# Patient Record
Sex: FEMALE | Race: WHITE | Employment: STUDENT | URBAN - METROPOLITAN AREA
[De-identification: names, ages, dates, MRNs, and addresses within clinical notes are randomized per-mention and may not be internally consistent; named-entity substitution may affect disease eponyms.]

---

## 2017-01-20 ENCOUNTER — HOSPITAL ENCOUNTER (EMERGENCY)
Facility: HOSPITAL | Age: 22
Discharge: HOME | End: 2017-01-20
Attending: EMERGENCY MEDICINE
Payer: COMMERCIAL

## 2017-01-20 VITALS
RESPIRATION RATE: 16 BRPM | HEART RATE: 108 BPM | TEMPERATURE: 98 F | OXYGEN SATURATION: 98 % | SYSTOLIC BLOOD PRESSURE: 120 MMHG | WEIGHT: 198.63 LBS | DIASTOLIC BLOOD PRESSURE: 76 MMHG

## 2017-01-20 DIAGNOSIS — R06.2 WHEEZING: Primary | ICD-10-CM

## 2017-01-20 PROCEDURE — 99284 EMERGENCY DEPT VISIT MOD MDM: CPT

## 2017-01-20 PROCEDURE — 94640 AIRWAY INHALATION TREATMENT: CPT

## 2017-01-20 RX ORDER — ALBUTEROL SULFATE 2.5 MG/3ML
SOLUTION RESPIRATORY (INHALATION) EVERY 6 HOURS PRN
COMMUNITY

## 2017-01-20 RX ORDER — EPINEPHRINE 0.3 MG/.3ML
0.3 INJECTION SUBCUTANEOUS
Qty: 1 EACH | Refills: 0 | Status: SHIPPED | OUTPATIENT
Start: 2017-01-20 | End: 2017-02-19

## 2017-01-20 RX ORDER — ALBUTEROL SULFATE 90 UG/1
2 AEROSOL, METERED RESPIRATORY (INHALATION) EVERY 4 HOURS PRN
Qty: 1 INHALER | Refills: 0 | Status: SHIPPED | OUTPATIENT
Start: 2017-01-20 | End: 2017-02-19

## 2017-01-20 RX ORDER — DIPHENHYDRAMINE HCL 25 MG
25 CAPSULE ORAL ONCE
Status: COMPLETED | OUTPATIENT
Start: 2017-01-20 | End: 2017-01-20

## 2017-01-20 RX ORDER — IPRATROPIUM BROMIDE AND ALBUTEROL SULFATE 2.5; .5 MG/3ML; MG/3ML
3 SOLUTION RESPIRATORY (INHALATION)
Status: COMPLETED | OUTPATIENT
Start: 2017-01-20 | End: 2017-01-20

## 2017-01-20 RX ORDER — PREDNISONE 20 MG/1
40 TABLET ORAL DAILY
Qty: 10 TABLET | Refills: 0 | Status: SHIPPED | OUTPATIENT
Start: 2017-01-20 | End: 2017-01-25

## 2017-01-20 RX ORDER — PREDNISONE 20 MG/1
60 TABLET ORAL ONCE
Status: COMPLETED | OUTPATIENT
Start: 2017-01-20 | End: 2017-01-20

## 2017-01-20 NOTE — RESPIRATORY THERAPY NOTE
BS CLEAR. SLIGHTLY DIMINISHED BUT PT WILL NOT PARTICIPATE IN TAKING DEEP BREATHS.  DID X3 DUONEB TREATMENTS WITH NO CHANGE IN RESPIRATORY STATUS (NO DISTRESS) OR BS.

## 2017-01-20 NOTE — ED INITIAL ASSESSMENT (HPI)
Hives noted to skin that have resolved prior to arrival / pt reports chest tightness and difficulty in breathing

## 2017-01-20 NOTE — ED PROVIDER NOTES
Patient Seen in: [facility] Emergency Department    History   Patient presents with:  Dyspnea JOEY SOB (respiratory)    Stated Complaint: difficulty in breathing    HPI    The patient is a 30-year-old female with a history of allergies to [facility] Pulse 114  Temp(Src) 98.4 °F (36.9 °C) (Temporal)  Resp 18  Wt 90.1 kg  SpO2 99%        Physical Exam   Constitutional: She is oriented to person, place, and time. She appears well-developed. No obvious hives noted   HENT:   Head: Normocephalic.    Eyes: needed.   Qty: 1 each Refills: 0

## 2017-07-09 ENCOUNTER — HOSPITAL ENCOUNTER (EMERGENCY)
Facility: HOSPITAL | Age: 22
Discharge: HOME | End: 2017-07-09
Attending: EMERGENCY MEDICINE
Payer: COMMERCIAL

## 2017-07-09 ENCOUNTER — APPOINTMENT (OUTPATIENT)
Dept: CT IMAGING | Facility: HOSPITAL | Age: 22
End: 2017-07-09
Attending: EMERGENCY MEDICINE
Payer: COMMERCIAL

## 2017-07-09 ENCOUNTER — APPOINTMENT (OUTPATIENT)
Dept: MRI IMAGING | Facility: HOSPITAL | Age: 22
End: 2017-07-09
Attending: EMERGENCY MEDICINE
Payer: COMMERCIAL

## 2017-07-09 VITALS
TEMPERATURE: 99 F | BODY MASS INDEX: 36.86 KG/M2 | HEIGHT: 63 IN | WEIGHT: 208 LBS | RESPIRATION RATE: 16 BRPM | SYSTOLIC BLOOD PRESSURE: 100 MMHG | OXYGEN SATURATION: 98 % | DIASTOLIC BLOOD PRESSURE: 57 MMHG | HEART RATE: 73 BPM

## 2017-07-09 DIAGNOSIS — H53.9 VISUAL DISTURBANCE: Primary | ICD-10-CM

## 2017-07-09 LAB
ALBUMIN SERPL-MCNC: 3.5 G/DL (ref 3.5–4.8)
ALP LIVER SERPL-CCNC: 70 U/L (ref 52–144)
ALT SERPL-CCNC: 37 U/L (ref 14–54)
AST SERPL-CCNC: 30 U/L (ref 15–41)
BASOPHILS # BLD AUTO: 0.08 X10(3) UL (ref 0–0.1)
BASOPHILS NFR BLD AUTO: 0.6 %
BILIRUB SERPL-MCNC: 0.5 MG/DL (ref 0.1–2)
BILIRUB UR QL STRIP.AUTO: NEGATIVE
BUN BLD-MCNC: 10 MG/DL (ref 8–20)
CALCIUM BLD-MCNC: 9.1 MG/DL (ref 8.3–10.3)
CHLORIDE: 108 MMOL/L (ref 101–111)
CLARITY UR REFRACT.AUTO: CLEAR
CO2: 24 MMOL/L (ref 22–32)
CREAT BLD-MCNC: 0.91 MG/DL (ref 0.55–1.02)
EOSINOPHIL # BLD AUTO: 0.03 X10(3) UL (ref 0–0.3)
EOSINOPHIL NFR BLD AUTO: 0.2 %
ERYTHROCYTE [DISTWIDTH] IN BLOOD BY AUTOMATED COUNT: 13.7 % (ref 11.5–16)
GLUCOSE BLD-MCNC: 102 MG/DL (ref 70–99)
GLUCOSE UR STRIP.AUTO-MCNC: NEGATIVE MG/DL
HCT VFR BLD AUTO: 40.5 % (ref 34–50)
HGB BLD-MCNC: 13.4 G/DL (ref 12–16)
IMMATURE GRANULOCYTE COUNT: 0.04 X10(3) UL (ref 0–1)
IMMATURE GRANULOCYTE RATIO %: 0.3 %
KETONES UR STRIP.AUTO-MCNC: NEGATIVE MG/DL
LYMPHOCYTES # BLD AUTO: 3.47 X10(3) UL (ref 0.9–4)
LYMPHOCYTES NFR BLD AUTO: 26.2 %
M PROTEIN MFR SERPL ELPH: 7.3 G/DL (ref 6.1–8.3)
MCH RBC QN AUTO: 26.6 PG (ref 27–33.2)
MCHC RBC AUTO-ENTMCNC: 33.1 G/DL (ref 31–37)
MCV RBC AUTO: 80.4 FL (ref 81–100)
MONOCYTES # BLD AUTO: 0.55 X10(3) UL (ref 0.1–0.6)
MONOCYTES NFR BLD AUTO: 4.2 %
NEUTROPHIL ABS PRELIM: 9.06 X10 (3) UL (ref 1.3–6.7)
NEUTROPHILS # BLD AUTO: 9.06 X10(3) UL (ref 1.3–6.7)
NEUTROPHILS NFR BLD AUTO: 68.5 %
NITRITE UR QL STRIP.AUTO: NEGATIVE
PH UR STRIP.AUTO: 7 [PH] (ref 4.5–8)
PLATELET # BLD AUTO: 333 10(3)UL (ref 150–450)
POCT LOT NUMBER: NORMAL
POCT URINE PREGNANCY: NEGATIVE
POTASSIUM SERPL-SCNC: 4.5 MMOL/L (ref 3.6–5.1)
PROT UR STRIP.AUTO-MCNC: NEGATIVE MG/DL
RBC # BLD AUTO: 5.04 X10(6)UL (ref 3.8–5.1)
RBC UR QL AUTO: NEGATIVE
RED CELL DISTRIBUTION WIDTH-SD: 39.6 FL (ref 35.1–46.3)
SODIUM SERPL-SCNC: 140 MMOL/L (ref 136–144)
SP GR UR STRIP.AUTO: <1.005 (ref 1–1.03)
UROBILINOGEN UR STRIP.AUTO-MCNC: <2 MG/DL
WBC # BLD AUTO: 13.2 X10(3) UL (ref 4–13)

## 2017-07-09 PROCEDURE — 70553 MRI BRAIN STEM W/O & W/DYE: CPT | Performed by: EMERGENCY MEDICINE

## 2017-07-09 PROCEDURE — A9575 INJ GADOTERATE MEGLUMI 0.1ML: HCPCS | Performed by: EMERGENCY MEDICINE

## 2017-07-09 PROCEDURE — 85025 COMPLETE CBC W/AUTO DIFF WBC: CPT | Performed by: EMERGENCY MEDICINE

## 2017-07-09 PROCEDURE — 99284 EMERGENCY DEPT VISIT MOD MDM: CPT

## 2017-07-09 PROCEDURE — 87086 URINE CULTURE/COLONY COUNT: CPT | Performed by: EMERGENCY MEDICINE

## 2017-07-09 PROCEDURE — 81001 URINALYSIS AUTO W/SCOPE: CPT | Performed by: EMERGENCY MEDICINE

## 2017-07-09 PROCEDURE — 36415 COLL VENOUS BLD VENIPUNCTURE: CPT

## 2017-07-09 PROCEDURE — 99285 EMERGENCY DEPT VISIT HI MDM: CPT

## 2017-07-09 PROCEDURE — 81025 URINE PREGNANCY TEST: CPT

## 2017-07-09 PROCEDURE — 87147 CULTURE TYPE IMMUNOLOGIC: CPT | Performed by: EMERGENCY MEDICINE

## 2017-07-09 PROCEDURE — 80053 COMPREHEN METABOLIC PANEL: CPT | Performed by: EMERGENCY MEDICINE

## 2017-07-09 PROCEDURE — 70450 CT HEAD/BRAIN W/O DYE: CPT | Performed by: EMERGENCY MEDICINE

## 2017-07-10 NOTE — ED NOTES
Awakens easily; interm ha when exposed to light outside her room. Denies nausea. Abarca without difficulty.

## 2017-07-10 NOTE — ED PROVIDER NOTES
Patient Seen in: [facility] Emergency Department    History   Patient presents with:   Allergic Rxn Allergies (immune)    Stated Complaint: post procedure complications pt gave plasma now has multiple symptoms    HPI    19-year-old female presents em 21 [07/09/17 1943]  Temp: 99.4 °F (37.4 °C) [07/09/17 1943]  Temp src: Temporal [07/09/17 1943]  SpO2: 99 % [07/09/17 1943]  O2 Device: None (Room air) [07/09/17 2107]    Current:/57   Pulse 73   Temp 99.4 °F (37.4 °C) (Temporal)   Resp 16   Ht 160 c W/ DIFFERENTIAL - Abnormal; Notable for the following:     WBC 13.2 (*)     MCV 80.4 (*)     MCH 26.6 (*)     Neutrophil Absolute Prelim 9.06 (*)     Neutrophil Absolute 9.06 (*)     All other components within normal limits   CBC WITH DIFFERENTIAL WITH PL oval foci of decreased CT attenuation within the anterior right and left nura. These are also seen on the sagittal and coronal sequences. The differential diagnosis would include artifact, areas of remote ischemia with acute ischemia not entirely excluded. acute infarction. There is no evidence of hemorrhage or mass lesion. There is normal enhancement of the brain, vessels and meninges. The expected intracranial flow voids are noted involving the carotid and left vertebral artery.  The right vertebral artery

## 2017-07-10 NOTE — ED NOTES
Pt reports she has donated plasma 3 times in past two weeks - at same location. [facility] @ [address], [place]. Reports she never had this reaction before.

## 2017-07-10 NOTE — ED INITIAL ASSESSMENT (HPI)
Pt to er after giving plasma at 1600 today. States she still feels nausea,blurred vision/interm,weakness and yakelin. Speaking in clear full sentences.

## 2017-07-10 NOTE — ED NOTES
Pt updated on poc - remains resting comfortably and reports she has almost \"full use of her right arm\".

## 2017-07-10 NOTE — ED NOTES
Pt reports she will take out all 18 piercings- cup received and given to adult friend at . Pt reports the plasma center had multi iv issues at both L and R ac iv access.  States plasma ctr had blood taken out with their machine without difficulty, but was

## 2017-07-11 RX ORDER — AMOXICILLIN 875 MG/1
875 TABLET, COATED ORAL 2 TIMES DAILY
Qty: 20 TABLET | Refills: 0 | Status: SHIPPED | OUTPATIENT
Start: 2017-07-11 | End: 2017-07-21

## 2017-11-16 ENCOUNTER — CHARTING TRANS (OUTPATIENT)
Dept: INTERNAL MEDICINE | Age: 22
End: 2017-11-16

## 2017-11-16 ENCOUNTER — LAB SERVICES (OUTPATIENT)
Dept: OTHER | Age: 22
End: 2017-11-16

## 2017-11-16 LAB
ALBUMIN SERPL BCG-MCNC: 4.5 G/DL (ref 3.6–5.1)
ALP SERPL-CCNC: 84 U/L (ref 45–105)
ALT SERPL W/O P-5'-P-CCNC: 23 U/L (ref 7–34)
AST SERPL-CCNC: 19 U/L (ref 9–37)
BILIRUB SERPL-MCNC: 0.2 MG/DL (ref 0–1)
BUN SERPL-MCNC: 8 MG/DL (ref 7–20)
CALCIUM SERPL-MCNC: 10.2 MG/DL (ref 8.6–10.6)
CHLORIDE SERPL-SCNC: 107 MMOL/L (ref 96–107)
CREATININE, SERUM: 0.9 MG/DL (ref 0.5–1.4)
DIFFERENTIAL TYPE: ABNORMAL
GFR SERPL CREATININE-BSD FRML MDRD: >60 ML/MIN/{1.73M2}
GFR SERPL CREATININE-BSD FRML MDRD: >60 ML/MIN/{1.73M2}
GLUCOSE SERPL-MCNC: 108 MG/DL (ref 70–200)
HCO3 SERPL-SCNC: 24 MMOL/L (ref 22–32)
HEMATOCRIT: 40.2 % (ref 34–45)
HEMOGLOBIN: 13.3 G/DL (ref 11.2–15.7)
LYMPH PERCENT: 39.4 % (ref 20.5–51.1)
LYMPHOCYTE ABSOLUTE #: 3.4 10*3/UL (ref 1.2–3.4)
MEAN CORPUSCULAR HGB CONCENTRATION: 33.1 % (ref 32–36)
MEAN CORPUSCULAR HGB: 26 PG (ref 27–34)
MEAN CORPUSCULAR VOLUME: 78.7 FL (ref 79–95)
MEAN PLATELET VOLUME: 9.1 FL (ref 8.6–12.4)
MIXED %: 7.8 % (ref 4.3–12.9)
MIXED ABSOLUTE #: 0.7 10*3/UL (ref 0.2–0.9)
NEUTROPHIL ABSOLUTE #: 4.6 10*3/UL (ref 1.4–6.5)
NEUTROPHIL PERCENT: 52.8 % (ref 34–73.5)
PLATELET COUNT: 399 10*3/UL (ref 150–400)
POTASSIUM SERPL-SCNC: 4.2 MMOL/L (ref 3.5–5.3)
PROT SERPL-MCNC: 7.3 G/DL (ref 6.2–8.1)
RED BLOOD CELL COUNT: 5.11 10*6/UL (ref 3.7–5.2)
RED CELL DISTRIBUTION WIDTH: 15.7 % (ref 11.3–14.8)
SEDIMENTATION RATE, RBC: 22 MM/H (ref 0–20)
SODIUM SERPL-SCNC: 140 MMOL/L (ref 136–146)
TSH SERPL DL<=0.05 MIU/L-ACNC: 0.73 M[IU]/L (ref 0.3–4.82)
WHITE BLOOD CELL COUNT: 8.7 10*3/UL (ref 4–10)

## 2018-11-27 VITALS
DIASTOLIC BLOOD PRESSURE: 68 MMHG | WEIGHT: 214 LBS | TEMPERATURE: 97.2 F | HEART RATE: 88 BPM | SYSTOLIC BLOOD PRESSURE: 118 MMHG | BODY MASS INDEX: 39.38 KG/M2 | HEIGHT: 62 IN

## 2019-08-16 ENCOUNTER — COMMUNICATION - HEALTHEAST (OUTPATIENT)
Dept: BEHAVIORAL HEALTH | Facility: CLINIC | Age: 24
End: 2019-08-16

## 2019-08-22 ENCOUNTER — OFFICE VISIT - HEALTHEAST (OUTPATIENT)
Dept: INTERNAL MEDICINE | Facility: CLINIC | Age: 24
End: 2019-08-22

## 2019-08-22 DIAGNOSIS — J45.41 MODERATE PERSISTENT ASTHMA WITH EXACERBATION: ICD-10-CM

## 2019-08-22 DIAGNOSIS — J38.3 VOCAL CORD DYSFUNCTION: ICD-10-CM

## 2019-08-22 DIAGNOSIS — F33.41 RECURRENT MAJOR DEPRESSIVE DISORDER, IN PARTIAL REMISSION (H): ICD-10-CM

## 2019-08-22 DIAGNOSIS — Z00.00 HEALTH MAINTENANCE EXAMINATION: ICD-10-CM

## 2019-08-22 DIAGNOSIS — J30.9 ALLERGIC RHINITIS, UNSPECIFIED SEASONALITY, UNSPECIFIED TRIGGER: ICD-10-CM

## 2019-08-22 ASSESSMENT — MIFFLIN-ST. JEOR: SCORE: 1719.77

## 2019-09-09 ENCOUNTER — HOSPITAL ENCOUNTER (EMERGENCY)
Facility: CLINIC | Age: 24
Discharge: HOME | End: 2019-09-09
Attending: FAMILY MEDICINE | Admitting: FAMILY MEDICINE
Payer: COMMERCIAL

## 2019-09-09 VITALS
DIASTOLIC BLOOD PRESSURE: 69 MMHG | SYSTOLIC BLOOD PRESSURE: 125 MMHG | HEART RATE: 97 BPM | TEMPERATURE: 98.4 F | OXYGEN SATURATION: 97 %

## 2019-09-09 DIAGNOSIS — F41.0 PANIC ATTACK: ICD-10-CM

## 2019-09-09 PROCEDURE — 25000128 H RX IP 250 OP 636: Performed by: STUDENT IN AN ORGANIZED HEALTH CARE EDUCATION/TRAINING PROGRAM

## 2019-09-09 PROCEDURE — 99284 EMERGENCY DEPT VISIT MOD MDM: CPT | Mod: Z6 | Performed by: FAMILY MEDICINE

## 2019-09-09 PROCEDURE — 96374 THER/PROPH/DIAG INJ IV PUSH: CPT | Performed by: FAMILY MEDICINE

## 2019-09-09 PROCEDURE — 99285 EMERGENCY DEPT VISIT HI MDM: CPT | Performed by: FAMILY MEDICINE

## 2019-09-09 RX ORDER — TRAZODONE HYDROCHLORIDE 50 MG/1
50 TABLET, FILM COATED ORAL
COMMUNITY
End: 2022-11-29

## 2019-09-09 RX ORDER — PRAZOSIN HYDROCHLORIDE 1 MG/1
3 CAPSULE ORAL AT BEDTIME
COMMUNITY
End: 2022-11-29

## 2019-09-09 RX ORDER — SERTRALINE HYDROCHLORIDE 100 MG/1
150 TABLET, FILM COATED ORAL DAILY
COMMUNITY
End: 2021-10-29

## 2019-09-09 RX ORDER — BUDESONIDE AND FORMOTEROL FUMARATE DIHYDRATE 80; 4.5 UG/1; UG/1
2 AEROSOL RESPIRATORY (INHALATION) 2 TIMES DAILY
COMMUNITY
End: 2021-09-14

## 2019-09-09 RX ORDER — LANOLIN ALCOHOL/MO/W.PET/CERES
3 CREAM (GRAM) TOPICAL
COMMUNITY
End: 2021-09-14

## 2019-09-09 RX ORDER — PRAZOSIN HYDROCHLORIDE 2 MG/1
2 CAPSULE ORAL AT BEDTIME
COMMUNITY
End: 2021-09-14

## 2019-09-09 RX ORDER — OMEGA-3 FATTY ACIDS/FISH OIL 300-1000MG
400 CAPSULE ORAL EVERY 4 HOURS PRN
COMMUNITY

## 2019-09-09 RX ORDER — ALBUTEROL SULFATE 90 UG/1
2 AEROSOL, METERED RESPIRATORY (INHALATION) EVERY 6 HOURS PRN
COMMUNITY
End: 2021-09-14

## 2019-09-09 RX ORDER — HYDROXYZINE HYDROCHLORIDE 50 MG/1
50 TABLET, FILM COATED ORAL EVERY 4 HOURS PRN
COMMUNITY
End: 2023-01-30

## 2019-09-09 RX ORDER — LORAZEPAM 2 MG/ML
0.5 INJECTION INTRAMUSCULAR ONCE
Status: DISCONTINUED | OUTPATIENT
Start: 2019-09-09 | End: 2019-09-09

## 2019-09-09 RX ORDER — FLUTICASONE PROPIONATE 50 MCG
1 SPRAY, SUSPENSION (ML) NASAL DAILY
COMMUNITY
End: 2022-12-30

## 2019-09-09 RX ORDER — LORAZEPAM 0.5 MG/1
0.5 TABLET ORAL ONCE
Status: DISCONTINUED | OUTPATIENT
Start: 2019-09-09 | End: 2019-09-09

## 2019-09-09 RX ORDER — AMOXICILLIN 250 MG
1 CAPSULE ORAL DAILY
COMMUNITY
End: 2021-09-14

## 2019-09-09 RX ORDER — CETIRIZINE HYDROCHLORIDE 10 MG/1
10 TABLET ORAL DAILY PRN
COMMUNITY

## 2019-09-09 RX ORDER — LORAZEPAM 2 MG/ML
0.5 INJECTION INTRAMUSCULAR ONCE
Status: COMPLETED | OUTPATIENT
Start: 2019-09-09 | End: 2019-09-09

## 2019-09-09 RX ORDER — ALBUTEROL SULFATE 0.83 MG/ML
2.5 SOLUTION RESPIRATORY (INHALATION) EVERY 6 HOURS PRN
COMMUNITY
End: 2021-09-14

## 2019-09-09 RX ORDER — POLYETHYLENE GLYCOL 3350 17 G/17G
1 POWDER, FOR SOLUTION ORAL DAILY
COMMUNITY
End: 2021-09-14

## 2019-09-09 RX ADMIN — LORAZEPAM 0.5 MG: 2 INJECTION INTRAMUSCULAR; INTRAVENOUS at 18:03

## 2019-09-09 ASSESSMENT — ENCOUNTER SYMPTOMS
ABDOMINAL PAIN: 0
HEADACHES: 0
LIGHT-HEADEDNESS: 0
FREQUENCY: 0
COUGH: 0
WHEEZING: 0
SHORTNESS OF BREATH: 0
NECK PAIN: 0
CHILLS: 0
AGITATION: 0
BACK PAIN: 0
COLOR CHANGE: 0
EYE PAIN: 0
FEVER: 0
ABDOMINAL DISTENTION: 0
DYSURIA: 0
NERVOUS/ANXIOUS: 1
FATIGUE: 0
CHEST TIGHTNESS: 0
CONFUSION: 0
PHOTOPHOBIA: 0
PALPITATIONS: 0

## 2019-09-09 NOTE — ED AVS SNAPSHOT
[facility], [place], Emergency Department  [address] [place] [id number]  Phone:  [phone]                                    Fabian Springer   MRN: [id number]    Department:  [facility], Emergency Department   Date of Visit:  9/9/2019           After Visit Summary Signature Page    I have received my discharge instructions, and my questions have been answered. I have discussed any challenges I see with this plan with the nurse or doctor.    ..........................................................................................................................................  Patient/Patient Representative Signature      ..........................................................................................................................................  Patient Representative Print Name and Relationship to Patient    ..................................................               ................................................  Date                                   Time    ..........................................................................................................................................  Reviewed by Signature/Title    ...................................................              ..............................................  Date                                               Time          22EPIC Rev 08/18

## 2019-09-09 NOTE — ED NOTES
Bed: HWD  Expected date: 9/9/19  Expected time:   Means of arrival: Ambulance  Comments:  STGARY 20  24 y F  MELY  Yellow

## 2019-09-10 NOTE — DISCHARGE INSTRUCTIONS
Please continue with your therapy sessions at [facility]. If you have any thoughts of self harm, please come to the ED immediately. If your shortness of breath worsens or you develop worsening chest pain, please come to the ED.
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
## 2019-09-13 ENCOUNTER — RECORDS - HEALTHEAST (OUTPATIENT)
Dept: GENERAL RADIOLOGY | Facility: CLINIC | Age: 24
End: 2019-09-13

## 2019-09-13 ENCOUNTER — OFFICE VISIT - HEALTHEAST (OUTPATIENT)
Dept: INTERNAL MEDICINE | Facility: CLINIC | Age: 24
End: 2019-09-13

## 2019-09-13 DIAGNOSIS — M25.532 PAIN IN LEFT WRIST: ICD-10-CM

## 2019-09-13 DIAGNOSIS — I80.9 THROMBOPHLEBITIS: ICD-10-CM

## 2019-09-13 DIAGNOSIS — M25.532 LEFT WRIST PAIN: ICD-10-CM

## 2019-09-13 ASSESSMENT — MIFFLIN-ST. JEOR: SCORE: 1701.63

## 2019-11-21 ENCOUNTER — RECORDS - HEALTHEAST (OUTPATIENT)
Dept: ADMINISTRATIVE | Facility: OTHER | Age: 24
End: 2019-11-21

## 2019-11-25 ENCOUNTER — OFFICE VISIT - HEALTHEAST (OUTPATIENT)
Dept: INTERNAL MEDICINE | Facility: CLINIC | Age: 24
End: 2019-11-25

## 2019-11-25 DIAGNOSIS — F33.2 MAJOR DEPRESSIVE DISORDER, RECURRENT, SEVERE WITHOUT PSYCHOTIC BEHAVIOR (H): ICD-10-CM

## 2019-11-25 LAB — HCG UR QL: NEGATIVE

## 2019-11-25 ASSESSMENT — MIFFLIN-ST. JEOR: SCORE: 1683.48

## 2019-11-26 ENCOUNTER — COMMUNICATION - HEALTHEAST (OUTPATIENT)
Dept: INTERNAL MEDICINE | Facility: CLINIC | Age: 24
End: 2019-11-26

## 2019-12-10 ENCOUNTER — RECORDS - HEALTHEAST (OUTPATIENT)
Dept: ADMINISTRATIVE | Facility: OTHER | Age: 24
End: 2019-12-10

## 2020-04-04 ENCOUNTER — RECORDS - HEALTHEAST (OUTPATIENT)
Dept: ADMINISTRATIVE | Facility: OTHER | Age: 25
End: 2020-04-04

## 2020-05-14 ENCOUNTER — OFFICE VISIT - HEALTHEAST (OUTPATIENT)
Dept: FAMILY MEDICINE | Facility: CLINIC | Age: 25
End: 2020-05-14

## 2020-05-14 ENCOUNTER — COMMUNICATION - HEALTHEAST (OUTPATIENT)
Dept: SCHEDULING | Facility: CLINIC | Age: 25
End: 2020-05-14

## 2020-05-14 ENCOUNTER — VIRTUAL VISIT (OUTPATIENT)
Dept: FAMILY MEDICINE | Facility: OTHER | Age: 25
End: 2020-05-14

## 2020-05-14 ENCOUNTER — AMBULATORY - HEALTHEAST (OUTPATIENT)
Dept: FAMILY MEDICINE | Facility: CLINIC | Age: 25
End: 2020-05-14

## 2020-05-14 DIAGNOSIS — Z20.822 SUSPECTED 2019 NOVEL CORONAVIRUS INFECTION: ICD-10-CM

## 2020-05-14 NOTE — PROGRESS NOTES
"Date: 2020 12:38:09  Clinician: Dwain Eldridge  Clinician NPI: [id number]  Patient: Fabian Springer  Patient : 1995  [place] [address] [place] [address]  Patient Phone: [phone]  Visit Protocol: URI  Patient Summary:  Fabian is a 24 year old ( : 1995 ) female who initiated a Visit for COVID-19 (Coronavirus) evaluation and screening. When asked the question \"Please sign me up to receive news, health information and promotions from [employer].\", Fabian responded \"No\".    Fabian states her symptoms started 1-2 days ago.   Her symptoms consist of a cough, nausea, chills, ear pain, a headache, malaise, a sore throat, and enlarged lymph nodes. She is experiencing mild difficulty breathing with activities but can speak normally in full sentences.   Symptom details     Cough: Fabian coughs every 5-10 minutes and her cough is more bothersome at night. Phlegm does not come into her throat when she coughs. She does not believe her cough is caused by post-nasal drip.     Sore throat: Fabian reports having moderate throat pain (4-6 on a 10 point pain scale), has exudate on her tonsils, and can swallow liquids. The lymph nodes in her neck are enlarged. A rash has not appeared on the skin since the sore throat started.     Headache: She states the headache is moderate (4-6 on a 10 point pain scale).      Fabian denies having nasal congestion, vomiting, teeth pain, ageusia, diarrhea, facial pain or pressure, myalgias, rhinitis, anosmia, wheezing, and fever. She also denies taking antibiotic medication for the symptoms and having recent facial or sinus surgery in the past 60 days.   Precipitating events  Within the past week, Fabian has not been exposed to someone with strep throat. She has not recently been exposed to someone with influenza. Fabian has been in close contact with the following high risk individuals: people with asthma, heart disease or diabetes, immunocompromised people, and " adults 65 or older.   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   She either works or volunteers as a healthcare worker or a , or works or volunteers in a healthcare facility. 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 She does not live with a healthcare worker.   Fabian has not had a close contact with a laboratory-confirmed COVID-19 patient within 14 days of symptom onset.   Pertinent medical history  Fabian does not get yeast infections when she takes antibiotics.   Fabian needs a return to work/school note.   Weight: 225 lbs   Fabian does not smoke or use smokeless tobacco.   She denies pregnancy and denies breastfeeding. She does not menstruate.   Weight: 225 lbs    MEDICATIONS: Symbicort inhalation, prazosin oral, trazodone oral, sertraline oral, ALLERGIES: NKDA  Clinician Response:  Dear Fabian,   Dear Fabian  Your symptoms show that you may have coronavirus (COVID-19). This illness can cause fever, cough and trouble breathing. Many people get a mild case and get better on their own. Some people can get very sick.  What should I do?  We would like to test you for this virus. This will be a curbside test done outside the clinic.  Please call [phone] to schedule your visit. Explain that you were referred by [facility] to have a COVID-19 test. Be ready to share your [facility] visit ID number.  Starting now:  Stay at least 6 feet away from others. (If someone will drive you to your test, stay in the backseat, as far away from the  as you can.)   Don't go to work, school or anywhere else. When it's time for your test, go straight to the testing site. Don't make any stops on the way there or back.   Wash your hands and face often. Use soap and  "water.   Cover your mouth and nose with a mask, tissue or washcloth.   Don't touch anyone. No hugging, kissing or handshakes.  While at home   Stay home and away from others (self-isolate) until:  You've had no fever---and no medicine that reduces fever---for 3 full days (72 hours). And...  Your other symptoms have gotten better. For example, your cough or breathing has improved. And...  At least 10 days have passed since your symptoms started.  During this time:  Stay in your own room (and use your own bathroom), if you can.  Don't go to work, school or anywhere else.  Stay away from others in your home. No hugging, kissing or shaking hands.  Don't let anyone visit.  Cover your mouth and nose with a mask, tissue or washcloth to avoid spreading germs.  Clean \"high touch\" surfaces often (doorknobs, counters, handles, etc.). Use a household cleaning spray or wipes.  Wash your hands and face often. Use soap and water.  How can I take care of myself?  1. Get lots of rest. Drink extra fluids (unless your doctor has told you not to).  2. Take Tylenol (acetaminophen) for fever or pain. If you have liver or kidney problems, ask your family doctor if it's okay to take Tylenol.  Adults can take either:   650 mg (two 325 mg pills) every 4 to 6 hours, or...  1,000 mg (two 500 mg pills) every 8 hours as needed.   Note: Don't take more than 3,000 mg in one day.   Acetaminophen is found in many medicines (both prescribed and over-the-counter medicines). Read all labels to be sure you don't take too much.   For children, check the Tylenol bottle for the right dose. The dose is based on the child's age or weight.  3. If you have other health problems (like cancer, heart failure, an organ transplant or severe kidney disease): Call your specialty clinic if you don't feel better in the next 2 days.  4. Know when to call 911: If your breathing is so bad that it keeps you from doing normal activities, call 911 or go to the emergency " room. Tell them that you've been staying home and may have COVID-19.  5. Sign up for [employer]. We know it's scary to hear that you might have COVID-19. We want to track your symptoms to make sure you're okay over the next 2 weeks. Please look for an email from [employer]---this is a free, online program that we'll use to keep in touch. To sign up, follow the link in the email. Learn more at [email or web address].  6. The following will serve as your written order for this Covid Test ordered by me for the indication of suspected Covid [Z20.828]: The test will be ordered in TMS, our electronic health record after you are scheduled and will show as ordered and authorized by Jose Almonte MD   Order: Covid-19 (Coronavirus) PCR for SYMPTOMATIC testing from [facility]  Where can I get more information?  To learn more about COVID-19 and how to care for yourself at home, please visit the CDC website at https://www.cdc.gov/coronavirus/2019-ncov/about/steps-when-sick.html.  For more about your care at [facility], please visit [email or web address]  If you'd like to be part of a COVID-19 clinical trial (research study) at the [facility], go to [email or web address] for details.    Diagnosis: Acute upper respiratory infection, unspecified  Diagnosis ICD: J06.9

## 2020-05-16 ENCOUNTER — COMMUNICATION - HEALTHEAST (OUTPATIENT)
Dept: FAMILY MEDICINE | Facility: CLINIC | Age: 25
End: 2020-05-16

## 2020-05-29 ENCOUNTER — VIRTUAL VISIT (OUTPATIENT)
Dept: FAMILY MEDICINE | Facility: OTHER | Age: 25
End: 2020-05-29

## 2020-05-29 ENCOUNTER — OFFICE VISIT - HEALTHEAST (OUTPATIENT)
Dept: FAMILY MEDICINE | Facility: CLINIC | Age: 25
End: 2020-05-29

## 2020-05-29 DIAGNOSIS — L03.213 PRESEPTAL CELLULITIS OF LEFT EYE: ICD-10-CM

## 2020-05-29 DIAGNOSIS — H00.015 HORDEOLUM EXTERNUM OF LEFT LOWER EYELID: ICD-10-CM

## 2020-05-29 NOTE — PROGRESS NOTES
"Date: 2020 09:04:55  Clinician: Yazmin Jacobo  Clinician NPI: [id number]  Patient: Fabian Springer  Patient : 1995  Patient [place] [address] [place] [address]  Patient Phone: [phone]  Visit Protocol: Eye conditions  Patient Summary:  Fabian is a 24 year old (: 1995 ) female who initiated a Visit for stye.  When asked the question \"Please sign me up to receive news, health information and promotions from [employer].\", Fabian responded \"No\".    Images of her eye condition were uploaded.   Her symptoms started 1-2 days ago and affect the left eye. The symptoms consist of itchy eye(s), eye pain, and drainage coming from the eye(s).   Symptom details     Drainage: The color of the drainage coming out of her eye(s) is clear. The drainage is watery but does not cause her eyelids to be stuck shut in the morning.    Itchiness: Fabian has seasonal allergies or hay fever.    Eye pain: She is experiencing mild eye pain (1-3 on a 10 point pain scale). She has not taken over-the-counter medication for the pain.     Denied symptoms include eyelid swelling, light sensitivity, bumps on the eyelid, and eye redness. Fabian does not have subconjunctival hemorrhage and has not experienced a decrease in vision. She does not feel feverish.   Precipitating events   She has not had a recent cold or ear infection, eye surgery, foreign body in the eye(s), and eye injury. She does not wear contact lenses.   Pertinent medical history  Fabian has not ever been diagnosed with glaucoma.   Fabian is not taking medication to treat her current symptoms.   Fabian does not require proof of evaluation of her eye condition before returning to school, work, or .   Fabian does not smoke or use smokeless tobacco.   She denies pregnancy and denies breastfeeding. She does not menstruate.   Additional information as reported by the patient (free text): The area under my eye is very swollen all the way across, and " very painful. Does not seem like a stye (I've had them before). This is worse and the swelling has gotten worse since last night.     MEDICATIONS: Symbicort inhalation, prazosin oral, trazodone oral, sertraline oral, ALLERGIES: NKDA  Clinician Response:  Dear Fabian,  I am sorry you are not feeling well. Your health is our priority. To determine the most appropriate care for you, I would like you to be seen in person to further discuss your health history and symptoms.  You will not be charged for this Visit. Thank you for trusting us with your care.   Diagnosis: Refer for additional evaluation  Diagnosis ICD: R69  Additional Clinician Notes: I am concerned with the degree of swelling - an in-person evaluation is needed to make sure the correct diagnosis and treatment plan is done.

## 2020-07-16 ENCOUNTER — RECORDS - HEALTHEAST (OUTPATIENT)
Dept: ADMINISTRATIVE | Facility: OTHER | Age: 25
End: 2020-07-16

## 2020-09-29 ENCOUNTER — COMMUNICATION - HEALTHEAST (OUTPATIENT)
Dept: INTERNAL MEDICINE | Facility: CLINIC | Age: 25
End: 2020-09-29

## 2020-09-29 ENCOUNTER — COMMUNICATION - HEALTHEAST (OUTPATIENT)
Dept: SCHEDULING | Facility: CLINIC | Age: 25
End: 2020-09-29

## 2020-11-24 ENCOUNTER — OFFICE VISIT - HEALTHEAST (OUTPATIENT)
Dept: FAMILY MEDICINE | Facility: CLINIC | Age: 25
End: 2020-11-24

## 2020-11-24 DIAGNOSIS — Z12.4 PAP SMEAR FOR CERVICAL CANCER SCREENING: ICD-10-CM

## 2020-11-24 DIAGNOSIS — F41.1 GENERALIZED ANXIETY DISORDER: ICD-10-CM

## 2020-11-24 DIAGNOSIS — Z00.00 ROUTINE GENERAL MEDICAL EXAMINATION AT A HEALTH CARE FACILITY: ICD-10-CM

## 2020-11-24 DIAGNOSIS — J45.41 MODERATE PERSISTENT ASTHMA WITH EXACERBATION: ICD-10-CM

## 2020-11-24 DIAGNOSIS — F33.2 MAJOR DEPRESSIVE DISORDER, RECURRENT, SEVERE WITHOUT PSYCHOTIC BEHAVIOR (H): ICD-10-CM

## 2020-11-24 DIAGNOSIS — Z30.432 ENCOUNTER FOR IUD REMOVAL: ICD-10-CM

## 2020-11-24 LAB
ANION GAP SERPL CALCULATED.3IONS-SCNC: 10 MMOL/L (ref 5–18)
BUN SERPL-MCNC: 8 MG/DL (ref 8–22)
CALCIUM SERPL-MCNC: 9.8 MG/DL (ref 8.5–10.5)
CHLORIDE BLD-SCNC: 107 MMOL/L (ref 98–107)
CHOLEST SERPL-MCNC: 173 MG/DL
CO2 SERPL-SCNC: 22 MMOL/L (ref 22–31)
CREAT SERPL-MCNC: 0.81 MG/DL (ref 0.6–1.1)
FASTING STATUS PATIENT QL REPORTED: YES
GFR SERPL CREATININE-BSD FRML MDRD: >60 ML/MIN/1.73M2
GLUCOSE BLD-MCNC: 90 MG/DL (ref 70–125)
HBA1C MFR BLD: 5.4 %
HDLC SERPL-MCNC: 51 MG/DL
HIV 1+2 AB+HIV1 P24 AG SERPL QL IA: NEGATIVE
LDLC SERPL CALC-MCNC: 106 MG/DL
POTASSIUM BLD-SCNC: 4.5 MMOL/L (ref 3.5–5)
SODIUM SERPL-SCNC: 139 MMOL/L (ref 136–145)
TRIGL SERPL-MCNC: 82 MG/DL

## 2020-11-24 ASSESSMENT — ANXIETY QUESTIONNAIRES
3. WORRYING TOO MUCH ABOUT DIFFERENT THINGS: MORE THAN HALF THE DAYS
GAD7 TOTAL SCORE: 14
5. BEING SO RESTLESS THAT IT IS HARD TO SIT STILL: MORE THAN HALF THE DAYS
2. NOT BEING ABLE TO STOP OR CONTROL WORRYING: MORE THAN HALF THE DAYS
7. FEELING AFRAID AS IF SOMETHING AWFUL MIGHT HAPPEN: MORE THAN HALF THE DAYS
6. BECOMING EASILY ANNOYED OR IRRITABLE: MORE THAN HALF THE DAYS
4. TROUBLE RELAXING: MORE THAN HALF THE DAYS
1. FEELING NERVOUS, ANXIOUS, OR ON EDGE: MORE THAN HALF THE DAYS
IF YOU CHECKED OFF ANY PROBLEMS ON THIS QUESTIONNAIRE, HOW DIFFICULT HAVE THESE PROBLEMS MADE IT FOR YOU TO DO YOUR WORK, TAKE CARE OF THINGS AT HOME, OR GET ALONG WITH OTHER PEOPLE: VERY DIFFICULT

## 2020-11-24 ASSESSMENT — MIFFLIN-ST. JEOR: SCORE: 1778.79

## 2020-11-24 ASSESSMENT — PATIENT HEALTH QUESTIONNAIRE - PHQ9: SUM OF ALL RESPONSES TO PHQ QUESTIONS 1-9: 10

## 2020-11-24 NOTE — ASSESSMENT & PLAN NOTE
Patient following with ENT. Symptoms not under adequate control. Patient reports she is using a steroid inhaler twice daily but she isn't sure which one and it isn't on her chart. Follow up with her ENT provider recommended.

## 2020-11-25 LAB — HCV AB SERPL QL IA: NEGATIVE

## 2020-11-27 LAB
BKR LAB AP ABNORMAL BLEEDING: NO
BKR LAB AP BIRTH CONTROL/HORMONES: NORMAL
BKR LAB AP CERVICAL APPEARANCE: NORMAL
BKR LAB AP GYN ADEQUACY: NORMAL
BKR LAB AP GYN INTERPRETATION: NORMAL
BKR LAB AP GYN OTHER FINDINGS: NORMAL
BKR LAB AP HPV REFLEX: NORMAL
BKR LAB AP LMP: NORMAL
BKR LAB AP PATIENT STATUS: NORMAL
BKR LAB AP PREVIOUS ABNORMAL: NORMAL
BKR LAB AP PREVIOUS NORMAL: NORMAL
C TRACH DNA SPEC QL PROBE+SIG AMP: NEGATIVE
HIGH RISK?: NO
N GONORRHOEA DNA SPEC QL NAA+PROBE: NEGATIVE
PATH REPORT.COMMENTS IMP SPEC: NORMAL
RESULT FLAG (HE HISTORICAL CONVERSION): NORMAL

## 2020-12-02 ENCOUNTER — COMMUNICATION - HEALTHEAST (OUTPATIENT)
Dept: FAMILY MEDICINE | Facility: CLINIC | Age: 25
End: 2020-12-02

## 2020-12-02 DIAGNOSIS — Z71.3 RESTRICTED DIET: ICD-10-CM

## 2020-12-04 ENCOUNTER — COMMUNICATION - HEALTHEAST (OUTPATIENT)
Dept: FAMILY MEDICINE | Facility: CLINIC | Age: 25
End: 2020-12-04

## 2020-12-04 ENCOUNTER — AMBULATORY - HEALTHEAST (OUTPATIENT)
Dept: FAMILY MEDICINE | Facility: CLINIC | Age: 25
End: 2020-12-04

## 2020-12-04 DIAGNOSIS — B37.31 YEAST VAGINITIS: ICD-10-CM

## 2020-12-04 DIAGNOSIS — N76.0 BACTERIAL VAGINITIS: ICD-10-CM

## 2020-12-04 DIAGNOSIS — B96.89 BACTERIAL VAGINITIS: ICD-10-CM

## 2021-01-28 ENCOUNTER — COMMUNICATION - HEALTHEAST (OUTPATIENT)
Dept: FAMILY MEDICINE | Facility: CLINIC | Age: 26
End: 2021-01-28

## 2021-02-08 ENCOUNTER — COMMUNICATION - HEALTHEAST (OUTPATIENT)
Dept: FAMILY MEDICINE | Facility: CLINIC | Age: 26
End: 2021-02-08

## 2021-02-09 ENCOUNTER — OFFICE VISIT - HEALTHEAST (OUTPATIENT)
Dept: FAMILY MEDICINE | Facility: CLINIC | Age: 26
End: 2021-02-09

## 2021-02-09 DIAGNOSIS — L21.0 DANDRUFF: ICD-10-CM

## 2021-02-09 DIAGNOSIS — J38.3 VOCAL CORD DYSFUNCTION: ICD-10-CM

## 2021-02-09 DIAGNOSIS — J45.41 MODERATE PERSISTENT ASTHMA WITH EXACERBATION: ICD-10-CM

## 2021-02-09 ASSESSMENT — ANXIETY QUESTIONNAIRES
4. TROUBLE RELAXING: SEVERAL DAYS
5. BEING SO RESTLESS THAT IT IS HARD TO SIT STILL: SEVERAL DAYS
6. BECOMING EASILY ANNOYED OR IRRITABLE: SEVERAL DAYS
2. NOT BEING ABLE TO STOP OR CONTROL WORRYING: SEVERAL DAYS
1. FEELING NERVOUS, ANXIOUS, OR ON EDGE: MORE THAN HALF THE DAYS
IF YOU CHECKED OFF ANY PROBLEMS ON THIS QUESTIONNAIRE, HOW DIFFICULT HAVE THESE PROBLEMS MADE IT FOR YOU TO DO YOUR WORK, TAKE CARE OF THINGS AT HOME, OR GET ALONG WITH OTHER PEOPLE: SOMEWHAT DIFFICULT
7. FEELING AFRAID AS IF SOMETHING AWFUL MIGHT HAPPEN: SEVERAL DAYS
3. WORRYING TOO MUCH ABOUT DIFFERENT THINGS: SEVERAL DAYS
GAD7 TOTAL SCORE: 8

## 2021-02-09 ASSESSMENT — MIFFLIN-ST. JEOR: SCORE: 1853.63

## 2021-02-09 ASSESSMENT — PATIENT HEALTH QUESTIONNAIRE - PHQ9: SUM OF ALL RESPONSES TO PHQ QUESTIONS 1-9: 8

## 2021-02-09 NOTE — ASSESSMENT & PLAN NOTE
No significant inflammation present suggestive of psoriasis or other inflammatory condition. Ketoconazole shampoo twice weekly for 2 weeks, then weekly for long term use.

## 2021-02-15 ENCOUNTER — OFFICE VISIT - HEALTHEAST (OUTPATIENT)
Dept: OTOLARYNGOLOGY | Facility: CLINIC | Age: 26
End: 2021-02-15

## 2021-02-15 DIAGNOSIS — J38.3 VOCAL CORD DYSFUNCTION: ICD-10-CM

## 2021-02-15 DIAGNOSIS — Z87.09 HISTORY OF ASTHMA: ICD-10-CM

## 2021-03-15 ENCOUNTER — OFFICE VISIT - HEALTHEAST (OUTPATIENT)
Dept: ALLERGY | Facility: CLINIC | Age: 26
End: 2021-03-15

## 2021-03-15 DIAGNOSIS — J30.89 ALLERGIC RHINITIS DUE TO DUST MITE: ICD-10-CM

## 2021-03-15 DIAGNOSIS — J45.30 MILD PERSISTENT ASTHMA WITHOUT COMPLICATION: ICD-10-CM

## 2021-03-15 DIAGNOSIS — J30.1 SEASONAL ALLERGIC RHINITIS DUE TO POLLEN: ICD-10-CM

## 2021-03-15 ASSESSMENT — MIFFLIN-ST. JEOR: SCORE: 1842.24

## 2021-03-16 ENCOUNTER — COMMUNICATION - HEALTHEAST (OUTPATIENT)
Dept: ALLERGY | Facility: CLINIC | Age: 26
End: 2021-03-16

## 2021-03-17 ENCOUNTER — HOSPITAL ENCOUNTER (OUTPATIENT)
Dept: RESPIRATORY THERAPY | Facility: CLINIC | Age: 26
Discharge: HOME | End: 2021-03-17
Attending: ALLERGY & IMMUNOLOGY

## 2021-03-17 DIAGNOSIS — J45.30 MILD PERSISTENT ASTHMA WITHOUT COMPLICATION: ICD-10-CM

## 2021-03-17 LAB — HGB BLD-MCNC: 13.6 G/DL (ref 12–16)

## 2021-03-18 ENCOUNTER — COMMUNICATION - HEALTHEAST (OUTPATIENT)
Dept: ALLERGY | Facility: CLINIC | Age: 26
End: 2021-03-18

## 2021-05-27 ASSESSMENT — PATIENT HEALTH QUESTIONNAIRE - PHQ9
SUM OF ALL RESPONSES TO PHQ QUESTIONS 1-9: 8
SUM OF ALL RESPONSES TO PHQ QUESTIONS 1-9: 10

## 2021-05-28 ASSESSMENT — ASTHMA QUESTIONNAIRES
ACT_TOTALSCORE: 21
ACT_TOTALSCORE: 17
ACT_TOTALSCORE: 18
ACT_TOTALSCORE: 17

## 2021-05-28 ASSESSMENT — ANXIETY QUESTIONNAIRES
GAD7 TOTAL SCORE: 14
GAD7 TOTAL SCORE: 8

## 2021-05-31 NOTE — PROGRESS NOTES
Office Visit - hospital follow up and establish care.    Fabian Springer   24 y.o. female    Date of Visit: 8/22/2019         Assessment and Plan   1. Moderate persistent asthma with exacerbation  She has had to use her albuterol inhaler several times . Will add symbicort .  - albuterol (PROVENTIL) 2.5 mg /3 mL (0.083 %) nebulizer solution; Take 3 mL (2.5 mg total) by nebulization every 4 (four) hours as needed for wheezing.  Dispense: 25 vial; Refill: 2  - budesonide-formoterol (SYMBICORT) 80-4.5 mcg/actuation inhaler; Inhale 2 puffs 2 (two) times a day.  Dispense: 1 Inhaler; Refill: 12    2. Vocal cord dysfunction  Used to see a pulmonologist.  .     3. Allergic rhinitis, unspecified seasonality, unspecified trigger  Will add flonase   - fluticasone propionate (FLONASE) 50 mcg/actuation nasal spray; 2 sprays into each nostril daily.  Dispense: 10 g; Refill: 11    4. Health maintenance examination  She reports having a pap smear 2018 , Will try and get records   Immunization reviewed . Is due for Hep A booster.       5. Recurrent major depressive disorder, in partial remission (H)  Estranged from parents, h/o alcoholism and depression with suicidal ideation . Born in [place] but was in [place] , moved to [place] to be closer to grandfather and nieces and nephews . Then was admitted to the hospital for suicidal ideation . She says she is back to normal . She feels safe in the residential home she is . She has a  . May stay with relatives when she is out . She has psychiatry appointment set up           Return in about 3 months (around 11/22/2019).     History of Present Illness   This 24 y.o. old   Chief Complaint   Patient presents with     Hospital Visit Follow Up     Feeling good, not fasting, hospital follow up    history of depression , alcoholism and suicidal ideation . Recently   Post Discharge Medication Reconciliation Status: discharge medications reconciled, continue medications without  "change      Review of Systems: A comprehensive review of systems was negative except as noted.     Medications, Allergies and Problem List   Reviewed, reconciled and updated  Patient Active Problem List   Diagnosis     Mood disorder (H)     Major depressive disorder, recurrent, severe without psychotic behavior (H)     Posttraumatic stress disorder     Alcohol abuse     Moderate persistent asthma with exacerbation     Vocal cord dysfunction        Physical Exam   General Appearance:       /60 (Patient Site: Right Arm, Patient Position: Sitting, Cuff Size: Adult Large)   Pulse 85   Ht 5' 2\" (1.575 m)   Wt (!) 223 lb (101.2 kg)   LMP  (LMP Unknown)   SpO2 98%   BMI 40.79 kg/m      General appearance - alert, well appearing, and in no distress  Mental status - alert, oriented to person, place, and time  Neck - supple, no significant adenopathy  Lymphatics - no palpable lymphadenopathy, no hepatosplenomegaly  Chest - clear to auscultation, no wheezes, rales or rhonchi, symmetric air entry  Heart - normal rate, regular rhythm, normal S1, S2, no murmurs, rubs, clicks or gallops  Abdomen - soft, nontender, nondistended, no masses or organomegaly                                                                                       Additional Information   Current Outpatient Medications   Medication Sig Dispense Refill     albuterol (PROAIR HFA;PROVENTIL HFA;VENTOLIN HFA) 90 mcg/actuation inhaler Inhale 2 puffs every 6 (six) hours as needed for wheezing (prn shoertness of breath.). 1 Inhaler 0     cetirizine (ZYRTEC) 10 MG tablet Take 1 tablet (10 mg total) by mouth daily as needed for allergies.  0     hydrOXYzine pamoate (VISTARIL) 50 MG capsule Take 1 capsule (50 mg total) by mouth every 4 (four) hours as needed for anxiety. 30 capsule 0     ibuprofen (ADVIL,MOTRIN) 200 MG tablet Take 600 mg by mouth every 8 (eight) hours as needed for pain or headaches.       magnesium hydroxide (MILK OF MAG) 400 mg/5 mL Susp " suspension Take 30 mL by mouth daily as needed.  0     melatonin 3 mg Tab tablet Take 1 tablet (3 mg total) by mouth at bedtime as needed (Sleep).  0     polyethylene glycol (MIRALAX) 17 gram packet Take 1 packet (17 g total) by mouth daily as needed.  0     prazosin (MINIPRESS) 2 MG capsule Take 1 capsule (2 mg total) by mouth at bedtime. 30 capsule 0     senna-docusate (PERICOLACE) 8.6-50 mg tablet Take 1 tablet by mouth 2 (two) times a day. 60 tablet 0     sertraline (ZOLOFT) 50 MG tablet Take 1 tablet (50 mg total) by mouth every morning. 30 tablet 0     traZODone (DESYREL) 50 MG tablet Take 1-2 tablets ( mg total) by mouth at bedtime as needed, may repeat once for sleep. 30 tablet 0     albuterol (PROVENTIL) 2.5 mg /3 mL (0.083 %) nebulizer solution Take 3 mL (2.5 mg total) by nebulization every 4 (four) hours as needed for wheezing. 25 vial 2     budesonide-formoterol (SYMBICORT) 80-4.5 mcg/actuation inhaler Inhale 2 puffs 2 (two) times a day. 1 Inhaler 12     fluticasone propionate (FLONASE) 50 mcg/actuation nasal spray 2 sprays into each nostril daily. 10 g 11     No current facility-administered medications for this visit.      No Known Allergies  Social History     Social History Narrative    Currently in residential house , might go to grandparents house      born in [facility] , living in [place] , moved to [place] the day she went ot [facility] . Has a .       reports that she quit smoking about 2 weeks ago. She has a 0.12 pack-year smoking history. She quit smokeless tobacco use about 2 weeks ago. She reports that she drinks about 64.2 oz of alcohol per week. She reports that she does not use drugs.   Past Medical History:   Diagnosis Date     Anxiety      Asthma      Depression      PTSD (post-traumatic stress disorder)            Time: total time spent with the patient was 25 minutes of which >50% was spent in counseling and coordination of care     Maddi Lance MD

## 2021-06-01 NOTE — PROGRESS NOTES
Office Visit - Follow Up   Fabian Springer   24 y.o. female    Date of Visit: 9/13/2019         Assessment and Plan   1. Left wrist pain  I do feel the swelling and acute tenderness that she has is due to thrombophlebitis of the superficial veins of the dorsum of her hand where the IV was put in and not due to a bony lesion or a tendinitis..  She is also tender though below and the over the the bone so x-ray was done.  I looked at it myself there was no visible fractures in the radius ulna or the wrist bones.  I did put on a wrist brace for her told her to ice it and take naproxen if she has persistent pain we will refer her to orthopedics.  She may have wrist tendinitis that preceded the thrombophlebitis.- XR Hand Left 3 or More VWS; Future  - XR Forearm Left; Future  - naproxen (NAPROSYN) 500 MG tablet; Take 1 tablet (500 mg total) by mouth 2 (two) times a day with meals.  Dispense: 60 tablet; Refill: 2    2. Thrombophlebitis  Conservative care, ice.            Return for Next scheduled follow up.     History of Present Illness   This 24 y.o. old   Chief Complaint   Patient presents with     Hand Pain     left hand pain just above wrist for about 1 month, pain moving toward the wrist at times     Concerns     Fell down stairs and stress fractur in the wrist years ago, about 2017   She also had an IV for a recent episode of asthma when she went to the ER in the left hand and it is swollen around that area    Review of Systems: A comprehensive review of systems was negative except as noted.     Medications, Allergies and Problem List   Reviewed, reconciled and updated  Patient Active Problem List   Diagnosis     Mood disorder (H)     Major depressive disorder, recurrent, severe without psychotic behavior (H)     Posttraumatic stress disorder     Alcohol abuse     Moderate persistent asthma with exacerbation     Vocal cord dysfunction        Physical Exam   General Appearance:       /64 (Patient Site: Left  "Arm)   Pulse 91   Ht 5' 2\" (1.575 m)   Wt 219 lb (99.3 kg)   SpO2 98%   BMI 40.06 kg/m      General appearance - alert, well appearing, and in no distress  Mental status - alert, oriented to person, place, and time  Chest - clear to auscultation, no wheezes, rales or rhonchi, symmetric air entry  Heart - normal rate, regular rhythm, normal S1, S2, no murmurs, rubs, clicks or gallops  Left arm visualized skin there is some swelling over the left dorsum of the hand and minimal swelling over the left wrist there is no overlying erythema or warmth she is acutely tender over the vein over the dorsum of the hand which is also prominent and slightly thickened tracking all the way up to the bottom third of her forearm there is no ascending thrombophlebitis into the upper arm the actual forearm is very supple.  There is some restriction in wrist movement up-and-down with due to pain and fist formation.  But she has good capillary return and normal monofilament testing of the hand.  And is able to actually have full range of motions there is tenderness over the radius and ulna the sides and underneath and on top over the vein.                                                                                     Additional Information   Current Outpatient Medications   Medication Sig Dispense Refill     albuterol (PROAIR HFA;PROVENTIL HFA;VENTOLIN HFA) 90 mcg/actuation inhaler Inhale 2 puffs every 6 (six) hours as needed for wheezing (prn shoertness of breath.). 1 Inhaler 0     albuterol (PROVENTIL) 2.5 mg /3 mL (0.083 %) nebulizer solution Take 3 mL (2.5 mg total) by nebulization every 4 (four) hours as needed for wheezing. 25 vial 2     budesonide-formoterol (SYMBICORT) 80-4.5 mcg/actuation inhaler Inhale 2 puffs 2 (two) times a day. 1 Inhaler 12     cetirizine (ZYRTEC) 10 MG tablet Take 1 tablet (10 mg total) by mouth daily as needed for allergies.  0     fluticasone propionate (FLONASE) 50 mcg/actuation nasal spray 2 " sprays into each nostril daily. 10 g 11     hydrOXYzine pamoate (VISTARIL) 50 MG capsule Take 1 capsule (50 mg total) by mouth every 4 (four) hours as needed for anxiety. 30 capsule 0     ibuprofen (ADVIL,MOTRIN) 200 MG tablet Take 600 mg by mouth every 8 (eight) hours as needed for pain or headaches.       melatonin 3 mg Tab tablet Take 1 tablet (3 mg total) by mouth at bedtime as needed (Sleep).  0     prazosin (MINIPRESS) 1 MG capsule Take 1 mg by mouth.       senna-docusate (PERICOLACE) 8.6-50 mg tablet Take 1 tablet by mouth 2 (two) times a day. 60 tablet 0     sertraline (ZOLOFT) 100 MG tablet Take 100 mg by mouth.       traZODone (DESYREL) 50 MG tablet Take 1-2 tablets ( mg total) by mouth at bedtime as needed, may repeat once for sleep. 30 tablet 0     hydrOXYzine HCl (ATARAX) 50 MG tablet Take 50 mg by mouth.       magnesium hydroxide (MILK OF MAG) 400 mg/5 mL Susp suspension Take 30 mL by mouth daily as needed.  0     naproxen (NAPROSYN) 500 MG tablet Take 1 tablet (500 mg total) by mouth 2 (two) times a day with meals. 60 tablet 2     polyethylene glycol (MIRALAX) 17 gram packet Take 1 packet (17 g total) by mouth daily as needed.  0     prazosin (MINIPRESS) 2 MG capsule Take 1 capsule (2 mg total) by mouth at bedtime. 30 capsule 0     sertraline (ZOLOFT) 50 MG tablet Take 1 tablet (50 mg total) by mouth every morning. 30 tablet 0     No current facility-administered medications for this visit.      No Known Allergies  Social History     Social History Narrative    Currently in residential house , might go to grandparents house      born in [facility] , living in [place] , moved to [place] the day she went ot [facility] . Has a .       reports that she quit smoking about 5 weeks ago. She has a 0.12 pack-year smoking history. She quit smokeless tobacco use about 5 weeks ago. She reports that she drinks about 64.2 oz of alcohol per week. She reports that she does not use drugs.   Past Medical  History:   Diagnosis Date     Anxiety      Asthma      Depression      PTSD (post-traumatic stress disorder)            Time: total time spent with the patient was 25 minutes of which >50% was spent in counseling and coordination of care     Maddi Lance MD

## 2021-06-03 VITALS
HEIGHT: 62 IN | WEIGHT: 215 LBS | SYSTOLIC BLOOD PRESSURE: 100 MMHG | BODY MASS INDEX: 39.56 KG/M2 | HEART RATE: 74 BPM | DIASTOLIC BLOOD PRESSURE: 62 MMHG | OXYGEN SATURATION: 97 %

## 2021-06-03 VITALS
HEIGHT: 62 IN | BODY MASS INDEX: 40.3 KG/M2 | OXYGEN SATURATION: 98 % | WEIGHT: 219 LBS | SYSTOLIC BLOOD PRESSURE: 100 MMHG | DIASTOLIC BLOOD PRESSURE: 64 MMHG | HEART RATE: 91 BPM

## 2021-06-03 VITALS — HEIGHT: 62 IN | WEIGHT: 223 LBS | BODY MASS INDEX: 41.04 KG/M2

## 2021-06-03 NOTE — TELEPHONE ENCOUNTER
Dr. Lance,  Please review lab results from yesterday.  Labs are finalized in the patient's chart.  Please advise.  Thank you.  Safia DAVIS, CMA/CMT....................10:33 AM

## 2021-06-03 NOTE — TELEPHONE ENCOUNTER
Test Results  Who is calling?:  patient  Who ordered the test:  11/25/2019  Type of test: Lab- Preg test  Date of test:  11/25/2019  Where was the test performed:  [facility]  What are your questions/concerns?:  Results of HCG  Okay to leave a detailed message?:  Yes      Please call patient with HCG findings.

## 2021-06-03 NOTE — PROGRESS NOTES
"  Office Visit - Follow Up   Fabian Springer   24 y.o. female    Date of Visit: 11/25/2019         Assessment and Plan   1. Major depressive disorder, recurrent, severe without psychotic behavior (H)  She is a bit more anxious.   Did break up recently from a boyfriend Propanolol was recently added to her regimen.  She is compliant with other tests she feels her veins in her hands are a bit prominent I told her there was nothing wrong on exam and that is not an abnormal thing to see occasional veins popping up in the hand.  She does have her arms that are show folliculitis which she has been picking at them we will do the pregnancy test today and then see if she could benefit from some doxycycline.  She has an IUD in place but feels that she still needs to make sure she is not pregnant.  She did have a Pap smear 2 years ago.  We will have her come back in 3 months for full physical Pap and GC testing.  - Pregnancy, Urine            Return in about 6 months (around 5/25/2020) for Annual physical.     History of Present Illness   This 24 y.o. old   Chief Complaint   Patient presents with     Follow-up     3 month follow up / would like pregnancy test        Review of Systems: A comprehensive review of systems was negative except as noted.     Medications, Allergies and Problem List   Reviewed, reconciled and updated  Patient Active Problem List   Diagnosis     Mood disorder (H)     Major depressive disorder, recurrent, severe without psychotic behavior (H)     Posttraumatic stress disorder     Alcohol abuse     Moderate persistent asthma with exacerbation     Vocal cord dysfunction        Physical Exam   General Appearance:       /62 (Patient Site: Right Arm, Patient Position: Sitting, Cuff Size: Adult Large)   Pulse 74   Ht 5' 2\" (1.575 m)   Wt 215 lb (97.5 kg)   SpO2 97%   BMI 39.32 kg/m       General appearance - alert, well appearing, and in no distress  Mental status - alert, oriented to person, " place, and time  Skin - normal coloration and turgor, no rashes, no suspicious skin lesions noted  Pustular lesions on both arms.  Veins are normal no palpable thrombophlebitis   hand joints look normal there is no synovitis.                                                                                   Additional Information   Current Outpatient Medications   Medication Sig Dispense Refill     albuterol (PROAIR HFA;PROVENTIL HFA;VENTOLIN HFA) 90 mcg/actuation inhaler Inhale 2 puffs every 6 (six) hours as needed for wheezing (prn shoertness of breath.). 1 Inhaler 0     albuterol (PROVENTIL) 2.5 mg /3 mL (0.083 %) nebulizer solution Take 3 mL (2.5 mg total) by nebulization every 4 (four) hours as needed for wheezing. 25 vial 2     budesonide-formoterol (SYMBICORT) 80-4.5 mcg/actuation inhaler Inhale 2 puffs 2 (two) times a day. 1 Inhaler 12     cetirizine (ZYRTEC) 10 MG tablet Take 1 tablet (10 mg total) by mouth daily as needed for allergies.  0     fluticasone propionate (FLONASE) 50 mcg/actuation nasal spray 2 sprays into each nostril daily. 10 g 11     hydrOXYzine pamoate (VISTARIL) 50 MG capsule Take 1 capsule (50 mg total) by mouth every 4 (four) hours as needed for anxiety. 30 capsule 0     ibuprofen (ADVIL,MOTRIN) 200 MG tablet Take 600 mg by mouth every 8 (eight) hours as needed for pain or headaches.       melatonin 3 mg Tab tablet Take 1 tablet (3 mg total) by mouth at bedtime as needed (Sleep).  0     naproxen (NAPROSYN) 500 MG tablet Take 1 tablet (500 mg total) by mouth 2 (two) times a day with meals. 60 tablet 2     prazosin (MINIPRESS) 1 MG capsule Take 1 mg by mouth.       propantheline (PROBANTHINE) 15 MG tablet Take 15 mg by mouth 2 (two) times a day.       senna-docusate (PERICOLACE) 8.6-50 mg tablet Take 1 tablet by mouth 2 (two) times a day. 60 tablet 0     sertraline (ZOLOFT) 100 MG tablet Take 100 mg by mouth.       traZODone (DESYREL) 50 MG tablet Take 1-2 tablets ( mg total) by  mouth at bedtime as needed, may repeat once for sleep. 30 tablet 0     propranolol (INDERAL) 20 MG tablet Take 1 tablet (20 mg total) by mouth 2 (two) times a day. 90 tablet 3     No current facility-administered medications for this visit.      No Known Allergies  Social History     Social History Narrative    Currently in residential house , might go to grandparents house      born in [facility] , living in [place] , moved to [place] the day she went ot [facility] . Has a .       reports that she quit smoking about 3 months ago. She has a 0.13 pack-year smoking history. She quit smokeless tobacco use about 3 months ago. She reports current alcohol use of about 107.0 standard drinks of alcohol per week. She reports that she does not use drugs.   Past Medical History:   Diagnosis Date     Anxiety      Asthma      Depression      PTSD (post-traumatic stress disorder)            Time: total time spent with the patient was 15 minutes of which >50% was spent in counseling and coordination of care     Maddi Lance MD

## 2021-06-03 NOTE — TELEPHONE ENCOUNTER
Left detailed message for the patient relaying message below from Dr. Lance.  Asked that she call with any further questions.  Safia DAVIS CMA/ANAHY....................2:50 PM

## 2021-06-04 VITALS
OXYGEN SATURATION: 98 % | RESPIRATION RATE: 14 BRPM | HEART RATE: 71 BPM | TEMPERATURE: 98.3 F | SYSTOLIC BLOOD PRESSURE: 108 MMHG | DIASTOLIC BLOOD PRESSURE: 75 MMHG | WEIGHT: 227 LBS | BODY MASS INDEX: 41.52 KG/M2

## 2021-06-05 VITALS
HEIGHT: 65 IN | RESPIRATION RATE: 16 BRPM | SYSTOLIC BLOOD PRESSURE: 110 MMHG | BODY MASS INDEX: 37.75 KG/M2 | TEMPERATURE: 97.8 F | WEIGHT: 226.56 LBS | DIASTOLIC BLOOD PRESSURE: 70 MMHG | HEART RATE: 72 BPM

## 2021-06-05 VITALS
BODY MASS INDEX: 41.48 KG/M2 | HEIGHT: 64 IN | OXYGEN SATURATION: 98 % | WEIGHT: 243 LBS | HEART RATE: 96 BPM | RESPIRATION RATE: 16 BRPM

## 2021-06-05 VITALS
TEMPERATURE: 97.8 F | WEIGHT: 243.06 LBS | RESPIRATION RATE: 20 BRPM | HEART RATE: 84 BPM | BODY MASS INDEX: 40.5 KG/M2 | DIASTOLIC BLOOD PRESSURE: 60 MMHG | HEIGHT: 65 IN | SYSTOLIC BLOOD PRESSURE: 110 MMHG

## 2021-06-08 NOTE — TELEPHONE ENCOUNTER
Patient calls today with suspected COVID-19 symptoms. Symptoms first started last night but have been getting worse throughout the day today. Reports cough. Fever 100.4F. Mild shortness of breath and chest discomfort. Patient also mentions she has a hx of asthma.     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     I recommended patient be evaluated by a provider d/t her symptoms of shortness of breath and chest discomfort. Referred to [email or web address] to start a visit. Patient will be connected with virtual provider who can assess her symptoms and may order a COVID-19 test. Advised patient to be seen in the ED if her symptoms worsen including increased chest pain, difficulty breathing, or fevers over 103F.     Your symptoms show that you may have coronavirus (COVID-19). This illness can cause fever, cough and trouble breathing. Many people get a mild case and get better on their own. Some people can get very sick.     Not all patients are tested for COVID-19. If you need to be tested, your care team will let you know.     How can I protect others?    Without a test, we can't know for sure that you have COVID-19. For safety, it's very important to follow these rules.    Stay home and away from others (self-isolate) until:    At least 10 days have passed since your symptoms started. And     You've had no fever--and no medicine that reduces fever--for 3 full days (72 hours). And      Your other symptoms have resolved (gotten better).     During this time:    Stay in your own room (and use your own bathroom), if you can.    Stay away from others in your home. No hugging, kissing or shaking hands.    No visitors.    Don't go to work, school or anywhere else.     Clean  high touch  surfaces often (doorknobs, counters, handles, etc.). Use a household cleaning spray or wipes.    Cover your mouth and nose with a mask, tissue  or wash cloth to avoid spreading germs.    Wash your hands and face often. Use soap and water.    For more tips, go to https://www.cdc.gov/coronavirus/2019-ncov/downloads/10Things.pdf.    How can I take care of myself?    1. Get lots of rest. Drink extra fluids (unless a doctor has told you not to).     2. Take Tylenol (acetaminophen) for fever or pain. If you have liver or kidney problems, ask your family doctor if it's okay to take Tylenol.     Adults can take either:     650 mg (two 325 mg pills) every 4 to 6 hours, or     1,000 mg (two 500 mg pills) every 8 hours as needed.     Note: Don't take more than 3,000 mg in one day.   Acetaminophen is found in many medicines (both prescribed and over-the-counter medicines). Read all labels to be sure you don't take too much.   For children, check the Tylenol bottle for the right dose. The dose is based on the child's age or weight.    3. If you have other health problems (like cancer, heart failure, an organ transplant or severe kidney disease): Call your specialty clinic if you don't feel better in the next 2 days.    4. Know when to call 911: If your breathing is so bad that it keeps you from doing normal activities, call 911 or go to the emergency room. Tell them that you've been staying home and may have COVID-19.      What are the symptoms of COVID-19?     The most common symptoms are cough, fever and trouble breathing.     Less common symptoms include body aches, chills, diarrhea (loose, watery poops), fatigue (feeling very tired), headache, runny nose, sore throat and loss of smell.     COVID-19 can cause severe coughing (bronchitis) and lung infection (pneumonia).    How does it spread?     The virus may spread when a person coughs or sneezes into the air. The virus can travel about 6 feet this way, and it can live on surfaces.      Common  (household disinfectants) will kill the virus.    Who is at risk?  Anyone can catch COVID-19 if they're around  "someone who has the virus.    How can others protect themselves?     Stay away from people who have COVID-19 (or symptoms of COVID-19).    Wash hands often with soap and water. Or, use hand  with at least 60% alcohol.    Avoid touching the eyes, nose or mouth.     Wear a face mask when you go out in public, when sick or when caring for a sick person.      For more about COVID-19 and caring for yourself at home, please visit the CDC website at https://www.cdc.gov/coronavirus/2019-ncov/about/steps-when-sick.html.     To learn about care at [facility], go to [email or web address].    Below are the COVID-19 hotlines at the [place] Department [facility] ([facility]). Interpreters are available.     For health questions: Call 936-579-6003 or 1-328.874.5571 (7 a.m. to 7 p.m.)    For questions about schools and childcare: Call 984-994-7646 or 1-121.877.1494 (7 a.m. to 7 p.m.)    Apple Fall RN        Reason for Disposition    MILD difficulty breathing (e.g., minimal/no SOB at rest, SOB with walking, pulse <100)    Chest pain    Answer Assessment - Initial Assessment Questions  1. COVID-19 DIAGNOSIS: \"Who made your Coronavirus (COVID-19) diagnosis?\" \"Was it confirmed by a positive lab test?\" If not diagnosed by a HCP, ask \"Are there lots of cases (community spread) where you live?\" (See public health department website, if unsure)    * MAJOR community spread: high number of cases; numbers of cases are increasing; many people hospitalized.    * MINOR community spread: low number of cases; not increasing; few or no people hospitalized      MODERATE  2. ONSET: \"When did the COVID-19 symptoms start?\"       Last evening  3. WORST SYMPTOM: \"What is your worst symptom?\" (e.g., cough, fever, shortness of breath, muscle aches)      Cough and mild shortness of breath.  4. COUGH: \"Do you have a cough?\" If so, ask: \"How bad is the cough?\"        moderate  5. FEVER: \"Do you have a fever?\" If so, " "ask: \"What is your temperature, how was it measured, and when did it start?\"      Yes. Fevers 100.4F overnight.  6. RESPIRATORY STATUS: \"Describe your breathing?\" (e.g., shortness of breath, wheezing, unable to speak)       Reports mild shortness of breath. No audible wheezing. Speaking comfortably over the phone.  7. BETTER-SAME-WORSE: \"Are you getting better, staying the same or getting worse compared to yesterday?\"  If getting worse, ask, \"In what way?\"      Worse compared to yesterday.  8. HIGH RISK DISEASE: \"Do you have any chronic medical problems?\" (e.g., asthma, heart or lung disease, weak immune system, etc.)      The patient has asthma.  10. OTHER SYMPTOMS: \"Do you have any other symptoms?\"  (e.g., runny nose, headache, sore throat, loss of smell)        Sore throat, cough, fevers, shortness of breath and chest discomfort.    Protocols used: CORONAVIRUS (COVID-19) DIAGNOSED OR [id number] 4.22.20      "

## 2021-06-11 NOTE — TELEPHONE ENCOUNTER
Who is requesting the letter?  Patient   Why is the letter needed? Caller is needing a return back to work letter due to her hospital stay, and is needing this done as soon as possible since she wants to return back to work tomorrow.    How would you like this letter returned? My chart   Okay to leave a detailed message? Yes

## 2021-06-11 NOTE — TELEPHONE ENCOUNTER
Patient last seen 11/25/19, was in ER yesterday. She wants a return to work note for tomorrow. Your schedule is full. Should she see any provider for ER follow up or will you write her a return to work letter?    FINAL IMPRESSION:  1. Syncope, unspecified syncope type    2. Concussion with loss of consciousness, initial encounter    3. Injury of head, initial encounter    4. Hypokalemia

## 2021-06-13 NOTE — PROGRESS NOTES
FEMALE PREVENTATIVE EXAM    Assessment and Plan:     Problem List Items Addressed This Visit     Major depressive disorder, recurrent, severe without psychotic behavior (H)     Continue follow up with psychiatry.         Moderate persistent asthma with exacerbation     Patient following with ENT. Symptoms not under adequate control. Patient reports she is using a steroid inhaler twice daily but she isn't sure which one and it isn't on her chart. Follow up with her ENT provider recommended.         Relevant Orders    Pneumococcal polysaccharide vaccine 23-valent 1 yo or older, subq/IM (Completed)    Generalized anxiety disorder     Continue follow up with psychiatry.           Other Visit Diagnoses     Routine general medical examination at a health care facility    -  Primary    Relevant Orders    Lipid Mifflin FASTING (Completed)    Basic Metabolic Panel (Completed)    Glycosylated Hemoglobin A1c (Completed)    HIV Antigen/Antibody Screening Cascade (Completed)    Hepatitis C Antibody (Anti-HCV) (Completed)    Chlamydia trachomatis & Neisseria gonorrhoeae, Amplified Detection (Completed)    Pap smear for cervical cancer screening        Relevant Orders    Gynecologic Cytology (PAP Smear) (Completed)    Encounter for IUD removal        Relevant Orders    Removal of Intrauterine Device        Patient Instructions   1. We will notify you of lab results.  2. Please continue follow up with ENT and psychiatry.         Subjective:   HPI: Fabian Springer is an 25 y.o. female here for a preventative health visit.     The patient would like her IUD removed. She has had this IUD for one year. She thinks it is a Mirena. She had another IUD for 5 years prior to this one. She has not had a menses in 6 years. She reports menses where irregular but not heavy prior to placement. She no longer needs it for birthcontrol so would like it removed. She is not having any problems with it.     She has chronic issues with asthma. She  "follows with an ENT provider for this. She reports that she takes a steroid inhaler twice daily but isn't sure of the name. She takes albuterol as needed as well.     The patient is following with psychiatry and psychology for her mental health issues.    Do you feel you are safe where you are living?: Yes (11/24/2020  9:14 AM)  Do you feel you are safe in your relationship(s)?: Yes (11/24/2020  9:14 AM)     Patient Active Problem List   Diagnosis     Major depressive disorder, recurrent, severe without psychotic behavior (H)     Posttraumatic stress disorder     Moderate persistent asthma with exacerbation     Vocal cord dysfunction     Generalized anxiety disorder      Past Medical History:   Diagnosis Date     Anxiety      Asthma      Depression      PTSD (post-traumatic stress disorder)       Past Surgical History:   Procedure Laterality Date     EYE SURGERY  08/1998    tear duct      Family History   Problem Relation Age of Onset     Lung cancer Maternal Grandmother      Lung cancer Maternal Grandfather      Alzheimer's disease Paternal Grandfather      No Medical Problems Mother         Patient has no contact with her     No Medical Problems Father      Other Brother         Lymes Disease      Social History     Tobacco Use     Smoking status: Never Smoker     Smokeless tobacco: Never Used   Substance Use Topics     Alcohol use: Yes     Alcohol/week: 2.0 standard drinks     Types: 2 Standard drinks or equivalent per week      Review of Systems    Objective:   Vital Signs:   /70 (Patient Site: Left Arm, Patient Position: Sitting, Cuff Size: Adult Large)   Pulse 72   Temp 97.8  F (36.6  C) (Oral)   Resp 16   Ht 5' 4.7\" (1.643 m)   Wt (!) 226 lb 9 oz (102.8 kg)   LMP  (LMP Unknown)   Breastfeeding No   BMI 38.05 kg/m       PHYSICAL EXAM  Physical Exam  Constitutional:       General: She is not in acute distress.     Appearance: She is well-developed.   HENT:      Right Ear: Tympanic membrane and ear " canal normal.      Left Ear: Tympanic membrane and ear canal normal.   Eyes:      Conjunctiva/sclera: Conjunctivae normal.      Pupils: Pupils are equal, round, and reactive to light.   Neck:      Musculoskeletal: Normal range of motion and neck supple.      Thyroid: No thyromegaly.   Cardiovascular:      Rate and Rhythm: Normal rate and regular rhythm.      Heart sounds: No murmur.   Pulmonary:      Effort: Pulmonary effort is normal. No respiratory distress.      Breath sounds: Normal breath sounds. No wheezing or rhonchi.   Chest:      Breasts:         Right: No inverted nipple, mass or skin change.         Left: No inverted nipple, mass or skin change.   Abdominal:      General: Bowel sounds are normal. There is no distension.      Palpations: Abdomen is soft. There is no mass.      Tenderness: There is no abdominal tenderness.      Hernia: There is no hernia in the ventral area.   Genitourinary:     Vagina: Normal.      Cervix: No discharge or friability.      Adnexa:         Right: No mass.          Left: No mass.        Comments: IUD strings visible, normal in length and position.  Lymphadenopathy:      Cervical: No cervical adenopathy.   Skin:     General: Skin is warm.      Findings: No rash.   Neurological:      Mental Status: She is alert and oriented to person, place, and time.      Cranial Nerves: No cranial nerve deficit.      Gait: Gait normal.      Deep Tendon Reflexes:      Reflex Scores:       Patellar reflexes are 2+ on the right side and 2+ on the left side.  Psychiatric:         Behavior: Behavior normal.         Thought Content: Thought content normal.         Judgment: Judgment normal.     Removal of Intrauterine Device    Date/Time: 11/24/2020 10:00 AM  Performed by: Cris Vaughn MD  Authorized by: Cris Vaughn MD   Consent: Verbal consent obtained. Written consent not obtained.  Risks and benefits: risks, benefits and alternatives were discussed  Consent given by:  patient  Comments: Strings visible, normal in length and location.  Strings grasped with ring forceps and IUD removed without difficulty.

## 2021-06-15 NOTE — PROGRESS NOTES
HISTORY OF PRESENT ILLNESS  Asked to see by Dr. Vaughn for evaluation of vocal cord dysfunction. Patient reports that her primary ENT recently retired. She was diagnosed with vocal cord dysfunction and asthma. She reports that her ENT didn't feel that she needed treatment for asthma. She had been on inhalers in the past but didn't find that the medications helped. She also reports frequent asthma attacks, she feels that her throat is closing. If she talks for long periods of time her voice with drop an octave. With heat and humidity she has a hard time breathing.     REVIEW OF SYSTEMS  Review of Systems: a 10-system review was performed. Pertinent positives are noted in the HPI and on a separate scanned document in the chart.    PMH, PSH, FH and SH has documented in the EHR.      EXAM    CONSTITUTIONAL  General Appearance:  Normal, well developed, well nourished, no obvious distress  Ability to Communicate:  communicates appropriately.    HEAD AND FACE  Appearance and Symmetry:  Normal, no scalp or facial scarring or suspicious lesions.  Paranasal sinuses tenderness:  Normal, Paranasal sinuses non tender    EYE  Normal external eye, conjunctiva, lids, cornea.     EARS  Clinical speech reception threshold:  Normal, able to hear normal speech.  Auricle:  Normal, Auricles without scars, lesions, masses.  External auditory canal:  Normal, External auditory canal normal.  Tympanic membrane:  Normal, Tympanic membranes normal without swelling or erythema.  Tympanic membrane mobility:  Normal, Normal tympanic membrane mobility.    NOSE (speculum or scope)  Architecture:  Normal, Grossly normal external nasal architecture with no masses or lesions.  Mucosa:  Normal mucosa, No polyps or masses.  Septum:  Normal, Septum non-obstructing.  Turbinates:  Normal, No turbinate abnormalities    ORAL CAVITY AND OROPHARYNX  Lips:  Normal.  Dental and gingiva:  Normal, No obvious dental or gingival disease.  Mucosa:  Normal, Moist  mucous membranes.  Tongue:  Normal, Tongue mobile with no mucosal abnormalities  Hard and soft palate:  Normal, Hard and soft palate without cleft or mucosal lesions.  Oral pharynx:  Normal, Posterior pharynx without lesions or remarkable asymmetry.  Saliva:  Normal, Clear saliva.  Masses:  Normal, No palpable masses or pathologically enlarged lymph nodes.    LARYNGOSCOPY  Risks and benefit of Flexible laryngeal endoscopy were discussed with the patient and they wished to proceed.  Patient tolerated the procedure well.  See exam note for findings.    LARYNX AND HYPOPHARYNX (mirror or scope)  Voice Quality:  Normal voice quality.  Supra glottis:  Normal, No edema or erythema.  Epiglottis:  Normal, No epiglottic mass or mucosal lesions.  Pyriform sinuses:  Normal, Pyriform sinuses clear.  Vocal cords appearance:  Normal, Vocal cord motion symmetric.  Vocal cord motion:  Normal, Vocal cord motion symmetric  Endolarynx:  Normal, No Endolaryngeal mass or mucosal lesions.    NECK  Masses/lymph nodes:  Normal, No worrisome neck masses or lymph nodes.  Salivary glands:  Normal, Parotid and submandibular glands.  Trachea and larynx position:  Normal, Trachea and larynx midline.  Thyroid:  Normal, No thyroid abnormality.  Tenderness:  Normal, No cervical tenderness.  Suppleness:  Normal, Neck supple    CARDIOVASCULAR  Regular rate and rhythm.  No cyanosis, clubbing or edema.    PULSES  Carotid pulses normal    NEUROLOGICAL  Speech pattern:  Normal, Proasaic    RESPIRATORY  Symmetry and Respiratory effort:  Normal, Symmetric chest movement and expansion with no increased intercostal retractions or use of accessory muscles.     IMPRESSION  1. Asthma based on patient's history. However the patient isn't being treated and describes periodic periods of shortness of breath and difficulty breathing.   2. Patient also describes changes in the pitch of her voice with prolonged use.   3. Today's ENT exam is unremarkable.      RECOMMENDATION  1. Her diagnosis of asthma is uncertain. I advised that she see an Allergist to determine if in fact that she has asthma.  2. Speech therapy evaluation. I discussed the the typical management of voice issues is speech and language pathology eval. She expressed understanding.     Malik Camargo MD

## 2021-06-15 NOTE — TELEPHONE ENCOUNTER
LM that speech therapy number is [phone] and they have Dr Camargo' order. They reached her twice in the past but she was going to call back to schedule and never did. Told her I'll email the number via [employer] too.

## 2021-06-15 NOTE — PATIENT INSTRUCTIONS - HE
1. Ketoconazole shampoo twice weekly for 2 weeks. Leave in place for 5 minutes then rinse. Continue weekly use long term.

## 2021-06-15 NOTE — PROGRESS NOTES
"      Subjective   Fabian Springer is 25 y.o. and presents today for the following health issues   HPI   Chief complaint: Asthma    History of present illness: This is a pleasant 25-year-old woman I was asked to see for evaluation by Dr. Camargo in regards to asthma.  The patient states that she had asthma when she was about 17 or 18 and initially presented as exercise-induced bronchospasm.  She now states her symptoms are more persistent and occur with anything she does.  She states she cannot do many activities without feeling that she will have a full-blown asthma attack.  She states that she does not wake up at night short breath currently but has happened in the past especially when she is having some anxiety in her sleep.  She has albuterol inhaler but it does not relieve symptoms.  Her albuterol nebulizer does improve symptoms, however.  She has a known history of environmental allergies but has never been tested.  She states symptoms consist of itchy eyes, watery eyes, sneezing and drainage.  She has used over-the-counter Claritin which does help but it does not improve the breathing.  She has been referred speech therapy by ENT but has not been yet called to schedule this appointment.  She states symptoms are often difficulty to breathing but she does note some difficulty breathing out as well.    Past medical history: PTSD, tear duct surgery, anxiety disorder    Social history: She lives in an apartment, works as a CNA, no pets non-smoker    Family history: Father with asthma and allergies    Review of Systems performed as above and the remainder is negative.          Objective    Pulse 96   Resp 16   Ht 5' 4\" (1.626 m)   Wt (!) 243 lb (110.2 kg)   SpO2 98%   BMI 41.71 kg/m    Body mass index is 41.71 kg/m .  Physical Exam  Gen: Pleasant female not in acute distress  HEENT: Eyes no erythema of the bulbar or palpebral conjunctiva, no edema. Ears: TMs well visualized, no effusio note of arms or lesions " ns. Nose: No congestion, mucosa normal. Mouth: Throat clear, no lip or tongue edema.   Cardiac: Regular rate and rhythm, no murmurs, rubs or gallops  Respiratory: Clear to auscultation bilaterally, no adventitious breath sounds  Lymph: No visible supraclavicular or cervical lymphadenopathy  Skin: No rashes or lesions  Psych: Alert and oriented times 3        Last Percutaneous Allergy Test Results  Trees  Tez, White  1:20 H  (W/F in mm): 0/0 (03/15/21 1629)  Birch Mix 1:20 H (W/F in mm): 0/0 (03/15/21 1629)  [place], Common 1:20 H (W/F in mm): 0/0 (03/15/21 1629)  Elm, American 1:20 H (W/F in mm): 0/0 (03/15/21 1629)  [place], Shagbark 1:20 H (W/F in mm): 0/0 (03/15/21 1629)  Maple, Hard/Sugar 1:20 H (W/F in mm): 0/0 (03/15/21 1629)  [place] Mix 1:20 H (W/F in mm): 0/0 (03/15/21 1629)  [place], Red 1:20 H (W/F in mm): 0/0 (03/15/21 1629)  Clyde, American 1:20 H (W/F in mm): 0/0 (03/15/21 1629)  [place] Tree 1:20 H (W/F in mm): 0/0 (03/15/21 1629)  Dust Mites  D. Pteronyssinus Mite 30,000 AU/ML H (W/F in mm): 30/45 (03/15/21 1629)  D. Farinae Mite 30,000 AU/ML H (W/F in mm: 27/45 (03/15/21 1629)  Grasses  Grass Mix #4 10,000 BAU/ML H: 0/0 (03/15/21 1629)  Miguel Grass 1:20 H (W/F in mm): 0/0 (03/15/21 1629)  Cockroach  Cockroach Mix 1:10 H (W/F in mm): 0/0 (03/15/21 1629)  Molds/Fungi  Alternaria Tenuis 1:10 H (W/F in mm): 0/0 (03/15/21 1629)  Aspergillus Fumigatus 1:10 H (W/F in mm): 0/0 (03/15/21 1629)  Homodendrum Cladosporioides 1:10 H (W/F in mm): 0/0 (03/15/21 1629)  Penicillin Notatum 1:10 H (W/F in mm): 0/0 (03/15/21 1629)  Epicoccum 1:10 H (W/F in mm): 0/0 (03/15/21 1629)  Weeds  Ragweed, Short 1:20 H (W/F in mm): 15/35 (03/15/21 1629)  Dock, Sorrel 1:20 H (W/F in mm): 0/0 (03/15/21 1629)  Lamb's Quarter 1:20 H (W/F in mm): 0/0 (03/15/21 1629)  Pigweed, Rough Red Root 1:20 H  (W/F in mm): 0/0 (03/15/21 1629)  Plantain, English 1:20 H  (W/F in mm): 0/0 (03/15/21 1629)  Sagebrush, Mugwort 1:20 H  (W/F in  mm): 0/0 (03/15/21 1629)  Animal  Cat 10,000 BAU/ML H (W/F in mm): 0/0 (03/15/21 1629)  AP Dog Hair/Dander 1:100: 0/0 (03/15/21 1629)  Controls  Device Type: QUINTIP (03/15/21 1629)  Neg. control: 50% Glycerine/Saline H (W/F in mm): 0/0 (03/15/21 1629)  Pos. control: Histamine 6mg/ML (W/F in mms): 5/30 (03/15/21 1629)    Impression report and plan:    1.  Mild to moderate persistent asthma  2.  Allergic rhinitis dust mite  3.  Allergic rhinitis to ragweed  4.  Vocal cord function    I would recommend that patient start Dulera 200/5 2 puffs twice daily.  Follow in a month.  I would like to order complete pulmonary function test.  Reviewed environmental control regarding dust mite.  She did not receive the call back for speech therapy.  I will figure out where this is this I think she does have a significant component of vocal cord dysfunction.

## 2021-06-15 NOTE — PATIENT INSTRUCTIONS - HE
Dust mite control    Wash bedding weekly, covers, keep humidity <50%    Complete PFT    Dulera 200/5 2 puffs twice daily     Speech therapy     Follow in 1 month

## 2021-06-15 NOTE — PROGRESS NOTES
Assessment/Plan:      Problem List Items Addressed This Visit     Moderate persistent asthma with exacerbation    Relevant Orders    Ambulatory referral to ENT    Vocal cord dysfunction    Relevant Orders    Ambulatory referral to ENT    Dandruff - Primary     No significant inflammation present suggestive of psoriasis or other inflammatory condition. Ketoconazole shampoo twice weekly for 2 weeks, then weekly for long term use.          Relevant Medications    ketoconazole (NIZORAL) 2 % shampoo          Patient Instructions   1. Ketoconazole shampoo twice weekly for 2 weeks. Leave in place for 5 minutes then rinse. Continue weekly use long term.        No follow-ups on file.    Subjective:   Fabian Springer is a 25 y.o. female who presents today with concerns about flaking or her scalp. This has been bothersome for a long time but is bothering her more since she is now wearing her hair very short. Over the counter products have not been helpful. No other skin conditions that she knows of and no prior diagnosis. There is a family history of psoriasis and eczema.    The patient is also requesting referral to a new ENT. She has followed with ENT about her vocal cord dysfunction and asthma. Her current provider has retired and she would like a new referral.    Patient Active Problem List   Diagnosis     Major depressive disorder, recurrent, severe without psychotic behavior (H)     Posttraumatic stress disorder     Moderate persistent asthma with exacerbation     Vocal cord dysfunction     Generalized anxiety disorder     Morbid obesity (H)     Dandruff      Past Medical History:   Diagnosis Date     Anxiety      Asthma      Depression      PTSD (post-traumatic stress disorder)      Past Surgical History:   Procedure Laterality Date     EYE SURGERY  08/1998    tear duct     Review of System: Relevant items noted in HPI. ROS otherwise negative.     Objective:     Vitals:    02/09/21 1346   BP: 110/60   Pulse: 84  "  Resp: 20   Temp: 97.8  F (36.6  C)   TempSrc: Oral   Weight: (!) 243 lb 1 oz (110.3 kg)   Height: 5' 4.7\" (1.643 m)       Physical Exam  Constitutional:       General: She is not in acute distress.     Appearance: She is not ill-appearing.   Skin:     Comments: Mild flaking of skin on scalp. No other rash, inflammation, or breaks in the skin.       "

## 2021-06-16 PROBLEM — F33.2 MAJOR DEPRESSIVE DISORDER, RECURRENT, SEVERE WITHOUT PSYCHOTIC BEHAVIOR (H): Status: ACTIVE | Noted: 2019-08-06

## 2021-06-16 PROBLEM — L21.0 DANDRUFF: Status: ACTIVE | Noted: 2021-02-09

## 2021-06-16 PROBLEM — J38.3 VOCAL CORD DYSFUNCTION: Status: ACTIVE | Noted: 2019-08-22

## 2021-06-16 PROBLEM — J45.41 MODERATE PERSISTENT ASTHMA WITH EXACERBATION: Status: ACTIVE | Noted: 2019-08-22

## 2021-06-16 PROBLEM — F43.10 POSTTRAUMATIC STRESS DISORDER: Status: ACTIVE | Noted: 2019-08-06

## 2021-06-16 PROBLEM — E66.01 MORBID OBESITY (H): Status: ACTIVE | Noted: 2021-02-09

## 2021-06-16 PROBLEM — F41.1 GENERALIZED ANXIETY DISORDER: Status: ACTIVE | Noted: 2020-11-24

## 2021-06-16 NOTE — PROGRESS NOTES
RESPIRATORY CARE NOTE     Patient Name: Fabian Springer  Today's Date: 3/17/2021     Complete PFT done. Pt performed tests with good effort. Test results meet ATS criteria. Albuterol neb given. Results scanned into epic. Pt left in no distress.       Courtney Guerra, LRT    RESPIRATORY CARE NOTE     Patient Name: Fabian Springer  Today's Date: 3/17/2021     Problem List  Patient Active Problem List   Diagnosis     Major depressive disorder, recurrent, severe without psychotic behavior (H)     Posttraumatic stress disorder     Moderate persistent asthma with exacerbation     Vocal cord dysfunction     Generalized anxiety disorder     Morbid obesity (H)     Dandruff                           Courtney Guerra LRT

## 2021-06-18 NOTE — PATIENT INSTRUCTIONS - HE
Patient Instructions by Roxie Singh PA-C at 5/29/2020 12:00 PM     Author: Roxie Singh PA-C Service: -- Author Type: Physician Assistant    Filed: 5/29/2020 12:29 PM Encounter Date: 5/29/2020 Status: Addendum    : Roxie Singh PA-C (Physician Assistant)    Related Notes: Original Note by Roxie Singh PA-C (Physician Assistant) filed at 5/29/2020 12:25 PM       Patient Education     Sty (or Stye)  A sty is an infection of the oil gland of the eyelid. It may develop into a small pocket of pus (an abscess). This can cause pain, redness, and swelling. In early stages, a sty is treated with antibiotic cream, eye drops, or a small towel soaked in warm water (a warm compress). More severe cases may need to be opened and drained by a healthcare provider.  Home care    Eye drops or ointment are usually prescribed to treat the infection. Use these as directed.     Artificial tears may also be used to lubricate the eye and make it more comfortable. You can buy these over the counter without a prescription. Talk with your healthcare provider before using any over-the-counter treatment for a sty.    Apply a warm, damp towel to the affected eye for at least 5 minutes, 3 to 4 times a day for a week. Warm compresses open the pores and speed the healing. But if the compresses are too hot, they may burn your eyelid.    Sometimes the sty will drain with this treatment alone. If this happens, keep using the antibiotic until all the redness and swelling are gone.    Wash your hands before and after touching the infected eyelid to avoid spreading the infection.    Dont squeeze or try to break open the sty.  Follow-up care  Follow up with your healthcare provider, or as advised.   When to seek medical advice  Call your healthcare provider right away if any of these occur:    Increase in swelling or redness around the eyelid after 48 to 72 hours    Increase in eye pain or the eyelid blisters    Increase  in warmth--the eyelid feels hot    Drainage of blood or thick pus from the sty    Blister on the eyelid    Inability to open the eyelid due to swelling    Fever of 100.4 F (38 C) or above, or as directed by your provider    Vision changes    Headache or stiff neck    The sty comes back  Date Last Reviewed: 8/1/2017 2000-2017 [employer]. [address] [place] [address]. All rights reserved. This information is not intended as a substitute for professional medical care. Always follow your healthcare professional's instructions.         The infection of the eye is likely due to a skin condition called periorbital cellulitis. Will treat with a course of antibiotics. Take the full course of antibitoics, even if symptoms completely improve.    Symptom management:  - Maintain good hand hygiene ( the discharge from the eye is very contagious)  - May use warm compresses to help clean the eyes; use cold compresses to help with eye itchiness  - Avoid rubbing the eyes    Reasons to return immediately for re-evaluation:  - Fever of 100.4 or higher develops, or current fever worsens  - Spreading redness, swelling, and tenderness  - Vision changes  - Worsening eye pain  - Nausea or vomiting

## 2021-06-19 NOTE — LETTER
Letter by Ivania Wheeler MD at      Author: Ivania Wheeler MD Service: -- Author Type: --    Filed:  Encounter Date: 8/16/2019 Status: (Other)         August 16, 2019     Patient: Fabian Springer   YOB: 1995   Date of Visit: 8/16/2019       To Whom It May Concern:    It is my medical opinion that Fabian Springer cannot return to live in [place] because it is not conducive to her well-being.  She moved to [place].She has to go to treatment. She can not work.   Please excuse her from her obligations toward her apartment  in your apartment complex.  Fabian will this note to the appropriate authorities.    If you have any questions or concerns, please don't hesitate to call.    Sincerely,        Electronically signed by Ivania Wheeler MD     Fabian will give this note to the appropriate authorities

## 2021-06-19 NOTE — LETTER
Letter by Ivania Wheeler MD at      Author: Ivania Wheeler MD Service: -- Author Type: --    Filed:  Encounter Date: 8/16/2019 Status: (Other)         August 16, 2019     Patient: Fabian Springer   YOB: 1995   Date of Visit: 8/16/2019       To Whom It May Concern:    It is my medical opinion that Fabian Springer cannot work and she has to go to treatment for the next 3 months.  She will not be able to pay her car payment during that time.  Please excuse her from her obligation toward car alone for the next 3 months..    If you have any questions or concerns, please don't hesitate to call.    Sincerely,        Electronically signed by Ivania Wheeler MD   Fabian will give this note to the appropriate authorities.

## 2021-06-20 NOTE — LETTER
Letter by Maddi Lance MD at      Author: Maddi Lance MD Service: -- Author Type: --    Filed:  Encounter Date: 9/29/2020 Status: (Other)         September 30, 2020     Patient: Fabian Springer   YOB: 1995   Date of Visit: 9/29/2020       To Whom It May Concern:    It is my medical opinion that Fabian Springer may return to full duty immediately with no restrictions.    If you have any questions or concerns, please don't hesitate to call.    Sincerely,        Electronically signed by Maddi Lance MD

## 2021-06-20 NOTE — LETTER
Letter by Maddi Lance MD at      Author: Maddi Lance MD Service: -- Author Type: --    Filed:  Encounter Date: 9/29/2020 Status: (Other)         September 29, 2020     Patient: Fabian Springer   YOB: 1995   Date of Visit: 9/29/2020       To Whom It May Concern:    It is my medical opinion that Fabian Springer may return to full duty immediately with no restrictions.    If you have any questions or concerns, please don't hesitate to call.    Sincerely,        Electronically signed by Maddi Lance MD

## 2021-06-20 NOTE — LETTER
Letter by Holly Mckeon RN at      Author: Holly Mckeon RN Service: -- Author Type: --    Filed:  Encounter Date: 5/16/2020 Status: (Other)       5/16/2020        Fabian Springer  [address]  [facility] [address]    This letter provides a written record that you were tested for COVID-19 on 5/14/20.     Your result was negative.    This means that we didnt find the virus that causes COVID-19 in your sample. A test may show negative when you do actually have the virus. This can happen when the virus is in the early stages of infection, before you feel illness symptoms.    Even if you dont have symptoms, they may still appear. For safety, its very important to follow these rules.    Keep yourself away from others (self-isolation):      Stay home. Dont go to work, school or anywhere else.     Stay in your own room (and use your own bathroom), if you can.    Stay away from others in your home. No hugging, kissing or shaking hands. No visitors.    Clean high touch surfaces often (doorknobs, counters, handles, etc.). Use a household cleaning spray or wipes.    Cover your mouth and nose with a mask, tissue or washcloth to avoid spreading germs.    Wash your hands and face often with soap and water.    Stay in self-isolation until you meet ALL of the guidelines below:    1. You have had no fever for at least 72 hours (that is 3 full days of no fever without the use of medicine that reduces fevers), AND  2. other symptoms (such as cough, shortness of breath) have gotten better, AND  3. at least 10 days have passed since your symptoms first appeared.    Going back to work  Check with your employer for any guidelines to follow for going back to work.    Employers: This document serves as formal notice that your employee tested negative for COVID-19, as of the testing date shown above.    For questions regarding this letter or your Negative COVID-19 result, call [phone] between 8A to 6:30P (M-F) and  10A to 6:30P (weekends).

## 2021-06-21 NOTE — LETTER
Letter by Cris Vaughn MD at      Author: Cris Vaughn MD Service: -- Author Type: --    Filed:  Encounter Date: 11/24/2020 Status: (Other)                    My Depression Action Plan  Name: Fabian Springer   Date of Birth 1995  Date: 11/24/2020    My Doctor: Provider, No Primary Care   My Clinic: [address] [address]  [phone]          GREEN    ZONE   Good Control    What it looks like:     Things are going generally well. You have normal ups and downs. You may even feel depressed from time to time, but bad moods usually last less than a day.   What you need to do:  1. Continue to care for yourself (see self care plan)  2. Check your depression survival kit and update it as needed  3. Follow your physicians recommendations including any medication.  4. Do not stop taking medication unless you consult with your physician first.           YELLOW         ZONE Getting Worse    What it looks like:     Depression is starting to interfere with your life.     It may be hard to get out of bed; you may be starting to isolate yourself from others.    Symptoms of depression are starting to last most all day and this has happened for several days.     You may have suicidal thoughts but they are not constant.   What you need to do:     1. Call your care team. Your response to treatment will improve if you keep your care team informed of your progress. Yellow periods are signs an adjustment may need to be made.     2. Continue your self-care.  Just get dressed and ready for the day.  Don't give yourself time to talk yourself out of it.    3. Talk to someone in your support network.    4. Open up your depression Depression Self-Care Plan / Wellness kit.           RED    ZONE Medical Alert - Get Help    What it looks like:     Depression is seriously interfering with your life.     You may experience these or other symptoms: You cant get out of  bed most days, cant work or engage in other necessary activities, you have trouble taking care of basic hygiene, or basic responsibilities, thoughts of suicide or death that will not go away, self-injurious behavior.     What you need to do:  1. Call your care team and request a same-day appointment. If they are not available (weekends or after hours) call your local crisis line, emergency room or 911.            Self-Care Plan / Wellness Kit    Self-Care for Depression  Heres the deal. Your body and mind are really not as separate as most people think.  What you do and think affects how you feel and how you feel influences what you do and think. This means if you do things that people who feel good do, it will help you feel better.  Sometimes this is all it takes.  There is also a place for medication and therapy depending on how severe your depression is, so be sure to consult with your medical provider and/ or Behavioral Health Consultant if your symptoms are worsening or not improving.     In order to better manage my stress, I will:    Exercise  Get some form of exercise, every day. This will help reduce pain and release endorphins, the feel good chemicals in your brain. This is almost as good as taking antidepressants!  This is not the same as joining a gym and then never going! (they count on that by the way?) It can be as simple as just going for a walk or doing some gardening, anything that will get you moving.      Hygiene   Maintain good hygiene (get out of bed in the morning, make your bed, brush your teeth, take a shower, and get dressed like you were going to work, even if you are unemployed).  If your clothes don't fit try to get ones that do.    Diet  Strive to eat foods that are good for me, drink plenty of water, and avoid excessive sugar, caffeine, alcohol, and other mood-altering substances.  Some foods that are helpful in depression are: complex carbohydrates, B vitamins, flaxseed, fish or fish  oil, fresh fruits and vegetables.    Psychotherapy  Agree to participate in Individual Therapy (if recommended).    Medication  If prescribed medications, I agree to take them.  Missing doses can result in serious side effects.  I understand that drinking alcohol, or other illicit drug use, may cause potential side effects.  I will not stop my medication abruptly without first discussing it with my provider.    Staying Connected With Others  Stay in touch with my friends, family members, and my primary care provider/team.    Use your imagination  Be creative.  We all have a creative side; it doesnt matter if its oil painting, sand castles, or mud pies! This will also kick up the endorphins.    Witness Beauty  (AKA stop and smell the roses) Take a look outside, even in mid-winter. Notice colors, textures. Watch the squirrels and birds.     Service to others  Be of service to others.  There is always someone else in need.  By helping others we can get out of ourselves and remember the really important things.  This also provides opportunities for practicing all the other parts of the program.    Humor  Laugh and be silly!  Adjust your TV habits for less news and crime-drama and more comedy.    Control your stress  Try breathing deep, massage therapy, biofeedback, and meditation. Find time to relax each day.     Crisis Text Line  http://www.crisistextline.org    The Crisis Text Line serves anyone, in any type of crisis, providing access to free, 24/7 support and information via the medium people already use and trust:    Here's how it works:  1.  Text 793-224 from anywhere in the [place], anytime, about any type of crisis.  2.  A live, trained Crisis Counselor receives the text and responds quickly.  3.  The volunteer Crisis Counselor will help you move from a 'hot moment to a cool moment'.  My support system    Clinic Contact:  Phone number:    Contact 1:  Phone number:    Contact 2:  Phone number:    Hindu/spiritual  advisor:  Phone number:    Therapist:  Phone number:    [facility]:    Phone number:    Other community support:  Phone number:

## 2021-06-21 NOTE — LETTER
Letter by Kathleen Cortez MD at      Author: Kathleen Cortez MD Service: -- Author Type: --    Filed:  Encounter Date: 3/15/2021 Status: (Other)         March 15, 2021     Malik SNIDRE MD  [address] [address]    Patient: Fabian Springer   MR Number: [id number]   YOB: 1995   Date of Visit: 3/15/2021     Dear Dr. Raman MD:    Thank you for referring Fabian Springer to me for evaluation. I think she does have asthma.  She has significant dust mite allergy as well.  I have included my note for your review.      If you have questions, please do not hesitate to call me.    Sincerely,        Kathleen Cortez MD        CC  No Recipients    Progress Notes:      Subjective   Fabian Springer is 25 y.o. and presents today for the following health issues   HPI   Chief complaint: Asthma    History of present illness: This is a pleasant 25-year-old woman I was asked to see for evaluation by Dr. Camargo in regards to asthma.  The patient states that she had asthma when she was about 17 or 18 and initially presented as exercise-induced bronchospasm.  She now states her symptoms are more persistent and occur with anything she does.  She states she cannot do many activities without feeling that she will have a full-blown asthma attack.  She states that she does not wake up at night short breath currently but has happened in the past especially when she is having some anxiety in her sleep.  She has albuterol inhaler but it does not relieve symptoms.  Her albuterol nebulizer does improve symptoms, however.  She has a known history of environmental allergies but has never been tested.  She states symptoms consist of itchy eyes, watery eyes, sneezing and drainage.  She has used over-the-counter Claritin which does help but it does not improve the breathing.  She has been referred speech therapy by ENT but has not been yet called to schedule this appointment.  She states  "symptoms are often difficulty to breathing but she does note some difficulty breathing out as well.    Past medical history: PTSD, tear duct surgery, anxiety disorder    Social history: She lives in an apartment, works as a CNA, no pets non-smoker    Family history: Father with asthma and allergies    Review of Systems performed as above and the remainder is negative.          Objective    Pulse 96   Resp 16   Ht 5' 4\" (1.626 m)   Wt (!) 243 lb (110.2 kg)   SpO2 98%   BMI 41.71 kg/m    Body mass index is 41.71 kg/m .  Physical Exam  Gen: Pleasant female not in acute distress  HEENT: Eyes no erythema of the bulbar or palpebral conjunctiva, no edema. Ears: TMs well visualized, no effusio note of arms or lesions ns. Nose: No congestion, mucosa normal. Mouth: Throat clear, no lip or tongue edema.   Cardiac: Regular rate and rhythm, no murmurs, rubs or gallops  Respiratory: Clear to auscultation bilaterally, no adventitious breath sounds  Lymph: No visible supraclavicular or cervical lymphadenopathy  Skin: No rashes or lesions  Psych: Alert and oriented times 3        Last Percutaneous Allergy Test Results  Trees  Tez, White  1:20 H  (W/F in mm): 0/0 (03/15/21 1629)  Birch Mix 1:20 H (W/F in mm): 0/0 (03/15/21 1629)  [place], Common 1:20 H (W/F in mm): 0/0 (03/15/21 1629)  Elm, American 1:20 H (W/F in mm): 0/0 (03/15/21 1629)  [place], Shagbark 1:20 H (W/F in mm): 0/0 (03/15/21 1629)  Maple, Hard/Sugar 1:20 H (W/F in mm): 0/0 (03/15/21 1629)  [place] Mix 1:20 H (W/F in mm): 0/0 (03/15/21 1629)  [place], Red 1:20 H (W/F in mm): 0/0 (03/15/21 1629)  [place], American 1:20 H (W/F in mm): 0/0 (03/15/21 1629)  [place] Tree 1:20 H (W/F in mm): 0/0 (03/15/21 1629)  Dust Mites  D. Pteronyssinus Mite 30,000 AU/ML H (W/F in mm): 30/45 (03/15/21 1629)  D. Farinae Mite 30,000 AU/ML H (W/F in mm: 27/45 (03/15/21 1629)  Grasses  Grass Mix #4 10,000 BAU/ML H: 0/0 (03/15/21 1629)  Miguel Grass 1:20 H (W/F in mm): 0/0 (03/15/21 " 1629)  Cockroach  Cockroach Mix 1:10 H (W/F in mm): 0/0 (03/15/21 1629)  Molds/Fungi  Alternaria Tenuis 1:10 H (W/F in mm): 0/0 (03/15/21 1629)  Aspergillus Fumigatus 1:10 H (W/F in mm): 0/0 (03/15/21 1629)  Homodendrum Cladosporioides 1:10 H (W/F in mm): 0/0 (03/15/21 1629)  Penicillin Notatum 1:10 H (W/F in mm): 0/0 (03/15/21 1629)  Epicoccum 1:10 H (W/F in mm): 0/0 (03/15/21 1629)  Weeds  Ragweed, Short 1:20 H (W/F in mm): 15/35 (03/15/21 1629)  Dock, Sorrel 1:20 H (W/F in mm): 0/0 (03/15/21 1629)  Lamb's Quarter 1:20 H (W/F in mm): 0/0 (03/15/21 1629)  Pigweed, Rough Red Root 1:20 H  (W/F in mm): 0/0 (03/15/21 1629)  Plantain, English 1:20 H  (W/F in mm): 0/0 (03/15/21 1629)  Sagebrush, Mugwort 1:20 H  (W/F in mm): 0/0 (03/15/21 1629)  Animal  Cat 10,000 BAU/ML H (W/F in mm): 0/0 (03/15/21 1629)  AP Dog Hair/Dander 1:100: 0/0 (03/15/21 1629)  Controls  Device Type: QUINTIP (03/15/21 1629)  Neg. control: 50% Glycerine/Saline H (W/F in mm): 0/0 (03/15/21 1629)  Pos. control: Histamine 6mg/ML (W/F in mms): 5/30 (03/15/21 1629)    Impression report and plan:    1.  Mild to moderate persistent asthma  2.  Allergic rhinitis dust mite  3.  Allergic rhinitis to ragweed  4.  Vocal cord function    I would recommend that patient start Dulera 200/5 2 puffs twice daily.  Follow in a month.  I would like to order complete pulmonary function test.  Reviewed environmental control regarding dust mite.  She did not receive the call back for speech therapy.  I will figure out where this is this I think she does have a significant component of vocal cord dysfunction.

## 2021-06-29 NOTE — PROGRESS NOTES
Progress Notes by Roxie Singh PA-C at 5/29/2020 12:00 PM     Author: Roxie Singh PA-C Service: -- Author Type: Physician Assistant    Filed: 5/29/2020 12:39 PM Encounter Date: 5/29/2020 Status: Signed    : Roxie Singh PA-C (Physician Assistant)         Assessment & Plan:       1. Preseptal cellulitis of left eye  sulfamethoxazole-trimethoprim (BACTRIM DS) 800-160 mg per tablet    amoxicillin (AMOXIL) 875 MG tablet   2. Hordeolum externum of left lower eyelid        Medical Decision Making  Patient presents with acute onset left lower eyelid swelling.  Had initial concerns for a stye, however the swelling is involved the entire left lower eyelid, is tender to touch, and was more erythematous this morning.  He symptoms are concerning for possible preseptal cellulitis of the left eye.  Treat patient with oral antibiotics and have close follow-up if symptoms do not improve or worsen with ophthalmology.  Recommend patient continue warm compresses for appearance of stye at the medial aspect of the left lower eyelid.    Patient Instructions   Patient Education     Sty (or Stye)  A sty is an infection of the oil gland of the eyelid. It may develop into a small pocket of pus (an abscess). This can cause pain, redness, and swelling. In early stages, a sty is treated with antibiotic cream, eye drops, or a small towel soaked in warm water (a warm compress). More severe cases may need to be opened and drained by a healthcare provider.  Home care    Eye drops or ointment are usually prescribed to treat the infection. Use these as directed.     Artificial tears may also be used to lubricate the eye and make it more comfortable. You can buy these over the counter without a prescription. Talk with your healthcare provider before using any over-the-counter treatment for a sty.    Apply a warm, damp towel to the affected eye for at least 5 minutes, 3 to 4 times a day for a week. Warm compresses open the  pores and speed the healing. But if the compresses are too hot, they may burn your eyelid.    Sometimes the sty will drain with this treatment alone. If this happens, keep using the antibiotic until all the redness and swelling are gone.    Wash your hands before and after touching the infected eyelid to avoid spreading the infection.    Dont squeeze or try to break open the sty.  Follow-up care  Follow up with your healthcare provider, or as advised.   When to seek medical advice  Call your healthcare provider right away if any of these occur:    Increase in swelling or redness around the eyelid after 48 to 72 hours    Increase in eye pain or the eyelid blisters    Increase in warmth--the eyelid feels hot    Drainage of blood or thick pus from the sty    Blister on the eyelid    Inability to open the eyelid due to swelling    Fever of 100.4 F (38 C) or above, or as directed by your provider    Vision changes    Headache or stiff neck    The sty comes back  Date Last Reviewed: 8/1/2017 2000-2017 [employer]. [address] [place] [address]. All rights reserved. This information is not intended as a substitute for professional medical care. Always follow your healthcare professional's instructions.         The infection of the eye is likely due to a skin condition called periorbital cellulitis. Will treat with a course of antibiotics. Take the full course of antibitoics, even if symptoms completely improve.    Symptom management:  - Maintain good hand hygiene ( the discharge from the eye is very contagious)  - May use warm compresses to help clean the eyes; use cold compresses to help with eye itchiness  - Avoid rubbing the eyes    Reasons to return immediately for re-evaluation:  - Fever of 100.4 or higher develops, or current fever worsens  - Spreading redness, swelling, and tenderness  - Vision changes  - Worsening eye pain  - Nausea or vomiting          Subjective:       Fabian HOUGH  Gian is a 24 y.o. female here for evaluation of left eye swelling and irritation.  Onset of symptoms was yesterday.  Patient notes redness, swelling, tenderness underneath the left eyelid.  She states that the swelling was worse last night, and has improved some throughout the day.  Associated symptoms include clear tearing of the left eye.  Patient otherwise denies fevers, vision changes, eye trauma, foreign body sensation, and purulent drainage.  She has no history of contact lens use.  Patient states she has frequent history of styes in the past occurring about twice every month, however she states that the swelling and pain is worse than her usual stye.    The following portions of the patient's history were reviewed and updated as appropriate: allergies, current medications and problem list.    Review of Systems  Pertinent items are noted in HPI.     Allergies  No Known Allergies    Family History   Problem Relation Age of Onset   ? Lung cancer Maternal Grandmother    ? Lung cancer Maternal Grandfather    ? Alzheimer's disease Paternal Grandfather        Social History     Socioeconomic History   ? Marital status: Single     Spouse name: None   ? Number of children: None   ? Years of education: None   ? Highest education level: None   Occupational History   ? None   Social Needs   ? Financial resource strain: None   ? Food insecurity     Worry: None     Inability: None   ? Transportation needs     Medical: None     Non-medical: None   Tobacco Use   ? Smoking status: Former Smoker     Packs/day: 0.25     Years: 0.50     Pack years: 0.12     Last attempt to quit: 2019     Years since quittin.8   ? Smokeless tobacco: Former User     Quit date: 2019   ? Tobacco comment: not a smoker   Substance and Sexual Activity   ? Alcohol use: Yes     Alcohol/week: 107.0 standard drinks     Types: 7 Glasses of wine, 100 Shots of liquor per week     Comment: drinks mostly liqour.   ? Drug use: Never     Comment:  denies   ? Sexual activity: Not Currently     Birth control/protection: I.U.D.     Comment: Both patners   Lifestyle   ? Physical activity     Days per week: None     Minutes per session: None   ? Stress: None   Relationships   ? Social connections     Talks on phone: None     Gets together: None     Attends Shinto service: None     Active member of club or organization: None     Attends meetings of clubs or organizations: None     Relationship status: None   ? Intimate partner violence     Fear of current or ex partner: None     Emotionally abused: None     Physically abused: None     Forced sexual activity: None   Other Topics Concern   ? None   Social History Narrative    Currently in residential house , might go to grandparents house      born in [facility] , living in [place] , moved to [place] the day she went ot [facility] . Has a .          Objective:       /75 (Patient Site: Left Arm, Patient Position: Sitting, Cuff Size: Adult Large)   Pulse 71   Temp 98.3  F (36.8  C) (Oral)   Resp 14   Wt (!) 227 lb (103 kg)   SpO2 98%   BMI 41.52 kg/m    General appearance: alert, appears stated age, cooperative, no distress and non-toxic  Head: Normocephalic, without obvious abnormality, atraumatic, sinuses nontender to percussion  Eyes: Left: Conjunctivae/sclera clear, no purulent drainage seen, a small 3 mm region of erythematous swelling on the medial aspect of the lower eyelid with spreading of the swelling involving the entire lower eyelid.  Right: Normal. PERRL. EOM intact.  Nose: no discharge  Skin: Tenderness to palpation involving the left lower eyelid with mild to moderate swelling, no significant erythema, no increased warmth to touch

## 2021-07-14 PROBLEM — J45.901 ACUTE ASTHMA EXACERBATION: Status: RESOLVED | Noted: 2020-07-08 | Resolved: 2020-11-24

## 2021-07-14 PROBLEM — J45.901 ASTHMA WITH EXACERBATION: Status: RESOLVED | Noted: 2020-07-09 | Resolved: 2020-11-24

## 2021-08-03 PROBLEM — F10.20 ALCOHOL DEPENDENCE (H): Status: RESOLVED | Noted: 2020-11-24 | Resolved: 2020-11-24

## 2021-08-03 PROBLEM — F39 MOOD DISORDER (H): Status: RESOLVED | Noted: 2019-08-05 | Resolved: 2020-11-24

## 2021-09-14 ENCOUNTER — OFFICE VISIT (OUTPATIENT)
Dept: FAMILY MEDICINE | Facility: CLINIC | Age: 26
End: 2021-09-14
Payer: COMMERCIAL

## 2021-09-14 VITALS
RESPIRATION RATE: 16 BRPM | DIASTOLIC BLOOD PRESSURE: 60 MMHG | BODY MASS INDEX: 40.02 KG/M2 | SYSTOLIC BLOOD PRESSURE: 106 MMHG | TEMPERATURE: 98.4 F | HEIGHT: 64 IN | WEIGHT: 234.44 LBS | HEART RATE: 76 BPM

## 2021-09-14 DIAGNOSIS — M76.891 RIGHT HIP TENDONITIS: ICD-10-CM

## 2021-09-14 DIAGNOSIS — L73.9 FOLLICULITIS: ICD-10-CM

## 2021-09-14 DIAGNOSIS — N92.1 PROLONGED MENSTRUATION: ICD-10-CM

## 2021-09-14 DIAGNOSIS — J45.41 MODERATE PERSISTENT ASTHMA WITH EXACERBATION: Primary | ICD-10-CM

## 2021-09-14 PROBLEM — J30.9 ALLERGIC RHINITIS: Status: ACTIVE | Noted: 2018-08-07

## 2021-09-14 PROBLEM — J38.3 VOCAL CORD DYSFUNCTION: Status: ACTIVE | Noted: 2018-08-07

## 2021-09-14 LAB
ERYTHROCYTE [DISTWIDTH] IN BLOOD BY AUTOMATED COUNT: 13.1 % (ref 10–15)
HCT VFR BLD AUTO: 38.8 % (ref 35–47)
HGB BLD-MCNC: 13.2 G/DL (ref 11.7–15.7)
MCH RBC QN AUTO: 27.5 PG (ref 26.5–33)
MCHC RBC AUTO-ENTMCNC: 34 G/DL (ref 31.5–36.5)
MCV RBC AUTO: 81 FL (ref 78–100)
PLATELET # BLD AUTO: 431 10E3/UL (ref 150–450)
RBC # BLD AUTO: 4.8 10E6/UL (ref 3.8–5.2)
WBC # BLD AUTO: 8.4 10E3/UL (ref 4–11)

## 2021-09-14 PROCEDURE — 85027 COMPLETE CBC AUTOMATED: CPT | Performed by: FAMILY MEDICINE

## 2021-09-14 PROCEDURE — 36415 COLL VENOUS BLD VENIPUNCTURE: CPT | Performed by: FAMILY MEDICINE

## 2021-09-14 PROCEDURE — 99214 OFFICE O/P EST MOD 30 MIN: CPT | Performed by: FAMILY MEDICINE

## 2021-09-14 RX ORDER — MONTELUKAST SODIUM 10 MG/1
10 TABLET ORAL AT BEDTIME
Qty: 90 TABLET | Refills: 3 | Status: SHIPPED | OUTPATIENT
Start: 2021-09-14 | End: 2022-11-29

## 2021-09-14 RX ORDER — CEPHALEXIN 500 MG/1
500 CAPSULE ORAL 3 TIMES DAILY
Qty: 30 CAPSULE | Refills: 0 | Status: SHIPPED | OUTPATIENT
Start: 2021-09-14 | End: 2021-09-24

## 2021-09-14 RX ORDER — ALBUTEROL SULFATE 90 UG/1
2 AEROSOL, METERED RESPIRATORY (INHALATION) EVERY 6 HOURS PRN
Qty: 8 G | Refills: 3 | Status: SHIPPED | OUTPATIENT
Start: 2021-09-14 | End: 2024-04-01

## 2021-09-14 RX ORDER — ALBUTEROL SULFATE 0.83 MG/ML
2.5 SOLUTION RESPIRATORY (INHALATION) EVERY 6 HOURS PRN
Qty: 60 ML | Refills: 3 | Status: SHIPPED | OUTPATIENT
Start: 2021-09-14 | End: 2024-04-01

## 2021-09-14 ASSESSMENT — ASTHMA QUESTIONNAIRES
QUESTION_1 LAST FOUR WEEKS HOW MUCH OF THE TIME DID YOUR ASTHMA KEEP YOU FROM GETTING AS MUCH DONE AT WORK, SCHOOL OR AT HOME: A LITTLE OF THE TIME
ACT_TOTALSCORE: 17
ACUTE_EXACERBATION_TODAY: YES
QUESTION_5 LAST FOUR WEEKS HOW WOULD YOU RATE YOUR ASTHMA CONTROL: SOMEWHAT CONTROLLED
QUESTION_2 LAST FOUR WEEKS HOW OFTEN HAVE YOU HAD SHORTNESS OF BREATH: THREE TO SIX TIMES A WEEK
QUESTION_4 LAST FOUR WEEKS HOW OFTEN HAVE YOU USED YOUR RESCUE INHALER OR NEBULIZER MEDICATION (SUCH AS ALBUTEROL): TWO OR THREE TIMES PER WEEK
QUESTION_3 LAST FOUR WEEKS HOW OFTEN DID YOUR ASTHMA SYMPTOMS (WHEEZING, COUGHING, SHORTNESS OF BREATH, CHEST TIGHTNESS OR PAIN) WAKE YOU UP AT NIGHT OR EARLIER THAN USUAL IN THE MORNING: ONCE OR TWICE

## 2021-09-14 ASSESSMENT — ANXIETY QUESTIONNAIRES
6. BECOMING EASILY ANNOYED OR IRRITABLE: SEVERAL DAYS
8. IF YOU CHECKED OFF ANY PROBLEMS, HOW DIFFICULT HAVE THESE MADE IT FOR YOU TO DO YOUR WORK, TAKE CARE OF THINGS AT HOME, OR GET ALONG WITH OTHER PEOPLE?: SOMEWHAT DIFFICULT
2. NOT BEING ABLE TO STOP OR CONTROL WORRYING: MORE THAN HALF THE DAYS
GAD7 TOTAL SCORE: 10
7. FEELING AFRAID AS IF SOMETHING AWFUL MIGHT HAPPEN: MORE THAN HALF THE DAYS
7. FEELING AFRAID AS IF SOMETHING AWFUL MIGHT HAPPEN: MORE THAN HALF THE DAYS
1. FEELING NERVOUS, ANXIOUS, OR ON EDGE: MORE THAN HALF THE DAYS
4. TROUBLE RELAXING: SEVERAL DAYS
3. WORRYING TOO MUCH ABOUT DIFFERENT THINGS: SEVERAL DAYS
GAD7 TOTAL SCORE: 10
5. BEING SO RESTLESS THAT IT IS HARD TO SIT STILL: SEVERAL DAYS
GAD7 TOTAL SCORE: 10

## 2021-09-14 ASSESSMENT — PATIENT HEALTH QUESTIONNAIRE - PHQ9
SUM OF ALL RESPONSES TO PHQ QUESTIONS 1-9: 10
10. IF YOU CHECKED OFF ANY PROBLEMS, HOW DIFFICULT HAVE THESE PROBLEMS MADE IT FOR YOU TO DO YOUR WORK, TAKE CARE OF THINGS AT HOME, OR GET ALONG WITH OTHER PEOPLE: SOMEWHAT DIFFICULT
SUM OF ALL RESPONSES TO PHQ QUESTIONS 1-9: 10

## 2021-09-14 ASSESSMENT — MIFFLIN-ST. JEOR: SCORE: 1798.4

## 2021-09-14 NOTE — ASSESSMENT & PLAN NOTE
Physical therapy recommended. Patient is agreeable and order was placed. Home exercises also given. Continue use of over the counter medications.

## 2021-09-14 NOTE — PROGRESS NOTES
Assessment/Plan:      Problem List Items Addressed This Visit        Medium    Moderate persistent asthma with exacerbation - Primary     Not under ideal control. Start singular 10 mg daily. Continue albuterol as needed.         Relevant Medications    montelukast (SINGULAIR) 10 MG tablet    albuterol (PROAIR HFA/PROVENTIL HFA/VENTOLIN HFA) 108 (90 Base) MCG/ACT inhaler    albuterol (PROVENTIL) (2.5 MG/3ML) 0.083% neb solution    Prolonged menstruation     Probable anovulation. CBC is normal. Ultrasound ordered for evaluation. Discussed options of IUD or oral contraceptives. She doesn't need birth control as she has female partners but discussed these could be helpful with bleeding. She prefers to hold off on any treatment at this time.         Relevant Orders    CBC with platelets (Completed)    US Pelvic Complete with Transvaginal    Right hip tendonitis     Physical therapy recommended. Patient is agreeable and order was placed. Home exercises also given. Continue use of over the counter medications.         Relevant Orders    Physical Therapy Referral    Folliculitis     Bilateral inner thighs. Keflex for 10 days. Wash with antibiotic soap before and after shaving.         Relevant Medications    cephALEXin (KEFLEX) 500 MG capsule        Patient Instructions   1. Start singulair daily.  2. Continue albuterol as needed.  3. Keflex 500 mg three times daily for 10 days.  4. Wash with antibacterial soap before and after shaving.  5. Change your razor frequently.  6. Heat followed by stretching exercises for the hip twice daily.  7. You should get a call to schedule physical therapy for the hip.  8. Continue over the counter pain medication as needed.  9. Please schedule pelvic ultrasound [phone].  10. If frequent heavy bleeding is persistent we could consider oral contraceptives, depo provera, or and IUD.       Subjective:   Fabian Springer is a 26 year old person who presents today with concerns about a long  "period. It ended about 3 days ago. She had bleeding for one month and reports it was heavy prior to that. She had her IUD removed in February. She reports a history of regular periods prior to placement of her initial IUD. She reports she had been having regular menses prior to this one. No other unusual bleeding or bruising.    We also discussed her asthma. She is having some persistent symptoms.     She is also complaining of right sided hip pain. This has been ongoing for some time. She did have a recent injury to the left foot with a fracture and was in a boot for 6 weeks. She has tried over the counter medications but they are not very helpful.    Patient Active Problem List   Diagnosis     Major depressive disorder, recurrent, severe without psychotic behavior (H)     Posttraumatic stress disorder     Moderate persistent asthma with exacerbation     Vocal cord dysfunction     Generalized anxiety disorder     BMI 40.0-44.9, adult (H)     Dandruff     Allergic rhinitis     Prolonged menstruation     Right hip tendonitis     Folliculitis      Past Medical History:   Diagnosis Date     Anxiety      Anxiety      Asthma      Depression      Depressive disorder      PTSD (post-traumatic stress disorder)      Past Surgical History:   Procedure Laterality Date     EYE SURGERY  08/1998    tear duct       Review of System: Relevant items noted in HPI. ROS otherwise negative.     Objective:     Vitals:    09/14/21 1503   BP: 106/60   BP Location: Left arm   Patient Position: Sitting   Cuff Size: Adult Large   Pulse: 76   Resp: 16   Temp: 98.4  F (36.9  C)   TempSrc: Oral   Weight: 106.3 kg (234 lb 7 oz)   Height: 1.626 m (5' 4\")        Physical Exam  Constitutional:       General: She is not in acute distress.     Appearance: She is not ill-appearing.   Abdominal:      General: Abdomen is flat. There is no distension.      Tenderness: There is no abdominal tenderness.   Genitourinary:     Cervix: Normal.      Uterus: " Normal.       Adnexa: Right adnexa normal and left adnexa normal.   Musculoskeletal:      Right hip: Tenderness (lateral hip pain with internal and external rotation) present. Normal range of motion.      Left hip: Normal. No tenderness or bony tenderness. Normal range of motion.   Skin:     Comments: Multiple papules and pustules inner thighs with mild erythema.         Results for orders placed or performed in visit on 09/14/21   CBC with platelets     Status: Normal   Result Value Ref Range    WBC Count 8.4 4.0 - 11.0 10e3/uL    RBC Count 4.80 3.80 - 5.20 10e6/uL    Hemoglobin 13.2 11.7 - 15.7 g/dL    Hematocrit 38.8 35.0 - 47.0 %    MCV 81 78 - 100 fL    MCH 27.5 26.5 - 33.0 pg    MCHC 34.0 31.5 - 36.5 g/dL    RDW 13.1 10.0 - 15.0 %    Platelet Count 431 150 - 450 10e3/uL

## 2021-09-14 NOTE — ASSESSMENT & PLAN NOTE
Probable anovulation. CBC is normal. Ultrasound ordered for evaluation. Discussed options of IUD or oral contraceptives. She doesn't need birth control as she has female partners but discussed these could be helpful with bleeding. She prefers to hold off on any treatment at this time.

## 2021-09-14 NOTE — PATIENT INSTRUCTIONS
1. Start singulair daily.  2. Continue albuterol as needed.  3. Keflex 500 mg three times daily for 10 days.  4. Wash with antibacterial soap before and after shaving.  5. Change your razor frequently.  6. Heat followed by stretching exercises for the hip twice daily.  7. You should get a call to schedule physical therapy for the hip.  8. Continue over the counter pain medication as needed.  9. Please schedule pelvic ultrasound [phone].  10. If frequent heavy bleeding is persistent we could consider oral contraceptives, depo provera, or and IUD.

## 2021-09-15 ASSESSMENT — ASTHMA QUESTIONNAIRES: ACT_TOTALSCORE: 17

## 2021-09-15 ASSESSMENT — PATIENT HEALTH QUESTIONNAIRE - PHQ9: SUM OF ALL RESPONSES TO PHQ QUESTIONS 1-9: 10

## 2021-09-15 ASSESSMENT — ANXIETY QUESTIONNAIRES: GAD7 TOTAL SCORE: 10

## 2021-09-19 ENCOUNTER — HEALTH MAINTENANCE LETTER (OUTPATIENT)
Age: 26
End: 2021-09-19

## 2021-09-20 ENCOUNTER — HOSPITAL ENCOUNTER (OUTPATIENT)
Dept: ULTRASOUND IMAGING | Facility: CLINIC | Age: 26
Discharge: HOME | End: 2021-09-20
Attending: FAMILY MEDICINE | Admitting: FAMILY MEDICINE
Payer: COMMERCIAL

## 2021-09-20 DIAGNOSIS — N92.1 PROLONGED MENSTRUATION: ICD-10-CM

## 2021-09-20 PROBLEM — E28.2 PCOS (POLYCYSTIC OVARIAN SYNDROME): Status: ACTIVE | Noted: 2021-09-20

## 2021-09-20 PROCEDURE — 76830 TRANSVAGINAL US NON-OB: CPT

## 2021-09-24 ENCOUNTER — TELEPHONE (OUTPATIENT)
Dept: FAMILY MEDICINE | Facility: CLINIC | Age: 26
End: 2021-09-24

## 2021-09-24 ENCOUNTER — OFFICE VISIT (OUTPATIENT)
Dept: FAMILY MEDICINE | Facility: CLINIC | Age: 26
End: 2021-09-24
Payer: COMMERCIAL

## 2021-09-24 VITALS
TEMPERATURE: 97.9 F | RESPIRATION RATE: 16 BRPM | BODY MASS INDEX: 40.17 KG/M2 | HEART RATE: 72 BPM | DIASTOLIC BLOOD PRESSURE: 70 MMHG | SYSTOLIC BLOOD PRESSURE: 110 MMHG | HEIGHT: 64 IN | WEIGHT: 235.31 LBS

## 2021-09-24 DIAGNOSIS — Z11.1 SCREENING EXAMINATION FOR PULMONARY TUBERCULOSIS: ICD-10-CM

## 2021-09-24 DIAGNOSIS — E28.2 PCOS (POLYCYSTIC OVARIAN SYNDROME): Primary | ICD-10-CM

## 2021-09-24 DIAGNOSIS — Z01.84 IMMUNITY STATUS TESTING: ICD-10-CM

## 2021-09-24 DIAGNOSIS — N92.1 PROLONGED MENSTRUATION: ICD-10-CM

## 2021-09-24 DIAGNOSIS — L21.0 DANDRUFF: ICD-10-CM

## 2021-09-24 LAB
ANION GAP SERPL CALCULATED.3IONS-SCNC: 12 MMOL/L (ref 5–18)
BUN SERPL-MCNC: 10 MG/DL (ref 8–22)
CALCIUM SERPL-MCNC: 10.3 MG/DL (ref 8.5–10.5)
CHLORIDE BLD-SCNC: 106 MMOL/L (ref 98–107)
CHOLEST SERPL-MCNC: 176 MG/DL
CO2 SERPL-SCNC: 23 MMOL/L (ref 22–31)
CREAT SERPL-MCNC: 0.85 MG/DL (ref 0.6–1.1)
FASTING STATUS PATIENT QL REPORTED: YES
GFR SERPL CREATININE-BSD FRML MDRD: >90 ML/MIN/1.73M2
GLUCOSE BLD-MCNC: 84 MG/DL (ref 70–125)
HBA1C MFR BLD: 5.2 %
HDLC SERPL-MCNC: 52 MG/DL
LDLC SERPL CALC-MCNC: 108 MG/DL
POTASSIUM BLD-SCNC: 4.1 MMOL/L (ref 3.5–5)
SODIUM SERPL-SCNC: 141 MMOL/L (ref 136–145)
TRIGL SERPL-MCNC: 82 MG/DL

## 2021-09-24 PROCEDURE — 83036 HEMOGLOBIN GLYCOSYLATED A1C: CPT | Performed by: FAMILY MEDICINE

## 2021-09-24 PROCEDURE — 80061 LIPID PANEL: CPT | Performed by: FAMILY MEDICINE

## 2021-09-24 PROCEDURE — 86481 TB AG RESPONSE T-CELL SUSP: CPT | Performed by: FAMILY MEDICINE

## 2021-09-24 PROCEDURE — 90471 IMMUNIZATION ADMIN: CPT | Performed by: FAMILY MEDICINE

## 2021-09-24 PROCEDURE — 36415 COLL VENOUS BLD VENIPUNCTURE: CPT | Performed by: FAMILY MEDICINE

## 2021-09-24 PROCEDURE — 80048 BASIC METABOLIC PNL TOTAL CA: CPT | Performed by: FAMILY MEDICINE

## 2021-09-24 PROCEDURE — 99214 OFFICE O/P EST MOD 30 MIN: CPT | Mod: 25 | Performed by: FAMILY MEDICINE

## 2021-09-24 PROCEDURE — 86787 VARICELLA-ZOSTER ANTIBODY: CPT | Performed by: FAMILY MEDICINE

## 2021-09-24 PROCEDURE — 90686 IIV4 VACC NO PRSV 0.5 ML IM: CPT | Performed by: FAMILY MEDICINE

## 2021-09-24 RX ORDER — KETOCONAZOLE 20 MG/ML
SHAMPOO TOPICAL DAILY PRN
Qty: 120 ML | Refills: 11 | Status: SHIPPED | OUTPATIENT
Start: 2021-09-24 | End: 2023-01-03

## 2021-09-24 RX ORDER — NORETHINDRONE ACETATE AND ETHINYL ESTRADIOL 1MG-20(21)
1 KIT ORAL DAILY
Qty: 84 TABLET | Refills: 3 | Status: SHIPPED | OUTPATIENT
Start: 2021-09-24 | End: 2023-01-03

## 2021-09-24 RX ORDER — SEMAGLUTIDE 1.34 MG/ML
0.25 INJECTION, SOLUTION SUBCUTANEOUS
Qty: 2 MG | Refills: 0 | Status: SHIPPED | OUTPATIENT
Start: 2021-09-24 | End: 2021-09-28

## 2021-09-24 ASSESSMENT — ASTHMA QUESTIONNAIRES
QUESTION_1 LAST FOUR WEEKS HOW MUCH OF THE TIME DID YOUR ASTHMA KEEP YOU FROM GETTING AS MUCH DONE AT WORK, SCHOOL OR AT HOME: A LITTLE OF THE TIME
QUESTION_2 LAST FOUR WEEKS HOW OFTEN HAVE YOU HAD SHORTNESS OF BREATH: ONCE OR TWICE A WEEK
QUESTION_4 LAST FOUR WEEKS HOW OFTEN HAVE YOU USED YOUR RESCUE INHALER OR NEBULIZER MEDICATION (SUCH AS ALBUTEROL): NOT AT ALL
QUESTION_5 LAST FOUR WEEKS HOW WOULD YOU RATE YOUR ASTHMA CONTROL: WELL CONTROLLED
QUESTION_3 LAST FOUR WEEKS HOW OFTEN DID YOUR ASTHMA SYMPTOMS (WHEEZING, COUGHING, SHORTNESS OF BREATH, CHEST TIGHTNESS OR PAIN) WAKE YOU UP AT NIGHT OR EARLIER THAN USUAL IN THE MORNING: ONCE OR TWICE
ACT_TOTALSCORE: 21

## 2021-09-24 ASSESSMENT — PATIENT HEALTH QUESTIONNAIRE - PHQ9
SUM OF ALL RESPONSES TO PHQ QUESTIONS 1-9: 8
10. IF YOU CHECKED OFF ANY PROBLEMS, HOW DIFFICULT HAVE THESE PROBLEMS MADE IT FOR YOU TO DO YOUR WORK, TAKE CARE OF THINGS AT HOME, OR GET ALONG WITH OTHER PEOPLE: SOMEWHAT DIFFICULT
SUM OF ALL RESPONSES TO PHQ QUESTIONS 1-9: 8

## 2021-09-24 ASSESSMENT — MIFFLIN-ST. JEOR: SCORE: 1802.37

## 2021-09-24 NOTE — TELEPHONE ENCOUNTER
Central Prior Authorization Team   Phone: [phone]    PA Initiation    Medication: semaglutide (OZEMPIC, 0.25 OR 0.5 MG/DOSE,) 2 MG/1.5ML SOPN [facility]   Insurance Company: [employer] [place] - Phone [phone] Fax [phone]  Pharmacy Filling the Rx: [facility] [address] [place] [address] [address]  Filling Pharmacy Phone: [phone]  Filling Pharmacy Fax:    Start Date: 9/24/2021

## 2021-09-24 NOTE — PATIENT INSTRUCTIONS
1. Start Ozempic 0.25 mg injection once a week. This will require prior approval. We will let you know when completed.  2. Start birth control pills as prescribed.  3. We will notify you of lab results.  4. Follow up for recheck in one month.

## 2021-09-24 NOTE — ASSESSMENT & PLAN NOTE
Discussed pathophysiology and treatment options. We will start OCP for managing her heavy frequent menses. Also discussed starting semaglutide or metformin for insulin resistance and BMI 40. Patient would like to start semaglutide and rx was sent. This will likely need prior authorization. If not approved we discussed starting metformin. Recheck in one month.

## 2021-09-24 NOTE — PROGRESS NOTES
Assessment/Plan:      Problem List Items Addressed This Visit        Medium    BMI 40.0-44.9, adult (H)    Relevant Medications    semaglutide (OZEMPIC, 0.25 OR 0.5 MG/DOSE,) 2 MG/1.5ML SOPN pen    Dandruff     Well controlled.         Relevant Medications    ketoconazole (NIZORAL) 2 % external shampoo    Prolonged menstruation    Relevant Medications    norethindrone-ethinyl estradiol (JUNEL FE 1/20) 1-20 MG-MCG tablet    PCOS (polycystic ovarian syndrome) - Primary     Discussed pathophysiology and treatment options. We will start OCP for managing her heavy frequent menses. Also discussed starting semaglutide or metformin for insulin resistance and BMI 40. Patient would like to start semaglutide and rx was sent. This will likely need prior authorization. If not approved we discussed starting metformin. Recheck in one month.         Relevant Medications    norethindrone-ethinyl estradiol (JUNEL FE 1/20) 1-20 MG-MCG tablet    semaglutide (OZEMPIC, 0.25 OR 0.5 MG/DOSE,) 2 MG/1.5ML SOPN pen    Other Relevant Orders    Hemoglobin A1c    Lipid Profile    Basic metabolic panel      Other Visit Diagnoses     Screening examination for pulmonary tuberculosis        Relevant Orders    Quantiferon-TB Gold Plus    Immunity status testing        Patient only has one documented varicella vaccine. Varicella titre drawn today.    Relevant Orders    Varicella Zoster Virus Antibody IgG        Patient Instructions   1. Start Ozempic 0.25 mg injection once a week. This will require prior approval. We will let you know when completed.  2. Start birth control pills as prescribed.  3. We will notify you of lab results.  4. Follow up for recheck in one month.         Subjective:   Fabian Springer is a 26 year old person who presents today for follow up on her recent ultrasound that indicated probable PCOS. Her ultrasound was otherwise normal. The ultrasound was done to evaluate an ongoing issue with irregular and heavy menses.     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    The patient is also requesting a refill of her ketoconazole shampoo. That is working well.     Patient Active Problem List   Diagnosis     Major depressive disorder, recurrent, severe without psychotic behavior (H)     Posttraumatic stress disorder     Moderate persistent asthma with exacerbation     Vocal cord dysfunction     Generalized anxiety disorder     BMI 40.0-44.9, adult (H)     Dandruff     Allergic rhinitis     Prolonged menstruation     Right hip tendonitis     Folliculitis     PCOS (polycystic ovarian syndrome)      Past Medical History:   Diagnosis Date     Anxiety      Anxiety      Asthma      Depression      Depressive disorder      PTSD (post-traumatic stress disorder)      Past Surgical History:   Procedure Laterality Date     EYE SURGERY  08/1998    tear duct       Review of System: Relevant items noted in HPI. ROS otherwise negative.     Objective:     Vitals:    09/24/21 0819   BP: 110/70   BP Location: Left arm   Patient Position: Sitting   Cuff Size: Adult Large   Pulse: 72   Resp: 16   Temp: 97.9  F (36.6  C)   TempSrc: Oral   Weight: 106.7 kg (235 lb 5 oz)   Height: 1.626 m (5' 4\")        Physical Exam  Constitutional:       General: She is not in acute distress.     Appearance: She is not ill-appearing.          "

## 2021-09-25 ASSESSMENT — ASTHMA QUESTIONNAIRES: ACT_TOTALSCORE: 21

## 2021-09-25 ASSESSMENT — PATIENT HEALTH QUESTIONNAIRE - PHQ9: SUM OF ALL RESPONSES TO PHQ QUESTIONS 1-9: 8

## 2021-09-26 LAB
GAMMA INTERFERON BACKGROUND BLD IA-ACNC: 0.21 IU/ML
M TB IFN-G BLD-IMP: NEGATIVE
M TB IFN-G CD4+ BCKGRND COR BLD-ACNC: 9.79 IU/ML
MITOGEN IGNF BCKGRD COR BLD-ACNC: 0.05 IU/ML
MITOGEN IGNF BCKGRD COR BLD-ACNC: 0.06 IU/ML
QUANTIFERON MITOGEN: 10 IU/ML
QUANTIFERON NIL TUBE: 0.21 IU/ML
QUANTIFERON TB1 TUBE: 0.27 IU/ML
QUANTIFERON TB2 TUBE: 0.26

## 2021-09-27 LAB — VZV IGG SER QL IA: NEGATIVE

## 2021-09-28 RX ORDER — METFORMIN HCL 500 MG
TABLET, EXTENDED RELEASE 24 HR ORAL
Qty: 180 TABLET | Refills: 3 | Status: SHIPPED | OUTPATIENT
Start: 2021-09-28 | End: 2021-10-29 | Stop reason: SINTOL

## 2021-09-28 NOTE — TELEPHONE ENCOUNTER
PRIOR AUTHORIZATION DENIED    Medication: semaglutide (OZEMPIC, 0.25 OR 0.5 MG/DOSE,) 2 MG/1.5ML SOPN pen     Denial Date: 9/28/2021    Denial Rational: Medication is only covered if being prescribed for Type 2 Diabetes.  It is not covered for any other diagnosis.          Appeal Information:  If provider would like to appeal please provide a letter of medical necessity and route back to PA team.

## 2021-09-28 NOTE — TELEPHONE ENCOUNTER
Please notify patient that this was denied. I have sent a prescription for metformin to her pharmacy to be started instead.

## 2021-10-06 ENCOUNTER — MYC MEDICAL ADVICE (OUTPATIENT)
Dept: FAMILY MEDICINE | Facility: CLINIC | Age: 26
End: 2021-10-06

## 2021-10-29 ENCOUNTER — TELEPHONE (OUTPATIENT)
Dept: FAMILY MEDICINE | Facility: CLINIC | Age: 26
End: 2021-10-29

## 2021-10-29 ENCOUNTER — OFFICE VISIT (OUTPATIENT)
Dept: FAMILY MEDICINE | Facility: CLINIC | Age: 26
End: 2021-10-29
Payer: COMMERCIAL

## 2021-10-29 VITALS
HEIGHT: 64 IN | RESPIRATION RATE: 16 BRPM | TEMPERATURE: 97.5 F | BODY MASS INDEX: 40 KG/M2 | SYSTOLIC BLOOD PRESSURE: 102 MMHG | DIASTOLIC BLOOD PRESSURE: 70 MMHG | WEIGHT: 234.31 LBS | HEART RATE: 76 BPM

## 2021-10-29 DIAGNOSIS — F33.2 MAJOR DEPRESSIVE DISORDER, RECURRENT, SEVERE WITHOUT PSYCHOTIC BEHAVIOR (H): ICD-10-CM

## 2021-10-29 DIAGNOSIS — J45.41 MODERATE PERSISTENT ASTHMA WITH EXACERBATION: ICD-10-CM

## 2021-10-29 DIAGNOSIS — E28.2 PCOS (POLYCYSTIC OVARIAN SYNDROME): Primary | ICD-10-CM

## 2021-10-29 DIAGNOSIS — N92.1 MENORRHAGIA WITH IRREGULAR CYCLE: ICD-10-CM

## 2021-10-29 PROCEDURE — 99214 OFFICE O/P EST MOD 30 MIN: CPT | Mod: 25 | Performed by: FAMILY MEDICINE

## 2021-10-29 PROCEDURE — 90471 IMMUNIZATION ADMIN: CPT | Performed by: FAMILY MEDICINE

## 2021-10-29 PROCEDURE — 96127 BRIEF EMOTIONAL/BEHAV ASSMT: CPT | Performed by: FAMILY MEDICINE

## 2021-10-29 PROCEDURE — 90716 VAR VACCINE LIVE SUBQ: CPT | Performed by: FAMILY MEDICINE

## 2021-10-29 RX ORDER — MEDROXYPROGESTERONE ACETATE 10 MG
10 TABLET ORAL DAILY
Qty: 5 TABLET | Refills: 0 | Status: SHIPPED | OUTPATIENT
Start: 2021-10-29 | End: 2021-11-03

## 2021-10-29 RX ORDER — SERTRALINE HYDROCHLORIDE 100 MG/1
150 TABLET, FILM COATED ORAL DAILY
Qty: 135 TABLET | Refills: 0 | Status: SHIPPED | OUTPATIENT
Start: 2021-10-29 | End: 2022-12-30 | Stop reason: DRUGHIGH

## 2021-10-29 ASSESSMENT — ANXIETY QUESTIONNAIRES
4. TROUBLE RELAXING: MORE THAN HALF THE DAYS
7. FEELING AFRAID AS IF SOMETHING AWFUL MIGHT HAPPEN: SEVERAL DAYS
8. IF YOU CHECKED OFF ANY PROBLEMS, HOW DIFFICULT HAVE THESE MADE IT FOR YOU TO DO YOUR WORK, TAKE CARE OF THINGS AT HOME, OR GET ALONG WITH OTHER PEOPLE?: SOMEWHAT DIFFICULT
1. FEELING NERVOUS, ANXIOUS, OR ON EDGE: MORE THAN HALF THE DAYS
5. BEING SO RESTLESS THAT IT IS HARD TO SIT STILL: MORE THAN HALF THE DAYS
7. FEELING AFRAID AS IF SOMETHING AWFUL MIGHT HAPPEN: SEVERAL DAYS
GAD7 TOTAL SCORE: 13
GAD7 TOTAL SCORE: 13
2. NOT BEING ABLE TO STOP OR CONTROL WORRYING: MORE THAN HALF THE DAYS
GAD7 TOTAL SCORE: 13
3. WORRYING TOO MUCH ABOUT DIFFERENT THINGS: MORE THAN HALF THE DAYS
6. BECOMING EASILY ANNOYED OR IRRITABLE: MORE THAN HALF THE DAYS

## 2021-10-29 ASSESSMENT — ASTHMA QUESTIONNAIRES
QUESTION_1 LAST FOUR WEEKS HOW MUCH OF THE TIME DID YOUR ASTHMA KEEP YOU FROM GETTING AS MUCH DONE AT WORK, SCHOOL OR AT HOME: A LITTLE OF THE TIME
ACT_TOTALSCORE: 20
QUESTION_3 LAST FOUR WEEKS HOW OFTEN DID YOUR ASTHMA SYMPTOMS (WHEEZING, COUGHING, SHORTNESS OF BREATH, CHEST TIGHTNESS OR PAIN) WAKE YOU UP AT NIGHT OR EARLIER THAN USUAL IN THE MORNING: ONCE OR TWICE
QUESTION_4 LAST FOUR WEEKS HOW OFTEN HAVE YOU USED YOUR RESCUE INHALER OR NEBULIZER MEDICATION (SUCH AS ALBUTEROL): ONCE A WEEK OR LESS
QUESTION_5 LAST FOUR WEEKS HOW WOULD YOU RATE YOUR ASTHMA CONTROL: WELL CONTROLLED
QUESTION_2 LAST FOUR WEEKS HOW OFTEN HAVE YOU HAD SHORTNESS OF BREATH: ONCE OR TWICE A WEEK

## 2021-10-29 ASSESSMENT — PATIENT HEALTH QUESTIONNAIRE - PHQ9
10. IF YOU CHECKED OFF ANY PROBLEMS, HOW DIFFICULT HAVE THESE PROBLEMS MADE IT FOR YOU TO DO YOUR WORK, TAKE CARE OF THINGS AT HOME, OR GET ALONG WITH OTHER PEOPLE: SOMEWHAT DIFFICULT
SUM OF ALL RESPONSES TO PHQ QUESTIONS 1-9: 12
SUM OF ALL RESPONSES TO PHQ QUESTIONS 1-9: 12

## 2021-10-29 ASSESSMENT — MIFFLIN-ST. JEOR: SCORE: 1797.83

## 2021-10-29 NOTE — TELEPHONE ENCOUNTER
PA completed and denied.  See encounter dated 09/24/2021.  Medication is only covered if being prescribed for Type 2 Diabetes.  It is not covered for any other diagnosis.

## 2021-10-29 NOTE — TELEPHONE ENCOUNTER
Prior Authorization Request  Who s requesting:  Provider  Pharmacy Name and Location: [facility]  Medication Name: semaglutide (OZEMPIC) 2 MG/1.5ML SOPN pen  Insurance Plan: unknown  Insurance Member ID Number:  unknown  CoverMyMeds Key: NA  Informed patient that prior authorizations can take up to 10 business days for response:   Yes  Okay to leave a detailed message: Yes       PATIENT HAS NOT TOLERATED METFORMIN.     Route to pool:  [facility]

## 2021-10-29 NOTE — ASSESSMENT & PLAN NOTE
Due to PCOS. This was her first month on OCPs and she is having breakthrough bleeding. Treatment with 5 days of medroxyprogesterone 10 mg daily to arrest current bleed and induce withdrawal bleed. She will then restart OCPs. Follow up if not improving in 3 months.

## 2021-10-29 NOTE — TELEPHONE ENCOUNTER
The denial also stats the patient needs to have a diagnosis of type 2 diabetes. Patient still does not meet ALL required criteria for approval.  This can not be resubmitted it will have to be appealed.  See original telephone encounter for additional information on what is needed to start appeal.     ]

## 2021-10-29 NOTE — TELEPHONE ENCOUNTER
I am aware of the previous denial. That denial stated that patient needed to try and fail metformin. She has now done that. Please resubmit prior auth with that information.

## 2021-10-29 NOTE — ASSESSMENT & PLAN NOTE
Patient is following with psychiatry but is unable to be seen due to unpaid bills. She was encouraged to follow up with psychiatry. Refill of sertraline sent to pharmacy.

## 2021-10-29 NOTE — PATIENT INSTRUCTIONS
1. Stop birth control pills.  2. Take medroxyprogesterone daily for 5 days. After you finish this you will get a withdrawal bleed.  3. 7 days after finishing the medroxyprogesterone, start a new pill pack of your birth control pills.  4. If bleeding not improving in 3 months, please make follow up appointment.  5. Stop metformin. New prescription submitted for semaglutide.  6. Continue current dosing of sertraline. Follow up with psychiatry recommended.

## 2021-10-30 ASSESSMENT — PATIENT HEALTH QUESTIONNAIRE - PHQ9: SUM OF ALL RESPONSES TO PHQ QUESTIONS 1-9: 12

## 2021-10-30 ASSESSMENT — ASTHMA QUESTIONNAIRES: ACT_TOTALSCORE: 20

## 2021-10-30 ASSESSMENT — ANXIETY QUESTIONNAIRES: GAD7 TOTAL SCORE: 13

## 2021-12-07 ENCOUNTER — TRANSFERRED RECORDS (OUTPATIENT)
Dept: HEALTH INFORMATION MANAGEMENT | Facility: CLINIC | Age: 26
End: 2021-12-07
Payer: COMMERCIAL

## 2021-12-17 ENCOUNTER — TRANSFERRED RECORDS (OUTPATIENT)
Dept: HEALTH INFORMATION MANAGEMENT | Facility: CLINIC | Age: 26
End: 2021-12-17
Payer: COMMERCIAL

## 2021-12-30 ENCOUNTER — TRANSFERRED RECORDS (OUTPATIENT)
Dept: HEALTH INFORMATION MANAGEMENT | Facility: CLINIC | Age: 26
End: 2021-12-30
Payer: COMMERCIAL

## 2022-01-09 ENCOUNTER — HEALTH MAINTENANCE LETTER (OUTPATIENT)
Age: 27
End: 2022-01-09

## 2022-02-02 ENCOUNTER — TRANSFERRED RECORDS (OUTPATIENT)
Dept: HEALTH INFORMATION MANAGEMENT | Facility: CLINIC | Age: 27
End: 2022-02-02
Payer: COMMERCIAL

## 2022-11-21 ENCOUNTER — HEALTH MAINTENANCE LETTER (OUTPATIENT)
Age: 27
End: 2022-11-21

## 2022-11-29 ENCOUNTER — OFFICE VISIT (OUTPATIENT)
Dept: FAMILY MEDICINE | Facility: CLINIC | Age: 27
End: 2022-11-29
Payer: COMMERCIAL

## 2022-11-29 VITALS
BODY MASS INDEX: 39.15 KG/M2 | RESPIRATION RATE: 16 BRPM | SYSTOLIC BLOOD PRESSURE: 110 MMHG | OXYGEN SATURATION: 98 % | WEIGHT: 235 LBS | HEART RATE: 91 BPM | HEIGHT: 65 IN | DIASTOLIC BLOOD PRESSURE: 70 MMHG

## 2022-11-29 DIAGNOSIS — M25.571 PAIN IN JOINT INVOLVING ANKLE AND FOOT, RIGHT: Primary | ICD-10-CM

## 2022-11-29 DIAGNOSIS — M67.40 GANGLION CYST: ICD-10-CM

## 2022-11-29 DIAGNOSIS — Z01.818 PREOP GENERAL PHYSICAL EXAM: ICD-10-CM

## 2022-11-29 DIAGNOSIS — E66.09 CLASS 2 OBESITY DUE TO EXCESS CALORIES WITHOUT SERIOUS COMORBIDITY WITH BODY MASS INDEX (BMI) OF 39.0 TO 39.9 IN ADULT: ICD-10-CM

## 2022-11-29 DIAGNOSIS — E66.812 CLASS 2 OBESITY DUE TO EXCESS CALORIES WITHOUT SERIOUS COMORBIDITY WITH BODY MASS INDEX (BMI) OF 39.0 TO 39.9 IN ADULT: ICD-10-CM

## 2022-11-29 DIAGNOSIS — F33.2 MAJOR DEPRESSIVE DISORDER, RECURRENT, SEVERE WITHOUT PSYCHOTIC BEHAVIOR (H): ICD-10-CM

## 2022-11-29 LAB — HCG UR QL: NEGATIVE

## 2022-11-29 PROCEDURE — 81025 URINE PREGNANCY TEST: CPT | Performed by: FAMILY MEDICINE

## 2022-11-29 PROCEDURE — 99214 OFFICE O/P EST MOD 30 MIN: CPT | Performed by: FAMILY MEDICINE

## 2022-11-29 PROCEDURE — 93000 ELECTROCARDIOGRAM COMPLETE: CPT | Performed by: FAMILY MEDICINE

## 2022-11-29 ASSESSMENT — PATIENT HEALTH QUESTIONNAIRE - PHQ9
SUM OF ALL RESPONSES TO PHQ QUESTIONS 1-9: 9
10. IF YOU CHECKED OFF ANY PROBLEMS, HOW DIFFICULT HAVE THESE PROBLEMS MADE IT FOR YOU TO DO YOUR WORK, TAKE CARE OF THINGS AT HOME, OR GET ALONG WITH OTHER PEOPLE: SOMEWHAT DIFFICULT
SUM OF ALL RESPONSES TO PHQ QUESTIONS 1-9: 9

## 2022-11-29 ASSESSMENT — ASTHMA QUESTIONNAIRES
ACT_TOTALSCORE: 22
QUESTION_4 LAST FOUR WEEKS HOW OFTEN HAVE YOU USED YOUR RESCUE INHALER OR NEBULIZER MEDICATION (SUCH AS ALBUTEROL): ONCE A WEEK OR LESS
QUESTION_1 LAST FOUR WEEKS HOW MUCH OF THE TIME DID YOUR ASTHMA KEEP YOU FROM GETTING AS MUCH DONE AT WORK, SCHOOL OR AT HOME: NONE OF THE TIME
ACT_TOTALSCORE: 22
QUESTION_5 LAST FOUR WEEKS HOW WOULD YOU RATE YOUR ASTHMA CONTROL: WELL CONTROLLED
QUESTION_2 LAST FOUR WEEKS HOW OFTEN HAVE YOU HAD SHORTNESS OF BREATH: ONCE OR TWICE A WEEK
QUESTION_3 LAST FOUR WEEKS HOW OFTEN DID YOUR ASTHMA SYMPTOMS (WHEEZING, COUGHING, SHORTNESS OF BREATH, CHEST TIGHTNESS OR PAIN) WAKE YOU UP AT NIGHT OR EARLIER THAN USUAL IN THE MORNING: NOT AT ALL

## 2022-11-29 NOTE — PROGRESS NOTES
[address] [address]  Phone: [phone]  Fax: [phone]  Primary Provider: Cris Vaughn  Pre-op Performing Provider: DAQUAN FAN      PREOPERATIVE EVALUATION:  Today's date: 11/29/2022    Fabian Springer is a 27 year old female who presents for a preoperative evaluation.    Surgical Information:  Surgery/Procedure: R ankle  Surgery Location: [facility]  Surgeon: Dr. Sánchez  Surgery Date: 11/30/2022  Time of Surgery:   Where patient plans to recover: At home with family  Fax number for surgical facility: [phone]    Type of Anesthesia Anticipated: General and to be determined    Assessment & Plan     The proposed surgical procedure is considered INTERMEDIATE risk.    Problem List Items Addressed This Visit        Behavioral    Major depressive disorder, recurrent, severe without psychotic behavior (H)   Other Visit Diagnoses     Pain in joint involving ankle and foot, right    -  Primary    Relevant Orders    HCG qualitative urine (Completed)    EKG 12-lead complete w/read - Clinics (Completed)    Preop general physical exam        Relevant Orders    HCG qualitative urine (Completed)    EKG 12-lead complete w/read - Clinics (Completed)    Ganglion cyst        Relevant Orders    HCG qualitative urine (Completed)    EKG 12-lead complete w/read - Clinics (Completed)    Class 2 obesity due to excess calories without serious comorbidity with body mass index (BMI) of 39.0 to 39.9 in adult        Relevant Orders    Med Therapy Management Referral        Patient is having surgery tomorrow.  According to the notes that she has brought with her from [place] they would like her to have a EKG and a pregnancy test.  Pregnancy test is negative and EKG shows normal sinus rhythm.    She struggles with obesity and has been on Ozempic for about a year however it is never been titrated up beyond its starting dose.  Placed an  [facility] pharmacy referral.  If patient is unable to follow-up with [facility] due to insurance reasons she is welcome to follow-up with me.    Patient has major depressive disorder and currently has both a counselor and a psychiatrist and does not feel like she needs me to make any changes for her medications at this time.            RECOMMENDATION:  APPROVAL GIVEN to proceed with proposed procedure, without further diagnostic evaluation.                Subjective     HPI related to upcoming procedure: right ankle surgery    Preop Questions 11/29/2022   1. Have you ever had a heart attack or stroke? No   2. Have you ever had surgery on your heart or blood vessels, such as a stent placement, a coronary artery bypass, or surgery on an artery in your head, neck, heart, or legs? No   3. Do you have chest pain with activity? No   4. Do you have a history of  heart failure? No   5. Do you currently have a cold, bronchitis or symptoms of other infection? No   6. Do you have a cough, shortness of breath, or wheezing? No   7. Do you or anyone in your family have previous history of blood clots? No   8. Do you or does anyone in your family have a serious bleeding problem such as prolonged bleeding following surgeries or cuts? No   9. Have you ever had problems with anemia or been told to take iron pills? YES -   10. Have you had any abnormal blood loss such as black, tarry or bloody stools, or abnormal vaginal bleeding? No   11. Have you ever had a blood transfusion? No   12. Are you willing to have a blood transfusion if it is medically needed before, during, or after your surgery? Yes   13. Have you or any of your relatives ever had problems with anesthesia? No   14. Do you have sleep apnea, excessive snoring or daytime drowsiness? No   15. Do you have any artifical heart valves or other implanted medical devices like a pacemaker, defibrillator, or continuous glucose monitor? No   16. Do you have artificial joints? No   17. Are you  allergic to latex? No   18. Is there any chance that you may be pregnant? No       Health Care Directive:  Patient does not have a Health Care Directive or Living Will: Discussed advance care planning with patient; information given to patient to review.    Preoperative Review of :   reviewed - no record of controlled substances prescribed.          Review of Systems  Neg except as per HPI    Patient Active Problem List    Diagnosis Date Noted     PCOS (polycystic ovarian syndrome) 09/20/2021     Priority: Medium     Confirmed by ultrasound 9/2021.       Menorrhagia with irregular cycle 09/14/2021     Priority: Medium     Right hip tendonitis 09/14/2021     Priority: Medium     Folliculitis 09/14/2021     Priority: Medium     BMI 40.0-44.9, adult (H) 02/09/2021     Priority: Medium     Dandruff 02/09/2021     Priority: Medium     Generalized anxiety disorder 11/24/2020     Priority: Medium     Last Assessment & Plan:   Formatting of this note might be different from the original.  Continue follow up with psychiatry.       Moderate persistent asthma with exacerbation 08/22/2019     Priority: Medium     Major depressive disorder, recurrent, severe without psychotic behavior (H) 08/06/2019     Priority: Medium     Continue follow up with psychiatry.       Posttraumatic stress disorder 08/06/2019     Priority: Medium     Vocal cord dysfunction 08/07/2018     Priority: Medium     Allergic rhinitis 08/07/2018     Priority: Medium      Past Medical History:   Diagnosis Date     Anxiety      Anxiety      Asthma      Depression      Depressive disorder      PTSD (post-traumatic stress disorder)      Past Surgical History:   Procedure Laterality Date     EYE SURGERY  08/01/1998    tear duct     RELEASE DEQUERVAINS WRIST Right      Current Outpatient Medications   Medication Sig Dispense Refill     albuterol (PROAIR HFA/PROVENTIL HFA/VENTOLIN HFA) 108 (90 Base) MCG/ACT inhaler Inhale 2 puffs into the lungs every 6 hours  "as needed for shortness of breath / dyspnea or wheezing 8 g 3     albuterol (PROVENTIL) (2.5 MG/3ML) 0.083% neb solution Take 1 vial (2.5 mg) by nebulization every 6 hours as needed for shortness of breath / dyspnea or wheezing 60 mL 3     cetirizine (ZYRTEC) 10 MG tablet Take 10 mg by mouth daily as needed for allergies       fluticasone (FLONASE) 50 MCG/ACT nasal spray Spray 1 spray into both nostrils daily       hydrOXYzine (ATARAX) 50 MG tablet Take 50 mg by mouth every 4 hours as needed for anxiety       ibuprofen (ADVIL/MOTRIN) 200 MG capsule Take 600 mg by mouth every 8 hours as needed for pain       ketoconazole (NIZORAL) 2 % external shampoo Apply topically daily as needed for itching or irritation 120 mL 11     norethindrone-ethinyl estradiol (JUNEL FE 1/20) 1-20 MG-MCG tablet Take 1 tablet by mouth daily 84 tablet 3     semaglutide (OZEMPIC) 2 MG/1.5ML SOPN pen Inject 0.25 mg Subcutaneous every 7 days 1.5 mL 3     sertraline (ZOLOFT) 100 MG tablet Take 1.5 tablets (150 mg) by mouth daily 135 tablet 0       Allergies   Allergen Reactions     Tomato Hives     Patient cannot have Raw Tomatoes.  This causes hives in her throat.  But pt does take processed tomatoes like ketch-up, pasta sauce etc.         Social History     Tobacco Use     Smoking status: Never     Smokeless tobacco: Never   Substance Use Topics     Alcohol use: Yes     Alcohol/week: 2.0 standard drinks     Family History   Problem Relation Age of Onset     Lung Cancer Maternal Grandmother      Lung Cancer Maternal Grandfather      Alzheimer Disease Paternal Grandfather      No Known Problems Mother         Patient has no contact with her     No Known Problems Father      Other - See Comments Brother         Lymes Disease     History   Drug Use Unknown     Comment: Drug use: denies         Objective     /70 (BP Location: Right arm, Patient Position: Right side)   Pulse 91   Resp 16   Ht 1.645 m (5' 4.75\")   Wt 106.6 kg (235 lb)   " SpO2 98%   BMI 39.41 kg/m      Physical Exam      General: alert and oriented ×3 no acute distress.    HEENT: Normocephalic and atraumatic.   Eyes pupils are equal round and reactive to light extraocular motion is intact. normal conjunctiva    Hearing is grossly normal and there is no otorrhea.     Chest: has bilateral rise with no increased work of breathing. clear to auscultation without wheezes, rhonchi, or rales.    Cardiovascular: normal perfusion and brisk capillary refill. S1S2 with regular rate and rhythm and no murmurs, gallops or rubs.    Musculoskeletal: no gross focal abnormalities and normal gait.    Neuro: no gross focal abnormalities and memory seems intact. CN 2-12 are grossly intact.    Psychiatric: speech is clear and coherent and fluent. Patient dressed appropriately for the weather. Mood is appropriate and affect is full.          Recent Labs   Lab Test 09/24/21  0914 09/14/21  1534 03/17/21  1305   HGB  --  13.2 13.6   PLT  --  431  --      --   --    POTASSIUM 4.1  --   --    CR 0.85  --   --    A1C 5.2  --   --       Recent Results (from the past 240 hour(s))   HCG qualitative urine    Collection Time: 11/29/22 10:47 AM   Result Value Ref Range    hCG Urine Qualitative Negative Negative         Diagnostics:  Labs pending at this time.  Results will be reviewed when available.   EKG: appears normal, NSR, normal axis, normal intervals, no acute ST/T changes c/w ischemia, no LVH by voltage criteria    Revised Cardiac Risk Index (RCRI):  The patient has the following serious cardiovascular risks for perioperative complications:   - No serious cardiac risks = 0 points     RCRI Interpretation: 0 points: Class I (very low risk - 0.4% complication rate)           Signed Electronically by: Sonia Quinones MD  Copy of this evaluation report is provided to requesting physician.

## 2022-11-30 ENCOUNTER — TRANSFERRED RECORDS (OUTPATIENT)
Dept: HEALTH INFORMATION MANAGEMENT | Facility: CLINIC | Age: 27
End: 2022-11-30

## 2022-12-01 ENCOUNTER — TELEPHONE (OUTPATIENT)
Dept: FAMILY MEDICINE | Facility: CLINIC | Age: 27
End: 2022-12-01

## 2022-12-01 NOTE — TELEPHONE ENCOUNTER
MTM referral from: [facility] visit (referral by provider)    MTM referral outreach attempt #2 on December 1, 2022 at 9:13 AM      Outcome: Patient not reachable after several attempts, will route to [facility] Pharmacist/Provider as an FYI.  [facility] scheduling number is [phone].  Thank you for the referral.    Megan Wilhelm [facility]

## 2022-12-01 NOTE — TELEPHONE ENCOUNTER
Routing to  to contact Patient and schedule MTM visit.  Please call Patient and document attempt(s).    Referred by: Sonia Quinones MD    Insurance: MTM is no charge to Patient at this time as part of her care at our clinic.    Daphney Fritz, Niharika  Medication Therapy Management (MTM) Pharmacist

## 2022-12-22 ENCOUNTER — MYC MEDICAL ADVICE (OUTPATIENT)
Dept: FAMILY MEDICINE | Facility: CLINIC | Age: 27
End: 2022-12-22

## 2022-12-22 NOTE — TELEPHONE ENCOUNTER
Call with pt, assisted to schedule appt with Dr. Quinones. Pt states she is using prn hydroxyzine more often for anxiety. Is working on establishing with psych; advised PCP will address at [facility]. Discussed [facility] at [facility]. Denies suicidal ideation; pt stated if she does have thoughts of self harm, she will go to ER.

## 2022-12-26 ENCOUNTER — VIRTUAL VISIT (OUTPATIENT)
Dept: FAMILY MEDICINE | Facility: CLINIC | Age: 27
End: 2022-12-26
Payer: COMMERCIAL

## 2022-12-26 DIAGNOSIS — F43.10 PTSD (POST-TRAUMATIC STRESS DISORDER): ICD-10-CM

## 2022-12-26 DIAGNOSIS — F33.2 MAJOR DEPRESSIVE DISORDER, RECURRENT, SEVERE WITHOUT PSYCHOTIC BEHAVIOR (H): Primary | ICD-10-CM

## 2022-12-26 DIAGNOSIS — F41.1 GENERALIZED ANXIETY DISORDER: ICD-10-CM

## 2022-12-26 DIAGNOSIS — R45.851 SUICIDAL IDEATION: ICD-10-CM

## 2022-12-26 PROCEDURE — 99214 OFFICE O/P EST MOD 30 MIN: CPT | Mod: 95 | Performed by: FAMILY MEDICINE

## 2022-12-26 RX ORDER — SERTRALINE HYDROCHLORIDE 100 MG/1
200 TABLET, FILM COATED ORAL DAILY
Qty: 120 TABLET | Refills: 0 | Status: SHIPPED | OUTPATIENT
Start: 2022-12-26 | End: 2023-04-12

## 2022-12-26 ASSESSMENT — PATIENT HEALTH QUESTIONNAIRE - PHQ9
SUM OF ALL RESPONSES TO PHQ QUESTIONS 1-9: 19
5. POOR APPETITE OR OVEREATING: NEARLY EVERY DAY

## 2022-12-26 ASSESSMENT — ANXIETY QUESTIONNAIRES
IF YOU CHECKED OFF ANY PROBLEMS ON THIS QUESTIONNAIRE, HOW DIFFICULT HAVE THESE PROBLEMS MADE IT FOR YOU TO DO YOUR WORK, TAKE CARE OF THINGS AT HOME, OR GET ALONG WITH OTHER PEOPLE: VERY DIFFICULT
7. FEELING AFRAID AS IF SOMETHING AWFUL MIGHT HAPPEN: SEVERAL DAYS
6. BECOMING EASILY ANNOYED OR IRRITABLE: MORE THAN HALF THE DAYS
2. NOT BEING ABLE TO STOP OR CONTROL WORRYING: MORE THAN HALF THE DAYS
3. WORRYING TOO MUCH ABOUT DIFFERENT THINGS: MORE THAN HALF THE DAYS
5. BEING SO RESTLESS THAT IT IS HARD TO SIT STILL: NEARLY EVERY DAY
GAD7 TOTAL SCORE: 16
1. FEELING NERVOUS, ANXIOUS, OR ON EDGE: NEARLY EVERY DAY
GAD7 TOTAL SCORE: 16

## 2022-12-26 NOTE — PROGRESS NOTES
Sara is a 27 year old who is being evaluated via a billable video visit.      How would you like to obtain your AVS? MyChart  If the video visit is dropped, the invitation should be resent by: Text to cell phone: [phone]  Will anyone else be joining your video visit? No      PATIENT HEALTH QUESTIONNAIRE-9 (PHQ - 9)    Over the last 2 weeks, how often have you been bothered by any of the following problems?    1. Little interest or pleasure in doing things -  Nearly every day   2. Feeling down, depressed, or hopeless -  More than half the days   3. Trouble falling or staying asleep, or sleeping too much - More than half the days   4. Feeling tired or having little energy -  More than half the days   5. Poor appetite or overeating -  More than half the days   6. Feeling bad about yourself - or that you are a failure or have let yourself or your family down -  Nearly every day   7. Trouble concentrating on things, such as reading the newspaper or watching television - Nearly every day   8. Moving or speaking so slowly that other people could have noticed? Or the opposite - being so fidgety or restless that you have been moving around a lot more than usual Several days   9. Thoughts that you would be better off dead or of hurting  yourself in some way Several days   Total Score: 19     If you checked off any problems, how difficult have these problems made it for you to do your work, take care of things at home, or get along with other people?      Developed by Susannah Avalos, Crystal Chand, Bart Cali and colleagues, with an educational ayaz from [employer] No permission required to reproduce, translate, display or distribute. permission required to reproduce, translate, display or distribute.    GAD7 score: 16    Assessment & Plan   Problem List Items Addressed This Visit        Behavioral    Major depressive disorder, recurrent, severe without psychotic behavior (H) - Primary    Relevant  Medications    sertraline (ZOLOFT) 100 MG tablet    Other Relevant Orders    Adult Mental Health  Referral    Generalized anxiety disorder    Relevant Medications    sertraline (ZOLOFT) 100 MG tablet    Other Relevant Orders    Adult Mental Health  Referral   Other Visit Diagnoses     Suicidal ideation        Relevant Orders    Adult Mental Health  Referral    PTSD (post-traumatic stress disorder)        Relevant Medications    sertraline (ZOLOFT) 100 MG tablet         Patient has history of depression also reports that anxiety is currently poorly controlled she has been on sertraline 150 mg for about 4-1/2 years now and reports that suicidal thoughts are starting to creep back and that her depression and anxiety are now poorly controlled.  She has a 30-day supply of her sertraline 150 mg daily and has been trying to establish care with a psychiatrist.  Unfortunately she has not yet been able to establish care with 1.    She also has obesity and was previously started on Ozempic.  This was discontinued and she never had a chance to get it titrated up to a therapeutic dose.    She has previously been on prazosin for nightmares associated with PTSD.  She did not feel like she got good quality sleep however with her depression being poorly controlled she is also now experiencing PTSD nightmares.    Assessment and plan patient with depression, anxiety, PTSD, now with some suicidal ideation as well as struggling with obesity.    She has an appointment scheduled the end of January with a counselor that specializes in trauma but would appreciate some bridging between now and then.  Mental health  referral placed as well as a message sent to [facility] to see if we can get patient some bridging therapy until she can get established with therapist at the end of January.    Her previous PCP has discontinued her Ozempic.  She has an appointment with Dr. Fritz on December 30.  Today I  "am increasing patient's sertraline from 150 mg to 200 mg daily.  Patient and Dr. Fritz can decide together on the 30th if they want to resume the Ozempic for obesity or consider adding some Zyprexa or quetiapine to help with sleep as well as the anxiety and depression.    Encouraged patient to look into accelerated resolution therapy for her PTSD.  She has previously tried EMDR without success.    We will plan to follow-up in 2 weeks but sooner if needed.  She does say that she does not have active suicidal plans today and at this moment does not have suicidal ideation.  She and her dad checkup twice a day and if patient develops worsening of suicidal thoughts she will going to get help at the hospital if needed.  She certainly welcome to reach out to me sooner if needed.    BMI:   Estimated body mass index is 39.41 kg/m  as calculated from the following:    Height as of 11/29/22: 1.645 m (5' 4.75\").    Weight as of 11/29/22: 106.6 kg (235 lb).   Weight management plan: Patient has an appointment with [facility] pharmacist on December 30.  They can discuss either restarting the Ozempic for obesity or adding either quetiapine or Zyprexa to help with sleep, anxiety and depression    Depression Screening Follow Up    PHQ 12/26/2022   PHQ-9 Total Score 19   Q9: Thoughts of better off dead/self-harm past 2 weeks Several days     Last PHQ-9 12/26/2022   1.  Little interest or pleasure in doing things 3   2.  Feeling down, depressed, or hopeless 2   3.  Trouble falling or staying asleep, or sleeping too much 2   4.  Feeling tired or having little energy 2   5.  Poor appetite or overeating 2   6.  Feeling bad about yourself 3   7.  Trouble concentrating 3   8.  Moving slowly or restless 1   Q9: Thoughts of better off dead/self-harm past 2 weeks 1   PHQ-9 Total Score 19   Difficulty at work, home, or with people -       {    Follow Up    Follow Up Actions Taken  Crisis resource information provided in the After Visit " Summary    Discussed the following ways the patient can remain in a safe environment:  be around others      Return in about 2 weeks (around 1/9/2023) for Follow up.    Sonia Quinones MD  [facility]    Angel Ruiz is a 27 year old, presenting for the following health issues:  No chief complaint on file.      HPI           Review of Systems         Objective           Vitals:  No vitals were obtained today due to virtual visit.    Physical Exam   GENERAL: Healthy, alert and no distress  EYES: Eyes grossly normal to inspection.  No discharge or erythema, or obvious scleral/conjunctival abnormalities.  RESP: No audible wheeze, cough, or visible cyanosis.  No visible retractions or increased work of breathing.    SKIN: Visible skin clear. No significant rash, abnormal pigmentation or lesions.  NEURO: Cranial nerves grossly intact.  Mentation and speech appropriate for age.  PSYCH: Mentation appears normal, affect sad, judgement and insight intact, normal speech and appearance well-groomed.                Video-Visit Details    Type of service:  Video Visit     Originating Location (pt. Location): Other [facility] where pt is working today    Distant Location (provider location):  On-site  Platform used for Video Visit: Other: [employer] for video and [employer] for audio  [id number]

## 2022-12-30 ENCOUNTER — VIRTUAL VISIT (OUTPATIENT)
Dept: PHARMACY | Facility: CLINIC | Age: 27
End: 2022-12-30
Payer: COMMERCIAL

## 2022-12-30 ENCOUNTER — TELEPHONE (OUTPATIENT)
Dept: FAMILY MEDICINE | Facility: CLINIC | Age: 27
End: 2022-12-30

## 2022-12-30 DIAGNOSIS — N92.1 PROLONGED MENSTRUATION: ICD-10-CM

## 2022-12-30 DIAGNOSIS — J30.9 ALLERGIC RHINITIS, UNSPECIFIED SEASONALITY, UNSPECIFIED TRIGGER: ICD-10-CM

## 2022-12-30 DIAGNOSIS — J38.3 VOCAL CORD DYSFUNCTION: ICD-10-CM

## 2022-12-30 DIAGNOSIS — F41.1 GENERALIZED ANXIETY DISORDER: ICD-10-CM

## 2022-12-30 DIAGNOSIS — L21.0 DANDRUFF: ICD-10-CM

## 2022-12-30 DIAGNOSIS — F43.10 POSTTRAUMATIC STRESS DISORDER: ICD-10-CM

## 2022-12-30 DIAGNOSIS — E28.2 PCOS (POLYCYSTIC OVARIAN SYNDROME): Primary | ICD-10-CM

## 2022-12-30 DIAGNOSIS — F33.2 MAJOR DEPRESSIVE DISORDER, RECURRENT, SEVERE WITHOUT PSYCHOTIC BEHAVIOR (H): ICD-10-CM

## 2022-12-30 DIAGNOSIS — J45.41 MODERATE PERSISTENT ASTHMA WITH EXACERBATION: ICD-10-CM

## 2022-12-30 PROCEDURE — 99607 MTMS BY PHARM ADDL 15 MIN: CPT | Performed by: PHARMACIST

## 2022-12-30 PROCEDURE — 99605 MTMS BY PHARM NP 15 MIN: CPT | Performed by: PHARMACIST

## 2022-12-30 RX ORDER — LIRAGLUTIDE 6 MG/ML
INJECTION, SOLUTION SUBCUTANEOUS
Qty: 15 ML | Refills: 0 | Status: SHIPPED | OUTPATIENT
Start: 2022-12-30 | End: 2023-04-12

## 2022-12-30 RX ORDER — FLUTICASONE PROPIONATE 220 UG/1
2 AEROSOL, METERED RESPIRATORY (INHALATION) 2 TIMES DAILY
COMMUNITY
End: 2024-04-01

## 2022-12-30 NOTE — Clinical Note
Would you like to place a dietitian referral; Patient would likely benefit to help aid in weight loss, she gets food from the free food pantry.

## 2022-12-30 NOTE — PROGRESS NOTES
Medication Therapy Management (MTM) Encounter    ASSESSMENT:                            Medication Adherence/Access: See below for considerations    PCOS: discussed that GLP-1s are not indicated for PCOS and I am unfamiliar with any evidence for efficacy. However, Patient may benefit from GLP-1 for weight loss indication -- see below.  I Will confirm with Dr.Monica Stacie MD if she would like to take over prescribing patient's oc.    Obesity:   Appropriate to use a GLP-1 since she found some benefit with her PCOS from this med class in the past. Preference for liraglutide over semaglutide at this time due to market shortages of semaglutide. Can transition back to semaglutide in the future if needed.  Discussed side effect risk with GLP-1 maikel since she's now been off ozempic for awhile. Also reviewed importance of regular meals to prevent the lower blood sugars that she experienced in the past; and eating protein with all meals to help prevent rapid hyperglycemia followed by lower blood sugars which may make her symptomatic.  Reviewed importance of lifestyle modifications in addition to pharmacotherapy.   Reviewed potential insurance limitation to coverage for weight loss meds.  Recommend establishing with dietitian - I will check with PCP on this referral.    Asthma/allergies/vocal cord dysfunction: not addressed in detail today due to other focus and time limitations. Recommend optimizing inhaler regimen and reviewing AAP at a future visit.    Depression/Anxiety/PTSD: recent med change by PCP; will continue to address at follow-up visits. We did discuss that there are many other med options for depression/anx that she can try if the increase in sertraline is not effective for her.    Dandruff: managed with ketoconazole - needing a refill, will confirm with PCP okay to manage this.    PLAN:                            1. Stop ozempic.   Start Saxenda 0.6 mg subcutaneous injection once daily; increase dose weekly  by 0.6 mg to max of 3 mg daily to promote weight loss.  Can titrate slower if needed. recommend eating slowly and eating small meals to decrease any risk of GI upset.    It is important to make lifestyle modifications to promote weight loss in addition to using this medication. I will ask Dr. Sonia Quinones MD to enter a referral to meet with our dietitian and hopefully your foot will heal up soon so you can start physical therapy.    2. I will check with Dr. Sonia Quinones MD whether she would like to refill your birth control and ketoconazole. I will also check with Dr.Monica Stacie MD to see if she would like to place a referral to our dietitian. Luis Carlos sent to PCP.    3. If you have thoughts of hurting yourself or others, please call the suicide prevention hotline: 313 from any phone.    Follow-up: 1 month with MTM 1/27/2023 at 8am by video visit.    Addended on January 3, 2023  Luis Carlos from PCP below. Meds refilled; also sent Patient a [employer] message with updates (see chart routing documentation for other details).  KB    ===View-only below this line===  ----- Message -----  From: Sonia Quinones MD  Sent: 1/2/2023   2:10 PM CST  To: Marisabel Fritz RPH  Subject: keotconazole/ocp                                 Sure, these can be refilled.  Xcan you do it the next time you see her?    MJP  ----- Message -----  From: Marisabel Fritz RP  Sent: 12/30/2022   7:12 PM CST  To: Sonia Quinones MD    Response requested: Patient was getting ketoconazole and oral contraceptive from her former PCP. Needing refills. Are you taking over PCP, do you want to refill these? Last orders were sent in 2021.    SUBJECTIVE/OBJECTIVE:                          Fabian Springer is a 27 year old female contacted via secure video for an initial visit. She was referred to me from Sonia Quinones MD.      Reason for visit: Patient has questions about her ozempic which she's been taking for PCOS.    Allergies/ADRs:  "Reviewed in chart  Past Medical History: Reviewed in chart  Tobacco: She reports that she has never smoked. She has never used smokeless tobacco.  Alcohol: 1-3 beverages / week  Caffeine: 18 oz coffee /day.  Social: lives in a townhouse with a roommate.    Medication Adherence/Access: Patient has had issues with access to her GLP-1> see below.    PCOS:   Notes that her previous provider started her on ozempic 0.25 mg once weekly after she did not tolerate the metformin. Last dose was given in the beginning of November.  She has never titrated the dose and recently was unable to access the medication due to insurance issues (per chart review the insurance requires a T2DM dx for coverage, which Patient does not have).   She notes that she feels the ozempic has helped lessen the frequency of her PCOS cyst pains. Get nauseated with the ovarian cysts and very painful. Has gone to the ER in the past and doesn't always get treatment, has been told by providers that her pain \"isn't that bad.\"  Currently taking norethindrone-ethinyl estradiol (JUNEL FE 1/20) 1-20 mg-mc tab once daily. Was prescribed by her previous provider whom she states was also and OBGYN provider; wondering if Dr. Sonia Quinones MD is willing to take on prescribing this for her.  Ibuprofen 200 mg: takes 2 tabs per dose; using over-the-counter if she has more pain than usual from PCOS, using about 1 time/month.  Medication History: metformin (tried XR formulation and this gave her upset stomach and diarrhea)    Obesity:   Current medication(s) include: none - Out of ozempic at this time, last use was the beginning of November. Previously tolerated ozempic fine in terms of GI upset. Notes that she started testing blood sugars since she had a few \"lows\" mostly was in the 70s, one time in the 60s- usually happened when she forgot a meal.  Diet/Eating Habits: Patient reports eating whatever she can get at the free food pantry; not a lot of protein " "options, intolerance to beef; pasta and rice, not a ton of protein.  Exercise: Patient reports that she used to go to the gym routinely, but did find it hard to exercise at her current weight . She just had foot surgery with [facility] () -  ligament and tendon tightening; bilateral internal brace system placed, removed scar tissues and ganglion cyst.  Has a follow-up on  with [facility] - thinks she'll be able to start physical therapy soon.  Failed medications include: none  Patient's personalized goal: Patient would like to feel beter about herself; enjoys working out and this is hard when weights a lot. Would like to be in the 100s and meet \"normal\" body weight for her height.  Current weight today: 0 lbs 0 oz virtual appointment.  Weight trend:  Wt Readings from Last 10 Encounters:   22 235 lb (106.6 kg)   10/29/21 234 lb 5 oz (106.3 kg)   21 235 lb 5 oz (106.7 kg)   21 234 lb 7 oz (106.3 kg)   03/15/21 243 lb (110.2 kg)   21 243 lb 1 oz (110.3 kg)   20 226 lb 9 oz (102.8 kg)   20 227 lb (103 kg)   19 215 lb (97.5 kg)   19 219 lb (99.3 kg)   Patient's comorbidities associated with obesity include: Depression., PCOS    Asthma/allergies/vocal cord dysfunction:  Symptoms: If having an asthma attack and she is coughing hard then her vocal cords will spasms and then hard to berath. Feels like she can breathe in and cannot breathe out.  Current asthma medications:   Albuterol MDI and Nebs. Using MDI once every couple of weeks. Only using the nebs when having an \"actual asthma attack\" last one was beginning of December when cleaning out the cat box and the dust got to her, now wearing a mask when cleaning out the cat box so not breathing the dust.  Flovent  mc puff(s) by mouth twice daily for 48 hours until her hives start to go down; started by an ER doctor and then her primary took it over. Only using this if having a problem with asthma or an allergy " reaction. Will use it preventatively if feels like an attack is coming on.   Asthma triggers include: dust mites and animal dander. Will take an allergy pill when going to see other people's pets or when pollens are high.  Cetirizine (Zyrtec) 10 mg: once daily as needed - using seasonally or when knows that she'll be near a trigger.  Medication History: flonase - not using, removed from med list.  AAP on file: NO  ACT Total Scores 2021 10/29/2021 2022   ACT TOTAL SCORE (Goal Greater than or Equal to 20) 21 20 22   In the past 12 months, how many times did you visit the emergency room for your asthma without being admitted to the hospital? 0 0 0   In the past 12 months, how many times were you hospitalized overnight because of your asthma? 0 0 0     Depression/Anxiety/PTSD:  Has feelings of both depression and anxiety per Patient.  Has an appointment with psychologist soon; seeing a counselor through [facility] in the meantime.  Dr. Sonia Quinones MD is planning to manage her mental health at this time. If unable to optimize then she might go see an psychiatrist in the future.  Hydroxyzine 50 mg: prescribed as 1 tab every 4 hours;  - usually using 1-2 times/week; right now using every day in the evening to help her sleep; bc she's dealing with a lots of suicidal thoughts. No ideation, no plan. She does feel hat she's safe at home not planning to hurt herself or others.  Sertraline 100 m tabs in the morning (Dr. Sonia Quinones MD just increased her dose) - been on this dose for less than 1 week. Can tell that her stomach is adjusting; feels different than takign 1 tablet, doesn't feel sick, feels like she's adjusting.  Medication History: no other antidepressant medications in the past.  PHQ 10/29/2021 2022 2022   PHQ-9 Total Score 12 9 19   Q9: Thoughts of better off dead/self-harm past 2 weeks Not at all Not at all Several days     GEMA-7 SCORE 2021 10/29/2021 2022    Total Score 10 (moderate anxiety) 13 (moderate anxiety) -   Total Score 10 13 16     Dandruff:  Ketoconazole 2% external shampoo: Patient using about once weekly; notices when she hasn't used in awhile bc her scalp gets itchy. Wondering if Dr. Sonia Quinones MD would prescribe this for her, she needs a refill.    Today's Vitals: There were no vitals taken for this visit.  ----------------    I spent 50 minutes with this patient today. All changes were made via collaborative practice agreement with Sonia Quinones MD. A copy of the visit note was provided to the patient's provider(s).    A summary of these recommendations was sent via [employer].    Daphney Fritz, VenkateshD  Medication Therapy Management (MTM) Pharmacist  [place] [place] [facility] (Tu/Th/Fr)  Clinic Phone: [phone]  Pager: [phone]    Telemedicine Visit Details  Type of service:  Video Conference via [employer]  Start Time: 12:05 pm  End Time: 12:55 pm  Originating Location (pt. Location): Other her school      Distant Location (provider location):  Off-site  Provider has received verbal consent for a visit from the patient? Yes     Medication Therapy Recommendations  BMI 40.0-44.9, adult (H)    Current Medication: liraglutide - Weight Management (SAXENDA) 18 MG/3ML pen   Rationale: Untreated condition - Needs additional medication therapy - Indication   Recommendation: Start Medication   Status: Accepted per CPA

## 2022-12-30 NOTE — Clinical Note
Response requested: Patient was getting ketoconazole and oral contraceptive from her former PCP. Needing refills. Are you taking over PCP, do you want to refill these? Last orders were sent in 2021.

## 2022-12-30 NOTE — TELEPHONE ENCOUNTER
Confirmed via test claim, pa needed on [employer] [place] [employer] pharmacy insurance.  BIN:[id number] PCN:[facility]:[id number] ID:[id number]    Please initiate PA.

## 2022-12-31 NOTE — PATIENT INSTRUCTIONS
"Recommendations from today's MTM visit:                                                    MTM (medication therapy management) is a service provided by a clinical pharmacist designed to help you get the most of out of your medicines.      1. Stop ozempic.   Start Saxenda 0.6 mg subcutaneous injection once daily; increase dose weekly by 0.6 mg to max of 3 mg daily to promote weight loss.  Can titrate slower if needed. recommend eating slowly and eating small meals to decrease any risk of GI upset.    It is important to make lifestyle modifications to promote weight loss in addition to using this medication. I will ask Dr. Sonia Quinones MD to enter a referral to meet with our dietitian and hopefully your foot will heal up soon so you can start physical therapy.    2. I will check with Dr. Sonia Quinones MD whether she would like to refill your birth control and ketoconazole. I will also check with Dr.Monica Stacie MD to see if she would like to place a referral to our dietitian.     3. If you have thoughts of hurting yourself or others, please call the suicide prevention hotline: 840 from any phone.    Follow-up: 1 month with MTM 1/27/2023 at 8am by video visit.    It was great speaking with you today.  I value your experience and would be very thankful for your time in providing feedback in our clinic survey. In the next few days, you may receive an email or text message from [employer] with a link to a survey related to your  clinical pharmacist.\"     To schedule another MTM appointment, please call the clinic directly or you may call the MTM scheduling line at [phone] or toll-free at [phone].     My Clinical Pharmacist's contact information:                                                      Please feel free to contact me with any questions or concerns you have.      Daphney Fritz, PharmD  Medication Therapy Management (MTM) Pharmacist   "

## 2022-12-31 NOTE — TELEPHONE ENCOUNTER
Note - Patient does not have diabetes. The liraglutide is for obesity.  Daphney Fritz, PharmD  Medication Therapy Management (MTM) Pharmacist

## 2023-01-03 RX ORDER — NORETHINDRONE ACETATE AND ETHINYL ESTRADIOL 1MG-20(21)
1 KIT ORAL DAILY
Qty: 84 TABLET | Refills: 3 | Status: SHIPPED | OUTPATIENT
Start: 2023-01-03 | End: 2024-04-01

## 2023-01-03 RX ORDER — KETOCONAZOLE 20 MG/ML
SHAMPOO TOPICAL
Qty: 120 ML | Refills: 3 | Status: SHIPPED | OUTPATIENT
Start: 2023-01-03 | End: 2023-01-05

## 2023-01-03 NOTE — TELEPHONE ENCOUNTER
Here is the dx from her PCP: Class 2 obesity due to excess calories without serious comorbidity with body mass index (BMI) of 39.0 to 39.9 in adult [E66.09, Z68.39]     Thanks!

## 2023-01-03 NOTE — TELEPHONE ENCOUNTER
Central Prior Authorization Team   Phone: [phone]      PA Initiation    Medication: Saxenda  Insurance Company: [employer] - Phone [phone] Fax [phone]  Pharmacy Filling the Rx: [address] - [facility] [address]  Filling Pharmacy Phone: [phone]  Filling Pharmacy Fax:    Start Date: 1/3/2023

## 2023-01-04 ENCOUNTER — TELEPHONE (OUTPATIENT)
Dept: FAMILY MEDICINE | Facility: CLINIC | Age: 28
End: 2023-01-04

## 2023-01-04 NOTE — TELEPHONE ENCOUNTER
Kike denied- per patients pharmacy benefits they do not cover weight loss medications. Please advise on how you would like to proceed. If appealed please provide medical rational.

## 2023-01-04 NOTE — TELEPHONE ENCOUNTER
PRIOR AUTHORIZATION DENIED    Medication: Saxenda-DENIED    Denial Date: 1/4/2023    Denial Rational:         Appeal Information:

## 2023-01-05 RX ORDER — KETOCONAZOLE 20 MG/ML
SHAMPOO TOPICAL
Qty: 120 ML | Refills: 3 | Status: SHIPPED | OUTPATIENT
Start: 2023-01-05

## 2023-01-05 NOTE — TELEPHONE ENCOUNTER
Please clarify for pharmacy     Thank you,    Fariba Bejarano RN   
I do not believe that 1% is available on the market and Patient has been using 2%. Will send new Rx with correct sig.  
[facility] pharmacy needs clarification on Ketoconazole shampoo. Is it 2% or 1%? 1% is in the SIG but 2% was ordered    Please advise    
Will leave for Daphney to address tomorrow.   
Oriented - self; Oriented - place; Oriented - time

## 2023-01-27 ENCOUNTER — TELEPHONE (OUTPATIENT)
Dept: FAMILY MEDICINE | Facility: CLINIC | Age: 28
End: 2023-01-27
Payer: COMMERCIAL

## 2023-01-27 ENCOUNTER — TELEPHONE (OUTPATIENT)
Dept: PHARMACY | Facility: CLINIC | Age: 28
End: 2023-01-27
Payer: COMMERCIAL

## 2023-01-27 ENCOUNTER — VIRTUAL VISIT (OUTPATIENT)
Dept: PHARMACY | Facility: CLINIC | Age: 28
End: 2023-01-27
Payer: COMMERCIAL

## 2023-01-27 DIAGNOSIS — Z53.9 ERRONEOUS ENCOUNTER--DISREGARD: Primary | ICD-10-CM

## 2023-01-27 NOTE — TELEPHONE ENCOUNTER
PharmD Outbound Call:    Reason for call: MTM visit today; were going to have an appointment but Patient needs to check into her insurance coverage for 2023 for weight loss medication and also insurance coverage for MTM appointments.    Will send Patient a [employer] message with next steps and then Patient to schedule a follow-up appointment after.    See [employer] message for details.    Daphney Fritz PharmD  Medication Therapy Management (MTM) Pharmacist

## 2023-01-27 NOTE — TELEPHONE ENCOUNTER
Prior Authorization Retail Medication Request    Medication/Dose: Saxenda 18 mg/3 mL pen  ICD code (if different than what is on RX):    Previously Tried and Failed:    Rationale:  Patient has weight gain associated with PCOS. Has found symptomatic benefit from GLP-1 RA in the past and wanting to restart therapy.  Recommend pharmacotherapy for weight management.    Insurance Name:    Insurance ID:      Pharmacy Information (if different than what is on RX)  Name:    Phone:

## 2023-01-27 NOTE — Clinical Note
January 27, 2023  Fabian Springer  [address] [place] [id number]    Dear ANA LILIA Yoo [facility]     Thank you for talking with me on Jan 27, 2023 about your health and medications. As a follow-up to our conversation, I have included two documents:      1. Your Recommended To-Do List has steps you should take to get the best results from your medications.  2. Your Medication List will help you keep track of your medications and how to take them.    If you want to talk about these documents, please call Marisabel Fritz RPH at phone: [phone], Monday-Friday 8-4:30pm.    I look forward to working with you and your doctors to make sure your medications work well for you.    Sincerely,  Marisabel Fritz RPH  [facility] Pharmacist, [facility]

## 2023-01-30 ENCOUNTER — OFFICE VISIT (OUTPATIENT)
Dept: FAMILY MEDICINE | Facility: CLINIC | Age: 28
End: 2023-01-30
Payer: COMMERCIAL

## 2023-01-30 VITALS
RESPIRATION RATE: 16 BRPM | OXYGEN SATURATION: 98 % | DIASTOLIC BLOOD PRESSURE: 64 MMHG | SYSTOLIC BLOOD PRESSURE: 98 MMHG | BODY MASS INDEX: 39.49 KG/M2 | WEIGHT: 237 LBS | HEIGHT: 65 IN | HEART RATE: 95 BPM

## 2023-01-30 DIAGNOSIS — R41.840 POOR CONCENTRATION: Primary | ICD-10-CM

## 2023-01-30 DIAGNOSIS — E66.09 CLASS 2 OBESITY DUE TO EXCESS CALORIES WITHOUT SERIOUS COMORBIDITY WITH BODY MASS INDEX (BMI) OF 39.0 TO 39.9 IN ADULT: ICD-10-CM

## 2023-01-30 DIAGNOSIS — L01.00 IMPETIGO: ICD-10-CM

## 2023-01-30 DIAGNOSIS — F42.2 MIXED OBSESSIONAL THOUGHTS AND ACTS: ICD-10-CM

## 2023-01-30 DIAGNOSIS — F41.1 GENERALIZED ANXIETY DISORDER: ICD-10-CM

## 2023-01-30 DIAGNOSIS — F43.10 POSTTRAUMATIC STRESS DISORDER: ICD-10-CM

## 2023-01-30 DIAGNOSIS — E66.812 CLASS 2 OBESITY DUE TO EXCESS CALORIES WITHOUT SERIOUS COMORBIDITY WITH BODY MASS INDEX (BMI) OF 39.0 TO 39.9 IN ADULT: ICD-10-CM

## 2023-01-30 DIAGNOSIS — R45.851 SUICIDAL IDEATION: ICD-10-CM

## 2023-01-30 PROCEDURE — 99215 OFFICE O/P EST HI 40 MIN: CPT | Performed by: FAMILY MEDICINE

## 2023-01-30 PROCEDURE — 96127 BRIEF EMOTIONAL/BEHAV ASSMT: CPT | Performed by: FAMILY MEDICINE

## 2023-01-30 RX ORDER — HYDROXYZINE HYDROCHLORIDE 25 MG/1
50 TABLET, FILM COATED ORAL EVERY 4 HOURS PRN
Qty: 45 TABLET | Refills: 11 | Status: SHIPPED | OUTPATIENT
Start: 2023-01-30 | End: 2024-04-01

## 2023-01-30 RX ORDER — BUPROPION HYDROCHLORIDE 150 MG/1
150 TABLET ORAL EVERY MORNING
Qty: 30 TABLET | Refills: 1 | Status: SHIPPED | OUTPATIENT
Start: 2023-01-30 | End: 2024-02-26

## 2023-01-30 RX ORDER — CEPHALEXIN 500 MG/1
500 CAPSULE ORAL 3 TIMES DAILY
Qty: 21 CAPSULE | Refills: 0 | Status: SHIPPED | OUTPATIENT
Start: 2023-01-30 | End: 2023-02-06

## 2023-01-30 ASSESSMENT — PATIENT HEALTH QUESTIONNAIRE - PHQ9
10. IF YOU CHECKED OFF ANY PROBLEMS, HOW DIFFICULT HAVE THESE PROBLEMS MADE IT FOR YOU TO DO YOUR WORK, TAKE CARE OF THINGS AT HOME, OR GET ALONG WITH OTHER PEOPLE: VERY DIFFICULT
SUM OF ALL RESPONSES TO PHQ QUESTIONS 1-9: 15
SUM OF ALL RESPONSES TO PHQ QUESTIONS 1-9: 15

## 2023-01-30 ASSESSMENT — ANXIETY QUESTIONNAIRES
7. FEELING AFRAID AS IF SOMETHING AWFUL MIGHT HAPPEN: MORE THAN HALF THE DAYS
8. IF YOU CHECKED OFF ANY PROBLEMS, HOW DIFFICULT HAVE THESE MADE IT FOR YOU TO DO YOUR WORK, TAKE CARE OF THINGS AT HOME, OR GET ALONG WITH OTHER PEOPLE?: VERY DIFFICULT
7. FEELING AFRAID AS IF SOMETHING AWFUL MIGHT HAPPEN: MORE THAN HALF THE DAYS
2. NOT BEING ABLE TO STOP OR CONTROL WORRYING: MORE THAN HALF THE DAYS
1. FEELING NERVOUS, ANXIOUS, OR ON EDGE: MORE THAN HALF THE DAYS
6. BECOMING EASILY ANNOYED OR IRRITABLE: MORE THAN HALF THE DAYS
GAD7 TOTAL SCORE: 14
GAD7 TOTAL SCORE: 14
5. BEING SO RESTLESS THAT IT IS HARD TO SIT STILL: MORE THAN HALF THE DAYS
3. WORRYING TOO MUCH ABOUT DIFFERENT THINGS: MORE THAN HALF THE DAYS
4. TROUBLE RELAXING: MORE THAN HALF THE DAYS
GAD7 TOTAL SCORE: 14
IF YOU CHECKED OFF ANY PROBLEMS ON THIS QUESTIONNAIRE, HOW DIFFICULT HAVE THESE PROBLEMS MADE IT FOR YOU TO DO YOUR WORK, TAKE CARE OF THINGS AT HOME, OR GET ALONG WITH OTHER PEOPLE: VERY DIFFICULT
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
## 2023-01-30 NOTE — PATIENT INSTRUCTIONS
Please see options for Behavioral Health specialists.    [facility] [place] [phone]  [email or web address] [email or web address] and [employer] in [place]  [phone]  [email or web address]    [facility] Neuro[facility]iat[facility] - memory and ADHD evaluations - [phone]       Also consider   [email or web address]    Consider [facility] [place] for counseling.

## 2023-02-01 NOTE — TELEPHONE ENCOUNTER
Patient has new insurance in 2023 due to new employer. The denial from 12/30/2022 is from the old insurance.     Please try for Prior Authorization with the new insurance.  KB

## 2023-02-01 NOTE — TELEPHONE ENCOUNTER
Prior Authorization Approval    Authorization Effective Date: 1/2/2023  Authorization Expiration Date: 6/1/2023  Medication: liraglutide - Weight Management (SAXENDA) 18 MG/3ML pen--APPROVED  Approved Dose/Quantity:   Reference #:     Insurance Company: [employer] - Phone [phone] Fax [phone]  Expected CoPay:       CoPay Card Available:      Foundation Assistance Needed:    Which Pharmacy is filling the prescription (Not needed for infusion/clinic administered): [facility], [place] [id number] [address]  Pharmacy Notified: Yes  Patient Notified: Yes **Instructed pharmacy to notify patient when script is ready to /ship.**

## 2023-02-01 NOTE — TELEPHONE ENCOUNTER
PA Initiation    Medication: liraglutide - Weight Management (SAXENDA) 18 MG/3ML pen   Insurance Company: [employer] - Phone [phone] Fax [phone]  Pharmacy Filling the Rx: [facility] [address] [place], [place] [address], [place] - [facility] [address]  Filling Pharmacy Phone: [phone]  Filling Pharmacy Fax: [phone]  Start Date: 2/1/2023

## 2023-02-02 NOTE — TELEPHONE ENCOUNTER
Hi pharmacy liaison team.     It looks like this med has not yet been released to the pharmacy?   Is it stuck in MSOT?     Thanks for taking a look.  KB

## 2023-02-10 NOTE — TELEPHONE ENCOUNTER
Update - I checked with our MTM coordinators and with the patient's current [place] insurance plan, MTM visits would be private pay.  Will communicate to Patient by Sp[employer].  ARYA

## 2023-02-24 ENCOUNTER — VIRTUAL VISIT (OUTPATIENT)
Dept: EDUCATION SERVICES | Facility: CLINIC | Age: 28
End: 2023-02-24
Attending: FAMILY MEDICINE
Payer: COMMERCIAL

## 2023-02-24 DIAGNOSIS — Z53.9 NO SHOW: Primary | ICD-10-CM

## 2023-02-24 DIAGNOSIS — E66.09 CLASS 2 OBESITY DUE TO EXCESS CALORIES WITHOUT SERIOUS COMORBIDITY WITH BODY MASS INDEX (BMI) OF 39.0 TO 39.9 IN ADULT: ICD-10-CM

## 2023-02-24 DIAGNOSIS — E66.812 CLASS 2 OBESITY DUE TO EXCESS CALORIES WITHOUT SERIOUS COMORBIDITY WITH BODY MASS INDEX (BMI) OF 39.0 TO 39.9 IN ADULT: ICD-10-CM

## 2023-02-24 NOTE — LETTER
2/24/2023         RE: Fabian Springer  [address] [place] [id number]        Dear Colleague,    Thank you for referring your patient, Fabian Springer, to the [facility]ABET[facility]UC[facility] [place]. Please see a copy of my visit note below.    Patient no-showed today's appointment; appointment was for Diabetes Education.     [employer] sent with re-scheduling information.     Soni Jones RD, LD, CDE  Diabetes Education

## 2023-02-26 NOTE — PROGRESS NOTES
Patient no-showed today's appointment; appointment was for Diabetes Education.     [facility].     Soni Jones RD, LD, CDE  Diabetes Education

## 2023-04-12 ENCOUNTER — OFFICE VISIT (OUTPATIENT)
Dept: FAMILY MEDICINE | Facility: CLINIC | Age: 28
End: 2023-04-12
Payer: COMMERCIAL

## 2023-04-12 VITALS
WEIGHT: 237 LBS | DIASTOLIC BLOOD PRESSURE: 60 MMHG | SYSTOLIC BLOOD PRESSURE: 110 MMHG | TEMPERATURE: 97.9 F | HEIGHT: 65 IN | RESPIRATION RATE: 16 BRPM | HEART RATE: 74 BPM | OXYGEN SATURATION: 98 % | BODY MASS INDEX: 39.49 KG/M2

## 2023-04-12 DIAGNOSIS — E66.09 CLASS 2 OBESITY DUE TO EXCESS CALORIES WITHOUT SERIOUS COMORBIDITY WITH BODY MASS INDEX (BMI) OF 39.0 TO 39.9 IN ADULT: ICD-10-CM

## 2023-04-12 DIAGNOSIS — F33.2 MAJOR DEPRESSIVE DISORDER, RECURRENT, SEVERE WITHOUT PSYCHOTIC BEHAVIOR (H): ICD-10-CM

## 2023-04-12 DIAGNOSIS — R45.851 SUICIDAL IDEATION: ICD-10-CM

## 2023-04-12 DIAGNOSIS — F41.1 GENERALIZED ANXIETY DISORDER: Primary | ICD-10-CM

## 2023-04-12 DIAGNOSIS — F43.10 POSTTRAUMATIC STRESS DISORDER: ICD-10-CM

## 2023-04-12 DIAGNOSIS — E66.812 CLASS 2 OBESITY DUE TO EXCESS CALORIES WITHOUT SERIOUS COMORBIDITY WITH BODY MASS INDEX (BMI) OF 39.0 TO 39.9 IN ADULT: ICD-10-CM

## 2023-04-12 PROCEDURE — 99214 OFFICE O/P EST MOD 30 MIN: CPT | Performed by: FAMILY MEDICINE

## 2023-04-12 RX ORDER — BUPROPION HYDROCHLORIDE 300 MG/1
300 TABLET ORAL EVERY MORNING
Qty: 60 TABLET | Refills: 0 | Status: SHIPPED | OUTPATIENT
Start: 2023-04-12 | End: 2023-07-21

## 2023-04-12 RX ORDER — SERTRALINE HYDROCHLORIDE 100 MG/1
150 TABLET, FILM COATED ORAL DAILY
Qty: 90 TABLET | Refills: 0 | Status: SHIPPED | OUTPATIENT
Start: 2023-04-12 | End: 2023-07-21

## 2023-04-12 ASSESSMENT — PATIENT HEALTH QUESTIONNAIRE - PHQ9
SUM OF ALL RESPONSES TO PHQ QUESTIONS 1-9: 8
SUM OF ALL RESPONSES TO PHQ QUESTIONS 1-9: 8
10. IF YOU CHECKED OFF ANY PROBLEMS, HOW DIFFICULT HAVE THESE PROBLEMS MADE IT FOR YOU TO DO YOUR WORK, TAKE CARE OF THINGS AT HOME, OR GET ALONG WITH OTHER PEOPLE: SOMEWHAT DIFFICULT

## 2023-04-12 ASSESSMENT — ANXIETY QUESTIONNAIRES
1. FEELING NERVOUS, ANXIOUS, OR ON EDGE: SEVERAL DAYS
GAD7 TOTAL SCORE: 7
GAD7 TOTAL SCORE: 7
8. IF YOU CHECKED OFF ANY PROBLEMS, HOW DIFFICULT HAVE THESE MADE IT FOR YOU TO DO YOUR WORK, TAKE CARE OF THINGS AT HOME, OR GET ALONG WITH OTHER PEOPLE?: SOMEWHAT DIFFICULT
7. FEELING AFRAID AS IF SOMETHING AWFUL MIGHT HAPPEN: SEVERAL DAYS
IF YOU CHECKED OFF ANY PROBLEMS ON THIS QUESTIONNAIRE, HOW DIFFICULT HAVE THESE PROBLEMS MADE IT FOR YOU TO DO YOUR WORK, TAKE CARE OF THINGS AT HOME, OR GET ALONG WITH OTHER PEOPLE: SOMEWHAT DIFFICULT
2. NOT BEING ABLE TO STOP OR CONTROL WORRYING: SEVERAL DAYS
3. WORRYING TOO MUCH ABOUT DIFFERENT THINGS: SEVERAL DAYS
6. BECOMING EASILY ANNOYED OR IRRITABLE: SEVERAL DAYS
4. TROUBLE RELAXING: SEVERAL DAYS
GAD7 TOTAL SCORE: 7
7. FEELING AFRAID AS IF SOMETHING AWFUL MIGHT HAPPEN: SEVERAL DAYS
5. BEING SO RESTLESS THAT IT IS HARD TO SIT STILL: SEVERAL DAYS

## 2023-04-12 NOTE — PROGRESS NOTES
Assessment & Plan   Problem List Items Addressed This Visit        Behavioral    Major depressive disorder, recurrent, severe without psychotic behavior (H)    Posttraumatic stress disorder    Generalized anxiety disorder - Primary   Other Visit Diagnoses     Suicidal ideation          At her last visit we continued sertraline 200 mg daily and added Wellbutrin 150 mg once daily of the extended release.  There has been improvement in her GEMA-7 and PHQ-9.  She also moved recently and for about 5 days could not locate her sertraline so had some symptoms of abrupt withdrawal.  She was able to take her sertraline today and a lot of those withdrawal symptoms have improved significantly.  She feels like the Wellbutrin is helping her concentrate.  She is sleeping better at night especially now that she no longer has a male roommate.  She is also in a new relationship with a partner who has a degree in accounting and feels like they are both in a healthy place and her having a healthy relationship.  She continues to work with her therapist who has experience with trauma, ADHD and OCD.        No suicidal ideation since March 23,     We have decided that we will try decreasing the sertraline to 150 mg daily.  As she has pretty much gone through withdrawal of her sertraline I think that the 50 mg drop should be well-tolerated however in the future if we decide to taper this further we will probably use 25 mg increments.  We are also going to increase the Wellbutrin to 300 mg daily.  In the future we could consider adding naltrexone to see if the combination of bupropion with naltrexone also helps with weight loss.  Unfortunately patient's insurance did not provide coverage for the GLP-1 inhibitor.            {Provider  Link to [facility] Help Grid          Sonia Quinones MD  [facility]    Angel Ruiz is a 27 year old, presenting for the following health issues:  Recheck Medication          "View : No data to display.              History of Present Illness       Mental Health Follow-up:  Patient presents to follow-up on Depression & Anxiety.Patient's depression since last visit has been:  Better  The patient is not having other symptoms associated with depression.  Patient's anxiety since last visit has been:  Better  The patient is not having other symptoms associated with anxiety.  Any significant life events: job concerns, housing concerns and health concerns  Patient is feeling anxious or having panic attacks.  Patient has no concerns about alcohol or drug use.    She eats 0-1 servings of fruits and vegetables daily.She consumes 2 sweetened beverage(s) daily.She exercises with enough effort to increase her heart rate 30 to 60 minutes per day.  She exercises with enough effort to increase her heart rate 4 days per week.   She is taking medications regularly.    Today's PHQ-9         PHQ-9 Total Score: 8    PHQ-9 Q9 Thoughts of better off dead/self-harm past 2 weeks :   Not at all    How difficult have these problems made it for you to do your work, take care of things at home, or get along with other people: Somewhat difficult  Today's GEMA-7 Score: 7               Review of Systems         Objective    /60 (BP Location: Right arm, Patient Position: Sitting)   Pulse 74   Temp 97.9  F (36.6  C) (Tympanic)   Resp 16   Ht 1.657 m (5' 5.25\")   Wt 107.5 kg (237 lb)   SpO2 98%   BMI 39.14 kg/m    Body mass index is 39.14 kg/m .  Physical Exam                       "

## 2023-04-16 ENCOUNTER — HEALTH MAINTENANCE LETTER (OUTPATIENT)
Age: 28
End: 2023-04-16

## 2023-06-19 ENCOUNTER — OFFICE VISIT (OUTPATIENT)
Dept: FAMILY MEDICINE | Facility: CLINIC | Age: 28
End: 2023-06-19
Payer: COMMERCIAL

## 2023-06-19 VITALS
RESPIRATION RATE: 16 BRPM | OXYGEN SATURATION: 97 % | WEIGHT: 246.7 LBS | DIASTOLIC BLOOD PRESSURE: 60 MMHG | HEART RATE: 88 BPM | SYSTOLIC BLOOD PRESSURE: 100 MMHG | BODY MASS INDEX: 40.74 KG/M2

## 2023-06-19 DIAGNOSIS — B35.4 RINGWORM OF BODY: Primary | ICD-10-CM

## 2023-06-19 DIAGNOSIS — R19.5 LOOSE STOOLS: ICD-10-CM

## 2023-06-19 DIAGNOSIS — R21 RASH: ICD-10-CM

## 2023-06-19 PROCEDURE — 99214 OFFICE O/P EST MOD 30 MIN: CPT | Performed by: FAMILY MEDICINE

## 2023-06-19 PROCEDURE — 86364 TISS TRNSGLTMNASE EA IG CLAS: CPT | Performed by: FAMILY MEDICINE

## 2023-06-19 PROCEDURE — 99000 SPECIMEN HANDLING OFFICE-LAB: CPT | Performed by: FAMILY MEDICINE

## 2023-06-19 PROCEDURE — 36415 COLL VENOUS BLD VENIPUNCTURE: CPT | Performed by: FAMILY MEDICINE

## 2023-06-19 PROCEDURE — 82784 ASSAY IGA/IGD/IGG/IGM EACH: CPT | Performed by: FAMILY MEDICINE

## 2023-06-19 PROCEDURE — 86258 DGP ANTIBODY EACH IG CLASS: CPT | Performed by: FAMILY MEDICINE

## 2023-06-19 PROCEDURE — 86231 EMA EACH IG CLASS: CPT | Mod: 90 | Performed by: FAMILY MEDICINE

## 2023-06-19 PROCEDURE — 81376 HLA II TYPING 1 LOCUS LR: CPT | Performed by: FAMILY MEDICINE

## 2023-06-19 RX ORDER — NALTREXONE HYDROCHLORIDE 50 MG/1
TABLET, FILM COATED ORAL
Qty: 45 TABLET | Refills: 1 | Status: SHIPPED | OUTPATIENT
Start: 2023-06-19 | End: 2024-04-01

## 2023-06-19 RX ORDER — CLOTRIMAZOLE 1 %
CREAM (GRAM) TOPICAL 2 TIMES DAILY
Qty: 30 G | Refills: 0 | Status: SHIPPED | OUTPATIENT
Start: 2023-06-19 | End: 2024-04-01

## 2023-06-19 ASSESSMENT — ASTHMA QUESTIONNAIRES
QUESTION_1 LAST FOUR WEEKS HOW MUCH OF THE TIME DID YOUR ASTHMA KEEP YOU FROM GETTING AS MUCH DONE AT WORK, SCHOOL OR AT HOME: SOME OF THE TIME
QUESTION_4 LAST FOUR WEEKS HOW OFTEN HAVE YOU USED YOUR RESCUE INHALER OR NEBULIZER MEDICATION (SUCH AS ALBUTEROL): TWO OR THREE TIMES PER WEEK
QUESTION_3 LAST FOUR WEEKS HOW OFTEN DID YOUR ASTHMA SYMPTOMS (WHEEZING, COUGHING, SHORTNESS OF BREATH, CHEST TIGHTNESS OR PAIN) WAKE YOU UP AT NIGHT OR EARLIER THAN USUAL IN THE MORNING: NOT AT ALL
ACT_TOTALSCORE: 18
QUESTION_2 LAST FOUR WEEKS HOW OFTEN HAVE YOU HAD SHORTNESS OF BREATH: THREE TO SIX TIMES A WEEK
ACT_TOTALSCORE: 18
QUESTION_5 LAST FOUR WEEKS HOW WOULD YOU RATE YOUR ASTHMA CONTROL: WELL CONTROLLED

## 2023-06-19 ASSESSMENT — PATIENT HEALTH QUESTIONNAIRE - PHQ9
SUM OF ALL RESPONSES TO PHQ QUESTIONS 1-9: 7
10. IF YOU CHECKED OFF ANY PROBLEMS, HOW DIFFICULT HAVE THESE PROBLEMS MADE IT FOR YOU TO DO YOUR WORK, TAKE CARE OF THINGS AT HOME, OR GET ALONG WITH OTHER PEOPLE: SOMEWHAT DIFFICULT
SUM OF ALL RESPONSES TO PHQ QUESTIONS 1-9: 7

## 2023-06-19 NOTE — PROGRESS NOTES
Assessment & Plan   Problem List Items Addressed This Visit        Digestive    BMI 40.0-44.9, adult (H)    Relevant Medications    naltrexone (DEPADE/REVIA) 50 MG tablet   Other Visit Diagnoses     Ringworm of body    -  Primary    Relevant Medications    clotrimazole (LOTRIMIN) 1 % external cream    Rash        Relevant Medications    clotrimazole (LOTRIMIN) 1 % external cream    Other Relevant Orders    IgA [LAB73]    Deamidated Giladin Peptide Eugenio IgA IgG [ENB1163]    Tissue transglutaminase eugenio IgA and IgG [SHQ0772]    Endomysial Antibody IgA by IFA [LBP5275]    HLA DQB1 for Celiac Disease [JLR1493]    Loose stools        Relevant Orders    IgA [LAB73]    Deamidated Giladin Peptide Eugenio IgA IgG [DVG1168]    Tissue transglutaminase eugenio IgA and IgG [OBO4454]    Endomysial Antibody IgA by IFA [KTD4528]    HLA DQB1 for Celiac Disease [NFA8598]      Patient has had a coin sized rash on her right upper thigh for the past 1 to 2 weeks that is itchy.  The outer edge she has some scaling.  Mild central clearing.  Reminds her of a previous episode of ringworm    She has had a generalized rash that is not itchy that occurs at her hair follicles.  It had improved with the use of Keflex but has not resolved.  She also has some fecal urgency, she stools 1-2 times per day she has about 1 formed stool per week otherwise are usually soft, she has some nausea before having a bowel movement.  She does drink 20 to 25 ounces of coffee on a daily basis.  Certainly possible that this rash is related to celiac disease.  She does have wheat in her diet on a regular basis.  Could also be related to the mono coffee that she is in taking.  Not sure how the rash could be related to coffee consumption.  We will plan to check for celiac disease.  Genetic consent form signed.  If the celiac disease is negative and she would like to try doing Keflex we could try this for 10 days and see if it improves with a longer course of  "treatment.    Obesity patient reports that Wellbutrin does not seem to be assisting in weight loss.  She had been on Ozempic but insurance was no longer providing it with enough coverage to make it affordable so had to stop taking it before it was fully titrated.                 BMI:   Estimated body mass index is 40.74 kg/m  as calculated from the following:    Height as of 4/12/23: 1.657 m (5' 5.25\").    Weight as of this encounter: 111.9 kg (246 lb 11.2 oz).           Sonia Quinones MD  [facility]    Angel Ruiz is a 27 year old, presenting for the following health issues:  Rash (Pt c/o poss ring worm x 2 weeks on inner upper R thigh. )        4/12/2023    12:55 PM   Additional Questions   Roomed by Jessi     History of Present Illness       Reason for visit:  Skin issue  Symptom onset:  More than a month  Symptoms include:  Red scaly patch witg raised ring surrounding  Symptom intensity:  Mild  Symptom progression:  Staying the same  Had these symptoms before:  Yes  Has tried/received treatment for these symptoms:  No    She eats 2-3 servings of fruits and vegetables daily.She consumes 0 sweetened beverage(s) daily.She exercises with enough effort to increase her heart rate 20 to 29 minutes per day.  She exercises with enough effort to increase her heart rate 4 days per week. She is missing 1 dose(s) of medications per week.  She is not taking prescribed medications regularly due to cost of medication.    Today's PHQ-9         PHQ-9 Total Score: 7    PHQ-9 Q9 Thoughts of better off dead/self-harm past 2 weeks :   Not at all    How difficult have these problems made it for you to do your work, take care of things at home, or get along with other people: Somewhat difficult               Review of Systems         Objective    /60 (BP Location: Right arm, Patient Position: Sitting)   Pulse 88   Resp 16   Wt 111.9 kg (246 lb 11.2 oz)   SpO2 97%   BMI 40.74 kg/m  "   Body mass index is 40.74 kg/m .  Physical Exam

## 2023-06-20 LAB
GLIADIN IGA SER-ACNC: 0.7 U/ML
GLIADIN IGG SER-ACNC: <0.6 U/ML
IGA SERPL-MCNC: 392 MG/DL (ref 84–499)
TTG IGA SER-ACNC: 0.5 U/ML
TTG IGG SER-ACNC: <0.6 U/ML

## 2023-06-21 LAB
DQA1* LOCUS: NORMAL
DQA1*: NORMAL
DQB1* LOCUS: NORMAL
DQB1*: NORMAL
DRSSO TEST METHOD: NORMAL
ENDOMYSIUM IGA TITR SER IF: NORMAL {TITER}
ZZZDRSSO COMMENTS: NORMAL

## 2023-07-06 ENCOUNTER — MYC MEDICAL ADVICE (OUTPATIENT)
Dept: FAMILY MEDICINE | Facility: CLINIC | Age: 28
End: 2023-07-06
Payer: COMMERCIAL

## 2023-07-06 DIAGNOSIS — Z15.89 HLA-DQB1*02:02 ALLELE POSITIVE: Primary | ICD-10-CM

## 2023-07-10 NOTE — TELEPHONE ENCOUNTER
Referral placed to gi  to Manuel Walters at address [address], [place] [phone] per patient request.

## 2023-07-11 NOTE — TELEPHONE ENCOUNTER
"See MyChart from Patient needing PCP review.  Please respond directly to patient, if at all able.    Patient would like a new GI referral for \"[facility]\"    MEHRDAD Jang  [facility]  "

## 2023-07-18 ENCOUNTER — TELEPHONE (OUTPATIENT)
Dept: FAMILY MEDICINE | Facility: CLINIC | Age: 28
End: 2023-07-18
Payer: COMMERCIAL

## 2023-07-18 NOTE — TELEPHONE ENCOUNTER
Spoke to pt. She has an appt tomorrow morning at a different [facility]. If for some reason they are unable to order test, she will reach out to us again and message will have to go to covering provider to review.     I did verify and she does not need any paperwork filled out/signed for this position.

## 2023-07-18 NOTE — TELEPHONE ENCOUNTER
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  Pt is wondering if it can be ordered to [facility] since it is a very convenient location for her.    Does caller want a call/response back: Pt asked that we call her and let her know if we can order this for her and where the order is sent to.

## 2023-07-19 ENCOUNTER — OFFICE VISIT (OUTPATIENT)
Dept: FAMILY MEDICINE | Facility: CLINIC | Age: 28
End: 2023-07-19
Payer: COMMERCIAL

## 2023-07-19 VITALS
DIASTOLIC BLOOD PRESSURE: 73 MMHG | HEIGHT: 66 IN | BODY MASS INDEX: 38.89 KG/M2 | HEART RATE: 100 BPM | TEMPERATURE: 97.5 F | SYSTOLIC BLOOD PRESSURE: 105 MMHG | OXYGEN SATURATION: 96 % | WEIGHT: 242 LBS | RESPIRATION RATE: 20 BRPM

## 2023-07-19 DIAGNOSIS — Z11.1 SCREENING EXAMINATION FOR PULMONARY TUBERCULOSIS: Primary | ICD-10-CM

## 2023-07-19 PROCEDURE — 99213 OFFICE O/P EST LOW 20 MIN: CPT | Performed by: FAMILY MEDICINE

## 2023-07-19 ASSESSMENT — ENCOUNTER SYMPTOMS
PALPITATIONS: 0
COUGH: 0
HEMATURIA: 0
DIARRHEA: 1
NAUSEA: 1
HEADACHES: 0
HEARTBURN: 0
ARTHRALGIAS: 0
CONSTIPATION: 0
NERVOUS/ANXIOUS: 0
CHILLS: 0
SHORTNESS OF BREATH: 0
WEAKNESS: 0
EYE PAIN: 0
JOINT SWELLING: 0
SORE THROAT: 0
PARESTHESIAS: 0
DYSURIA: 0
DIZZINESS: 0
ABDOMINAL PAIN: 1
FEVER: 0
MYALGIAS: 0
HEMATOCHEZIA: 0
FREQUENCY: 0

## 2023-07-19 ASSESSMENT — ASTHMA QUESTIONNAIRES: ACT_TOTALSCORE: 22

## 2023-07-19 NOTE — PROGRESS NOTES
"  Assessment & Plan     Screening examination for pulmonary tuberculosis  -No TB exposure or symptoms.  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  No hx of TB exposure.   No respiratory symptoms.    Review of Systems         Objective    /73 (BP Location: Right arm, Patient Position: Sitting, Cuff Size: Adult Large)   Pulse 100   Temp 97.5  F (36.4  C) (Temporal)   Resp 20   Ht 1.67 m (5' 5.75\")   Wt 109.8 kg (242 lb)   LMP 07/03/2023   SpO2 96%   BMI 39.36 kg/m    Body mass index is 39.36 kg/m .  Physical Exam   GENERAL: healthy, alert and no distress  RESP: breathing comfortably, no acute respiratory distress  PSYCH: mentation appears normal, affect normal/bright                    "

## 2023-07-20 ENCOUNTER — LAB (OUTPATIENT)
Dept: LAB | Facility: CLINIC | Age: 28
End: 2023-07-20
Payer: COMMERCIAL

## 2023-07-20 DIAGNOSIS — F33.2 MAJOR DEPRESSIVE DISORDER, RECURRENT, SEVERE WITHOUT PSYCHOTIC BEHAVIOR (H): ICD-10-CM

## 2023-07-20 DIAGNOSIS — F41.1 GENERALIZED ANXIETY DISORDER: ICD-10-CM

## 2023-07-20 DIAGNOSIS — Z11.1 SCREENING EXAMINATION FOR PULMONARY TUBERCULOSIS: Primary | ICD-10-CM

## 2023-07-20 DIAGNOSIS — E66.812 CLASS 2 OBESITY DUE TO EXCESS CALORIES WITHOUT SERIOUS COMORBIDITY WITH BODY MASS INDEX (BMI) OF 39.0 TO 39.9 IN ADULT: ICD-10-CM

## 2023-07-20 DIAGNOSIS — R45.851 SUICIDAL IDEATION: ICD-10-CM

## 2023-07-20 DIAGNOSIS — E66.09 CLASS 2 OBESITY DUE TO EXCESS CALORIES WITHOUT SERIOUS COMORBIDITY WITH BODY MASS INDEX (BMI) OF 39.0 TO 39.9 IN ADULT: ICD-10-CM

## 2023-07-20 DIAGNOSIS — F43.10 POSTTRAUMATIC STRESS DISORDER: ICD-10-CM

## 2023-07-20 PROCEDURE — 86481 TB AG RESPONSE T-CELL SUSP: CPT

## 2023-07-20 PROCEDURE — 36415 COLL VENOUS BLD VENIPUNCTURE: CPT

## 2023-07-21 LAB
QUANTIFERON MITOGEN: 10 IU/ML
QUANTIFERON NIL TUBE: 0.1 IU/ML
QUANTIFERON TB1 TUBE: 0.1 IU/ML
QUANTIFERON TB2 TUBE: 0.09

## 2023-07-21 RX ORDER — SERTRALINE HYDROCHLORIDE 100 MG/1
150 TABLET, FILM COATED ORAL DAILY
Qty: 135 TABLET | Refills: 0 | Status: SHIPPED | OUTPATIENT
Start: 2023-07-21 | End: 2024-01-08

## 2023-07-21 RX ORDER — BUPROPION HYDROCHLORIDE 300 MG/1
TABLET ORAL
Qty: 90 TABLET | Refills: 0 | Status: SHIPPED | OUTPATIENT
Start: 2023-07-21 | End: 2024-01-08

## 2023-07-21 NOTE — TELEPHONE ENCOUNTER
"Routing refill request to provider for review/approval because:  PHQ9 out of range  Break in medication    buPROPion (WELLBUTRIN XL) 300 MG 24 hr tablet   Last Written Prescription Date:  4/12/23  Last Fill Quantity: 60,  # refills: 0     Sertraline (Zoloft) 100 mg tab  Last Written Prescription Date:  4/12/23  Last Fill Quantity: 90,  # refills: 0   Last office visit provider:  6/19/23     Requested Prescriptions   Pending Prescriptions Disp Refills    buPROPion (WELLBUTRIN XL) 300 MG 24 hr tablet [Pharmacy Med Name: buPROPion HCl ER (XL) 300 MG Oral Tablet Extended Release 24 Hour] 60 tablet 0     Sig: TAKE 1 TABLET BY MOUTH IN THE MORNING       SSRIs Protocol Failed - 7/20/2023  8:31 PM        Failed - PHQ-9 score less than 5 in past 6 months     Please review last PHQ-9 score.           Passed - Medication is Bupropion     If the medication is Bupropion (Wellbutrin), and the patient is taking for smoking cessation; OK to refill.          Passed - Medication is active on med list        Passed - Patient is age 18 or older        Passed - No active pregnancy on record        Passed - No positive pregnancy test in last 12 months        Passed - Recent (6 mo) or future (30 days) visit within the authorizing provider's specialty     Patient had office visit in the last 6 months or has a visit in the next 30 days with authorizing provider or within the authorizing provider's specialty.  See \"Patient Info\" tab in inbasket, or \"Choose Columns\" in Meds & Orders section of the refill encounter.              sertraline (ZOLOFT) 100 MG tablet [Pharmacy Med Name: Sertraline HCl 100 MG Oral Tablet] 90 tablet 0     Sig: TAKE 1 & 1/2 (ONE & ONE-HALF) TABLETS BY MOUTH ONCE DAILY       SSRIs Protocol Failed - 7/20/2023  8:31 PM        Failed - PHQ-9 score less than 5 in past 6 months     Please review last PHQ-9 score.           Passed - Medication is Bupropion     If the medication is Bupropion (Wellbutrin), and the patient is " "taking for smoking cessation; OK to refill.          Passed - Medication is active on med list        Passed - Patient is age 18 or older        Passed - No active pregnancy on record        Passed - No positive pregnancy test in last 12 months        Passed - Recent (6 mo) or future (30 days) visit within the authorizing provider's specialty     Patient had office visit in the last 6 months or has a visit in the next 30 days with authorizing provider or within the authorizing provider's specialty.  See \"Patient Info\" tab in inbasket, or \"Choose Columns\" in Meds & Orders section of the refill encounter.                 Pearl Grimm RN 07/21/23 8:40 AM  "

## 2023-07-23 LAB
GAMMA INTERFERON BACKGROUND BLD IA-ACNC: 0.1 IU/ML
M TB IFN-G BLD-IMP: NEGATIVE
M TB IFN-G CD4+ BCKGRND COR BLD-ACNC: 9.9 IU/ML
MITOGEN IGNF BCKGRD COR BLD-ACNC: -0.01 IU/ML
MITOGEN IGNF BCKGRD COR BLD-ACNC: 0 IU/ML

## 2023-07-25 ENCOUNTER — OFFICE VISIT (OUTPATIENT)
Dept: FAMILY MEDICINE | Facility: CLINIC | Age: 28
End: 2023-07-25
Payer: COMMERCIAL

## 2023-07-25 VITALS
TEMPERATURE: 98.1 F | WEIGHT: 237.7 LBS | DIASTOLIC BLOOD PRESSURE: 74 MMHG | RESPIRATION RATE: 20 BRPM | HEART RATE: 85 BPM | SYSTOLIC BLOOD PRESSURE: 106 MMHG | OXYGEN SATURATION: 97 % | BODY MASS INDEX: 38.66 KG/M2

## 2023-07-25 DIAGNOSIS — R30.0 DYSURIA: ICD-10-CM

## 2023-07-25 DIAGNOSIS — R35.0 URINE FREQUENCY: Primary | ICD-10-CM

## 2023-07-25 DIAGNOSIS — N34.2 URETHRITIS: ICD-10-CM

## 2023-07-25 LAB
ALBUMIN UR-MCNC: NEGATIVE MG/DL
APPEARANCE UR: CLEAR
BILIRUB UR QL STRIP: NEGATIVE
CLUE CELLS: ABNORMAL
COLOR UR AUTO: YELLOW
GLUCOSE UR STRIP-MCNC: NEGATIVE MG/DL
HGB UR QL STRIP: ABNORMAL
KETONES UR STRIP-MCNC: NEGATIVE MG/DL
LEUKOCYTE ESTERASE UR QL STRIP: ABNORMAL
NITRATE UR QL: NEGATIVE
PH UR STRIP: 7 [PH] (ref 5–8)
RBC #/AREA URNS AUTO: ABNORMAL /HPF
SP GR UR STRIP: 1.01 (ref 1–1.03)
TRICHOMONAS, WET PREP: ABNORMAL
UROBILINOGEN UR STRIP-ACNC: 0.2 E.U./DL
WBC #/AREA URNS AUTO: ABNORMAL /HPF
WBC'S/HIGH POWER FIELD, WET PREP: ABNORMAL
YEAST, WET PREP: ABNORMAL

## 2023-07-25 PROCEDURE — 87210 SMEAR WET MOUNT SALINE/INK: CPT | Performed by: NURSE PRACTITIONER

## 2023-07-25 PROCEDURE — 87491 CHLMYD TRACH DNA AMP PROBE: CPT | Performed by: NURSE PRACTITIONER

## 2023-07-25 PROCEDURE — 81001 URINALYSIS AUTO W/SCOPE: CPT | Performed by: NURSE PRACTITIONER

## 2023-07-25 PROCEDURE — 99214 OFFICE O/P EST MOD 30 MIN: CPT | Performed by: NURSE PRACTITIONER

## 2023-07-25 PROCEDURE — 87591 N.GONORRHOEAE DNA AMP PROB: CPT | Performed by: NURSE PRACTITIONER

## 2023-07-25 RX ORDER — PHENAZOPYRIDINE HYDROCHLORIDE 100 MG/1
100 TABLET, FILM COATED ORAL 3 TIMES DAILY PRN
Qty: 10 TABLET | Refills: 0 | Status: SHIPPED | OUTPATIENT
Start: 2023-07-25 | End: 2024-04-01

## 2023-07-25 ASSESSMENT — PATIENT HEALTH QUESTIONNAIRE - PHQ9
SUM OF ALL RESPONSES TO PHQ QUESTIONS 1-9: 6
SUM OF ALL RESPONSES TO PHQ QUESTIONS 1-9: 6
10. IF YOU CHECKED OFF ANY PROBLEMS, HOW DIFFICULT HAVE THESE PROBLEMS MADE IT FOR YOU TO DO YOUR WORK, TAKE CARE OF THINGS AT HOME, OR GET ALONG WITH OTHER PEOPLE: SOMEWHAT DIFFICULT

## 2023-07-25 NOTE — PROGRESS NOTES
"  Assessment & Plan     Urine frequency  UA normal not consistent with UTI.  Blood present in urine patient is not currently on menstrual period.  - UA Macroscopic with reflex to Microscopic and Culture - Clinic Collect  - UA Microscopic with Reflex to Culture    Dysuria  Discussed symptoms could be consistent with kidney stones specially with blood in the urine and negative UA for infection.  Would recommend abdominal CT to evaluate further.    Continue to push fluids.    Wet prep is negative for yeast BV and trichomonas.    - CT Abdomen Pelvis w/o Contrast; Future  - NEISSERIA GONORRHOEA PCR; Future  - CHLAMYDIA TRACHOMATIS PCR; Future  - Wet prep - Clinic Collect      Urethritis  Unclear cause of this pain for patient.  Can take Pyridium as needed for about 3 days however would not recommend doing much more than that as to not mask symptoms.  If the rest of the lab work comes back normal referral to urology for further evaluation of this pain patient is having.  - phenazopyridine (PYRIDIUM) 100 MG tablet; Take 1 tablet (100 mg) by mouth 3 times daily as needed for urinary tract discomfort             BMI:   Estimated body mass index is 38.66 kg/m  as calculated from the following:    Height as of 7/19/23: 1.67 m (5' 5.75\").    Weight as of this encounter: 107.8 kg (237 lb 11.2 oz).           ERIN JEFFERS [facility]   Sara is a 27 year old, presenting for the following health issues:  UTI      7/25/2023    11:07 AM   Additional Questions   Roomed by LC-LPN   Accompanied by NA         7/25/2023    11:07 AM   Patient Reported Additional Medications   Patient reports taking the following new medications NA     History of Present Illness       Reason for visit:  Uti symptoms  Symptom onset:  1-3 days ago  Symptoms include:  Urgency of urination, burning on urination, feeling a constant need to urinate  Symptom intensity:  Moderate  Symptom progression:  " Worsening  Had these symptoms before:  Yes  Has tried/received treatment for these symptoms:  Yes  Previous treatment was successful:  Yes  Prior treatment description:  Antibiotic i believe  What makes it worse:  No  What makes it better:  Cranberry juice, but this has gone beyond the point of me being able to correct it on my own    She eats 2-3 servings of fruits and vegetables daily.She consumes 1 sweetened beverage(s) daily.She exercises with enough effort to increase her heart rate 30 to 60 minutes per day.  She exercises with enough effort to increase her heart rate 4 days per week. She is missing 1 dose(s) of medications per week.  She is not taking prescribed medications regularly due to other.     Hard to sleep due to urgency. Denies vaginal concerns. Denies concerns for sexually transmitted infection.  When done urinating Is burning and hurts a lot .  Feels burning throughout whole body.  Does endorse some back pain.          Review of Systems         Objective    /74 (BP Location: Right arm, Patient Position: Sitting, Cuff Size: Adult Large)   Pulse 85   Temp 98.1  F (36.7  C) (Oral)   Resp 20   Wt 107.8 kg (237 lb 11.2 oz)   LMP 07/03/2023   SpO2 97%   BMI 38.66 kg/m    Body mass index is 38.66 kg/m .  Physical Exam  Constitutional:       Appearance: Normal appearance.   Abdominal:      General: Abdomen is flat.      Tenderness: There is no abdominal tenderness. There is no right CVA tenderness or left CVA tenderness.   Genitourinary:     Comments: External erythema.  No discharge present no tenderness  Neurological:      General: No focal deficit present.      Mental Status: She is alert and oriented to person, place, and time.   Psychiatric:         Mood and Affect: Mood normal.         Behavior: Behavior normal.

## 2023-07-26 LAB
C TRACH DNA SPEC QL NAA+PROBE: NEGATIVE
N GONORRHOEA DNA SPEC QL NAA+PROBE: NEGATIVE

## 2023-08-08 ENCOUNTER — APPOINTMENT (OUTPATIENT)
Dept: ULTRASOUND IMAGING | Facility: CLINIC | Age: 28
End: 2023-08-08
Attending: EMERGENCY MEDICINE
Payer: COMMERCIAL

## 2023-08-08 ENCOUNTER — HOSPITAL ENCOUNTER (EMERGENCY)
Facility: CLINIC | Age: 28
Discharge: HOME | End: 2023-08-08
Attending: EMERGENCY MEDICINE | Admitting: EMERGENCY MEDICINE
Payer: COMMERCIAL

## 2023-08-08 VITALS
OXYGEN SATURATION: 97 % | SYSTOLIC BLOOD PRESSURE: 119 MMHG | HEART RATE: 64 BPM | DIASTOLIC BLOOD PRESSURE: 77 MMHG | RESPIRATION RATE: 18 BRPM

## 2023-08-08 DIAGNOSIS — R10.10 UPPER ABDOMINAL PAIN: ICD-10-CM

## 2023-08-08 LAB
ALBUMIN SERPL-MCNC: 3.8 G/DL (ref 3.5–5)
ALBUMIN UR-MCNC: NEGATIVE MG/DL
ALP SERPL-CCNC: 94 U/L (ref 45–120)
ALT SERPL W P-5'-P-CCNC: 14 U/L (ref 0–45)
ANION GAP SERPL CALCULATED.3IONS-SCNC: 9 MMOL/L (ref 5–18)
APPEARANCE UR: CLEAR
AST SERPL W P-5'-P-CCNC: 12 U/L (ref 0–40)
BACTERIA #/AREA URNS HPF: ABNORMAL /HPF
BASOPHILS # BLD AUTO: 0.1 10E3/UL (ref 0–0.2)
BASOPHILS NFR BLD AUTO: 1 %
BILIRUB DIRECT SERPL-MCNC: <0.1 MG/DL
BILIRUB SERPL-MCNC: <0.1 MG/DL (ref 0–1)
BILIRUB UR QL STRIP: NEGATIVE
BUN SERPL-MCNC: 13 MG/DL (ref 8–22)
C REACTIVE PROTEIN LHE: 1.1 MG/DL (ref 0–?)
CALCIUM SERPL-MCNC: 9.1 MG/DL (ref 8.5–10.5)
CHLORIDE BLD-SCNC: 106 MMOL/L (ref 98–107)
CO2 SERPL-SCNC: 23 MMOL/L (ref 22–31)
COLOR UR AUTO: ABNORMAL
CREAT SERPL-MCNC: 0.83 MG/DL (ref 0.6–1.1)
EOSINOPHIL # BLD AUTO: 0.2 10E3/UL (ref 0–0.7)
EOSINOPHIL NFR BLD AUTO: 3 %
ERYTHROCYTE [DISTWIDTH] IN BLOOD BY AUTOMATED COUNT: 16.5 % (ref 10–15)
GFR SERPL CREATININE-BSD FRML MDRD: >90 ML/MIN/1.73M2
GLUCOSE BLD-MCNC: 110 MG/DL (ref 70–125)
GLUCOSE UR STRIP-MCNC: NEGATIVE MG/DL
HCG SERPL QL: NEGATIVE
HCT VFR BLD AUTO: 37.2 % (ref 35–47)
HGB BLD-MCNC: 11.9 G/DL (ref 11.7–15.7)
HGB UR QL STRIP: NEGATIVE
HOLD SPECIMEN: NORMAL
IMM GRANULOCYTES # BLD: 0 10E3/UL
IMM GRANULOCYTES NFR BLD: 0 %
KETONES UR STRIP-MCNC: NEGATIVE MG/DL
LEUKOCYTE ESTERASE UR QL STRIP: ABNORMAL
LIPASE SERPL-CCNC: 22 U/L (ref 0–52)
LYMPHOCYTES # BLD AUTO: 3.4 10E3/UL (ref 0.8–5.3)
LYMPHOCYTES NFR BLD AUTO: 40 %
MCH RBC QN AUTO: 24.2 PG (ref 26.5–33)
MCHC RBC AUTO-ENTMCNC: 32 G/DL (ref 31.5–36.5)
MCV RBC AUTO: 76 FL (ref 78–100)
MONOCYTES # BLD AUTO: 0.6 10E3/UL (ref 0–1.3)
MONOCYTES NFR BLD AUTO: 7 %
MUCOUS THREADS #/AREA URNS LPF: PRESENT /LPF
NEUTROPHILS # BLD AUTO: 4.2 10E3/UL (ref 1.6–8.3)
NEUTROPHILS NFR BLD AUTO: 49 %
NITRATE UR QL: NEGATIVE
NRBC # BLD AUTO: 0 10E3/UL
NRBC BLD AUTO-RTO: 0 /100
PH UR STRIP: 5.5 [PH] (ref 5–7)
PLATELET # BLD AUTO: 532 10E3/UL (ref 150–450)
POTASSIUM BLD-SCNC: 4.3 MMOL/L (ref 3.5–5)
PROT SERPL-MCNC: 7.8 G/DL (ref 6–8)
RBC # BLD AUTO: 4.92 10E6/UL (ref 3.8–5.2)
RBC URINE: 0 /HPF
SODIUM SERPL-SCNC: 138 MMOL/L (ref 136–145)
SP GR UR STRIP: 1.02 (ref 1–1.03)
SQUAMOUS EPITHELIAL: 6 /HPF
UROBILINOGEN UR STRIP-MCNC: <2 MG/DL
WBC # BLD AUTO: 8.6 10E3/UL (ref 4–11)
WBC URINE: 21 /HPF

## 2023-08-08 PROCEDURE — 87086 URINE CULTURE/COLONY COUNT: CPT | Performed by: EMERGENCY MEDICINE

## 2023-08-08 PROCEDURE — 81001 URINALYSIS AUTO W/SCOPE: CPT | Performed by: EMERGENCY MEDICINE

## 2023-08-08 PROCEDURE — 82248 BILIRUBIN DIRECT: CPT | Performed by: EMERGENCY MEDICINE

## 2023-08-08 PROCEDURE — 76705 ECHO EXAM OF ABDOMEN: CPT

## 2023-08-08 PROCEDURE — 86140 C-REACTIVE PROTEIN: CPT | Performed by: EMERGENCY MEDICINE

## 2023-08-08 PROCEDURE — 96375 TX/PRO/DX INJ NEW DRUG ADDON: CPT

## 2023-08-08 PROCEDURE — 36415 COLL VENOUS BLD VENIPUNCTURE: CPT | Performed by: EMERGENCY MEDICINE

## 2023-08-08 PROCEDURE — 258N000003 HC RX IP 258 OP 636: Performed by: EMERGENCY MEDICINE

## 2023-08-08 PROCEDURE — 85025 COMPLETE CBC W/AUTO DIFF WBC: CPT | Performed by: EMERGENCY MEDICINE

## 2023-08-08 PROCEDURE — 83690 ASSAY OF LIPASE: CPT | Performed by: EMERGENCY MEDICINE

## 2023-08-08 PROCEDURE — 80053 COMPREHEN METABOLIC PANEL: CPT | Performed by: EMERGENCY MEDICINE

## 2023-08-08 PROCEDURE — 96361 HYDRATE IV INFUSION ADD-ON: CPT

## 2023-08-08 PROCEDURE — 99285 EMERGENCY DEPT VISIT HI MDM: CPT | Mod: 25

## 2023-08-08 PROCEDURE — 96374 THER/PROPH/DIAG INJ IV PUSH: CPT

## 2023-08-08 PROCEDURE — 84703 CHORIONIC GONADOTROPIN ASSAY: CPT | Performed by: EMERGENCY MEDICINE

## 2023-08-08 PROCEDURE — 250N000013 HC RX MED GY IP 250 OP 250 PS 637: Performed by: EMERGENCY MEDICINE

## 2023-08-08 PROCEDURE — 250N000011 HC RX IP 250 OP 636: Mod: JZ | Performed by: EMERGENCY MEDICINE

## 2023-08-08 RX ORDER — SUCRALFATE 1 G/1
1 TABLET ORAL 4 TIMES DAILY
Qty: 60 TABLET | Refills: 0 | Status: SHIPPED | OUTPATIENT
Start: 2023-08-08 | End: 2024-04-01

## 2023-08-08 RX ORDER — ONDANSETRON 2 MG/ML
4 INJECTION INTRAMUSCULAR; INTRAVENOUS ONCE
Status: COMPLETED | OUTPATIENT
Start: 2023-08-08 | End: 2023-08-08

## 2023-08-08 RX ORDER — MORPHINE SULFATE 4 MG/ML
4 INJECTION, SOLUTION INTRAMUSCULAR; INTRAVENOUS ONCE
Status: COMPLETED | OUTPATIENT
Start: 2023-08-08 | End: 2023-08-08

## 2023-08-08 RX ORDER — FAMOTIDINE 20 MG/1
20 TABLET, FILM COATED ORAL 2 TIMES DAILY
Qty: 30 TABLET | Refills: 0 | Status: SHIPPED | OUTPATIENT
Start: 2023-08-08 | End: 2024-04-01

## 2023-08-08 RX ORDER — DICYCLOMINE HCL 20 MG
20 TABLET ORAL 4 TIMES DAILY PRN
Qty: 20 TABLET | Refills: 0 | Status: SHIPPED | OUTPATIENT
Start: 2023-08-08

## 2023-08-08 RX ORDER — DICYCLOMINE HYDROCHLORIDE 10 MG/1
20 CAPSULE ORAL ONCE
Status: COMPLETED | OUTPATIENT
Start: 2023-08-08 | End: 2023-08-08

## 2023-08-08 RX ORDER — ONDANSETRON 4 MG/1
4 TABLET, ORALLY DISINTEGRATING ORAL EVERY 8 HOURS PRN
Qty: 20 TABLET | Refills: 0 | Status: SHIPPED | OUTPATIENT
Start: 2023-08-08 | End: 2024-04-01

## 2023-08-08 RX ORDER — MAGNESIUM HYDROXIDE/ALUMINUM HYDROXICE/SIMETHICONE 120; 1200; 1200 MG/30ML; MG/30ML; MG/30ML
15 SUSPENSION ORAL ONCE
Status: COMPLETED | OUTPATIENT
Start: 2023-08-08 | End: 2023-08-08

## 2023-08-08 RX ADMIN — ALUMINUM HYDROXIDE, MAGNESIUM HYDROXIDE, AND DIMETHICONE 15 ML: 200; 20; 200 SUSPENSION ORAL at 06:22

## 2023-08-08 RX ADMIN — ONDANSETRON 4 MG: 2 INJECTION INTRAMUSCULAR; INTRAVENOUS at 06:23

## 2023-08-08 RX ADMIN — MORPHINE SULFATE 4 MG: 4 INJECTION, SOLUTION INTRAMUSCULAR; INTRAVENOUS at 06:27

## 2023-08-08 RX ADMIN — DICYCLOMINE HYDROCHLORIDE 20 MG: 10 CAPSULE ORAL at 08:06

## 2023-08-08 RX ADMIN — FAMOTIDINE 20 MG: 10 INJECTION, SOLUTION INTRAVENOUS at 06:23

## 2023-08-08 RX ADMIN — SODIUM CHLORIDE, POTASSIUM CHLORIDE, SODIUM LACTATE AND CALCIUM CHLORIDE 1000 ML: 600; 310; 30; 20 INJECTION, SOLUTION INTRAVENOUS at 06:26

## 2023-08-08 ASSESSMENT — ACTIVITIES OF DAILY LIVING (ADL): ADLS_ACUITY_SCORE: 35

## 2023-08-08 NOTE — DISCHARGE INSTRUCTIONS
Take the Pepcid & Carafate as prescribed. These help with acid-related pain. You can also try over-the-counter Tums & Maalox for this.    As needed for pain control at home, take:  - over-the-counter ibuprofen 400mg by mouth every 6 hours (max dose 2400mg in 24 hours)  - over-the-counter acetaminophen 1g by mouth every 6 hours (max dose 4g in 24 hours)  - prescribed Bentyl for abdominal pain    Use the Zofran for any nausea or vomiting.    Follow up with your Primary Care provider in 2 days for a recheck.    Return to the Emergency Department for any persistent vomiting, severe worsening, or any other concerns.

## 2023-08-08 NOTE — ED TRIAGE NOTES
Pt reports abdominal pain upper bilaterally. The pain gets worse when laying down.      Triage Assessment       Row Name 08/08/23 0539       Triage Assessment (Adult)    Airway WDL WDL       Respiratory WDL    Respiratory WDL WDL       Skin Circulation/Temperature WDL    Skin Circulation/Temperature WDL WDL       Cardiac WDL    Cardiac WDL WDL       Peripheral/Neurovascular WDL    Peripheral Neurovascular WDL WDL       Cognitive/Neuro/Behavioral WDL    Cognitive/Neuro/Behavioral WDL WDL

## 2023-08-08 NOTE — ED PROVIDER NOTES
EMERGENCY DEPARTMENT ENCOUNTER      NAME: Fabian Springer  AGE: 28 year old female  YOB: 1995  MRN: [id number]  EVALUATION DATE & TIME: 8/8/2023  5:41 AM    PCP: Sonia Quinones    ED PROVIDER: Cristobal Yoon M.D.      Chief Complaint   Patient presents with    Abdominal Pain         IMPRESSION  1. Upper abdominal pain        PLAN  - Pepcid, Carafate, Zofran, Bentyl for home  - close PCP follow up  - discharge to home    ED COURSE & MEDICAL DECISION MAKING    ED Course as of 08/08/23 0843   Tue Aug 08, 2023   0603 28yoF with history of asthma, obesity, depression, anxiety, PTSD, no prior abdominal surgeries presenting with her girlfriend for evaluation of abdominal pain. Reports started 2 days ago with nausea, then overnight awoke with crampy upper abdominal pain----lasted about 3 hours and was able to sleep. Yesterday pain intermittent and ongoing throughout the day; not particularly worse with eating. Reports mild bloodless diarrhea but states this is normal for her. Notes she is currently being worked up for celiac disease by GI---states she has not yet had a biopsy so was told to keep eating gluten in the meantime----reports she does have increased pain after eating pasta. Came this morning for ongoing pain.    SBP 140s on presentation with otherwise normal vitals. Appears in pain on exam but not distress with clear lungs, normal work of breathing, mild upper abdominal tenderness worse to RUQ with negative Hobbs's and no peritoneal signs, no CT-scan of the abdomen, no peripheral edema, normal neuro exam.    No concern clinically for acute aortic syndrome. Top differential to this point is gastritis, PUD, pancreatitis, acute biliary process, celiac disease. Will obtain US, blood, urine while giving meds for symptoms. Patient comfortable with this plan; no further questions at this time.   0725 Blood tests reassuring with normal bilirubin/LFTs/lipase, no MAGED, no glaring electrolyte  abnormality, no leukocytosis with CRP just 1.1, no anemia, negative pregnancy. UA contaminated sample with no clear UTI. US pending at this time.   0759 US unremarkable with no gallstones, normal CBD, no suggestion of cholecystitis. Patient feeling improved on recheck; no abdominal tenderness on exam. Ok for outpatient management. More suspect celiac / bowel spasm given history but gastritis/PUD a possibility----will treat for both as she obtains outpatient follow up. Patient comfortable with this plan and discharge home at this time. Return precautions and need for PCP & GI follow up discussed and understood. No further questions at the time of discharge.       --------------------------------------------------------------------------------   --------------------------------------------------------------------------------     6:05 AM I met with the patient for the initial interview and physical examination. Discussed plan for treatment and workup in the ED.    7:54 AM I updated and rechecked patient. Discharge discussed.    This patient involved a high degree of complexity in medical decision making, as significant risks were present and assessed. Recent encounters & results in medical record reviewed by me.    All workup (i.e. any EKG/labs/imaging as per charting below) reviewed and independently interpreted by me. See respective sections for details.    Broad differential considered for this patient, including but not limited to:  pancreatitis, acute biliary process (e.g. symptomatic cholelithiasis, cholecystitis, choledocholithiasis, cholangitis), gastritis, PUD, perforated viscus, intraabdominal abscess, celiac disease, IBD, diverticulitis, colitis, appendicitis, omental infarction, epiploic appendagitis, mesenteric ischemia, SBO, IBS, bowel spasm, UTI, pyelo pregnancy issue, acute aortic syndrome      See additional MDM below if interested.      MEDICATIONS GIVEN IN THE EMERGENCY DEPARTMENT  Medications    famotidine (PEPCID) injection 20 mg (20 mg Intravenous $Given 8/8/23 0623)   alum & mag hydroxide-simethicone (MAALOX) suspension 15 mL (15 mLs Oral $Given 8/8/23 0622)   morphine (PF) injection 4 mg (4 mg Intravenous $Given 8/8/23 0627)   ondansetron (ZOFRAN) injection 4 mg (4 mg Intravenous $Given 8/8/23 0623)   lactated ringers BOLUS 1,000 mL (0 mLs Intravenous Stopped 8/8/23 0715)   dicyclomine (BENTYL) capsule 20 mg (20 mg Oral $Given 8/8/23 0806)       NEW PRESCRIPTIONS STARTED AT TODAY'S ER VISIT  Discharge Medication List as of 8/8/2023  8:07 AM        START taking these medications    Details   dicyclomine (BENTYL) 20 MG tablet Take 1 tablet (20 mg) by mouth 4 times daily as needed (abdominal pain), Disp-20 tablet, R-0, E-Prescribe      famotidine (PEPCID) 20 MG tablet Take 1 tablet (20 mg) by mouth 2 times daily, Disp-30 tablet, R-0, E-Prescribe      ondansetron (ZOFRAN ODT) 4 MG ODT tab Take 1 tablet (4 mg) by mouth every 8 hours as needed for nausea, Disp-20 tablet, R-0, E-Prescribe      sucralfate (CARAFATE) 1 GM tablet Take 1 tablet (1 g) by mouth 4 times daily, Disp-60 tablet, R-0, E-Prescribe           CONTINUE these medications which have NOT CHANGED    Details   albuterol (PROAIR HFA/PROVENTIL HFA/VENTOLIN HFA) 108 (90 Base) MCG/ACT inhaler Inhale 2 puffs into the lungs every 6 hours as needed for shortness of breath / dyspnea or wheezing, Disp-8 g, R-3, E-PrescribePharmacy may dispense brand covered by insurance (Proair, or proventil or ventolin or generic albuterol inhaler)      albuterol (PROVENTIL) (2.5 MG/3ML) 0.083% neb solution Take 1 vial (2.5 mg) by nebulization every 6 hours as needed for shortness of breath / dyspnea or wheezing, Disp-60 mL, R-3, E-Prescribe      !! buPROPion (WELLBUTRIN XL) 150 MG 24 hr tablet Take 1 tablet (150 mg) by mouth every morning, Disp-30 tablet, R-1, E-Prescribe      !! buPROPion (WELLBUTRIN XL) 300 MG 24 hr tablet TAKE 1 TABLET BY MOUTH IN THE MORNING,  Disp-90 tablet, R-0, E-Prescribe      cetirizine (ZYRTEC) 10 MG tablet Take 10 mg by mouth daily as needed for allergies, Historical      clotrimazole (LOTRIMIN) 1 % external cream Apply topically 2 times dailyDisp-30 g, [id number]      fluticasone (FLOVENT HFA) 220 MCG/ACT inhaler Inhale 2 puffs into the lungs 2 times daily For asthma exacerbation or vocal chord dysfunction., Historical      hydrOXYzine (ATARAX) 25 MG tablet Take 2 tablets (50 mg) by mouth every 4 hours as needed for anxiety, Disp-45 tablet, R-11, E-Prescribe      ibuprofen (ADVIL/MOTRIN) 200 MG capsule Take 400 mg by mouth every 4 hours as needed for pain For PCOS pain, Historical      ketoconazole (NIZORAL) 2 % external shampoo Apply topically to wet hair, lather, rinse thoroughly, and repeat; use daily as needed to control dandruff.Disp-120 mL, [id number] discontinue other Rx with incorrect sig.      naltrexone (DEPADE/REVIA) 50 MG tablet Take 12.5 - 25 mg po qday, Disp-45 tablet, R-1, E-Prescribe      norethindrone-ethinyl estradiol (JUNEL FE 1/20) 1-20 MG-MCG tablet Take 1 tablet by mouth daily, Disp-84 tablet, R-3, E-Prescribe      phenazopyridine (PYRIDIUM) 100 MG tablet Take 1 tablet (100 mg) by mouth 3 times daily as needed for urinary tract discomfort, Disp-10 tablet, R-0, E-Prescribe      sertraline (ZOLOFT) 100 MG tablet Take 1.5 tablets (150 mg) by mouth daily, Disp-135 tablet, R-0, E-Prescribe       !! - Potential duplicate medications found. Please discuss with provider.              =================================================================      HPI  Patient information was obtained from: Patient    Use of : N/A        Fabian GIANLUCA Springer is a 28 year old female with a pertinent history of PCOS, asthma, PTSD, anxiety, and depression who presents to this ED via private vehicle with significant other for evaluation of abdominal pain.    Per chart review, patient was seen on 7/25 (~2 weeks ago) at [facility]  [facility] for a urinary symptoms. Patient presented with dysuria and urinary frequency. UA revealed the presence of blood and was \"normal not consistent with UTI\". Wet prep negative for yeast BV and trichomonas. Patient recommended to receive a CT scan to rule out kidney stones and discharged in stable condition with a prescription for pyridium.    Patient states that ~2 days ago she developed upper abdominal pain with the RUQ being more painful that the LUQ. She initially thought it to be car sickness and was able to eat. She notes that she woke up early in the morning ~1 day ago with the same pain and had intermittent episodes every hour that lasted for ~20 minutes. Today she went to bed at 0100 (~5 hours ago) and woke up at 0330 (~3 hours ago) with the same pain. Pain is 7/10. Endorses nausea. Denies vomiting.    Of note, patient believes that she may have Celiac's disease and is currently being tested for this. She has not been told to stop eating gluten and continues to do so. She reports 4/10 abdominal pain and diarrhea at baseline.          --------------- MEDICAL HISTORY ---------------  PAST MEDICAL HISTORY:  Reviewed independently by me.  Past Medical History:   Diagnosis Date    Anxiety     Anxiety     Asthma     Depression     Depressive disorder     PTSD (post-traumatic stress disorder)      Patient Active Problem List   Diagnosis    Major depressive disorder, recurrent, severe without psychotic behavior (H)    Posttraumatic stress disorder    Moderate persistent asthma with exacerbation    Vocal cord dysfunction    Generalized anxiety disorder    BMI 40.0-44.9, adult (H)    Dandruff    Allergic rhinitis    Menorrhagia with irregular cycle    Right hip tendonitis    Folliculitis    PCOS (polycystic ovarian syndrome)       PAST SURGICAL HISTORY:  Reviewed independently by me.  Past Surgical History:   Procedure Laterality Date    EYE SURGERY  08/01/1998    tear duct    RELEASE DEQUERVAINS WRIST Right  "       CURRENT MEDICATIONS:    Reviewed independently by me.  No current facility-administered medications for this encounter.    Current Outpatient Medications:     dicyclomine (BENTYL) 20 MG tablet, Take 1 tablet (20 mg) by mouth 4 times daily as needed (abdominal pain), Disp: 20 tablet, Rfl: 0    famotidine (PEPCID) 20 MG tablet, Take 1 tablet (20 mg) by mouth 2 times daily, Disp: 30 tablet, Rfl: 0    ondansetron (ZOFRAN ODT) 4 MG ODT tab, Take 1 tablet (4 mg) by mouth every 8 hours as needed for nausea, Disp: 20 tablet, Rfl: 0    sucralfate (CARAFATE) 1 GM tablet, Take 1 tablet (1 g) by mouth 4 times daily, Disp: 60 tablet, Rfl: 0    albuterol (PROAIR HFA/PROVENTIL HFA/VENTOLIN HFA) 108 (90 Base) MCG/ACT inhaler, Inhale 2 puffs into the lungs every 6 hours as needed for shortness of breath / dyspnea or wheezing, Disp: 8 g, Rfl: 3    albuterol (PROVENTIL) (2.5 MG/3ML) 0.083% neb solution, Take 1 vial (2.5 mg) by nebulization every 6 hours as needed for shortness of breath / dyspnea or wheezing, Disp: 60 mL, Rfl: 3    buPROPion (WELLBUTRIN XL) 150 MG 24 hr tablet, Take 1 tablet (150 mg) by mouth every morning (Patient not taking: Reported on 7/25/2023), Disp: 30 tablet, Rfl: 1    buPROPion (WELLBUTRIN XL) 300 MG 24 hr tablet, TAKE 1 TABLET BY MOUTH IN THE MORNING, Disp: 90 tablet, Rfl: 0    cetirizine (ZYRTEC) 10 MG tablet, Take 10 mg by mouth daily as needed for allergies (Patient not taking: Reported on 7/25/2023), Disp: , Rfl:     clotrimazole (LOTRIMIN) 1 % external cream, Apply topically 2 times daily (Patient not taking: Reported on 7/25/2023), Disp: 30 g, Rfl: 0    fluticasone (FLOVENT HFA) 220 MCG/ACT inhaler, Inhale 2 puffs into the lungs 2 times daily For asthma exacerbation or vocal chord dysfunction., Disp: , Rfl:     hydrOXYzine (ATARAX) 25 MG tablet, Take 2 tablets (50 mg) by mouth every 4 hours as needed for anxiety, Disp: 45 tablet, Rfl: 11    ibuprofen (ADVIL/MOTRIN) 200 MG capsule, Take 400 mg by  mouth every 4 hours as needed for pain For PCOS pain, Disp: , Rfl:     ketoconazole (NIZORAL) 2 % external shampoo, Apply topically to wet hair, lather, rinse thoroughly, and repeat; use daily as needed to control dandruff., Disp: 120 mL, Rfl: 3    naltrexone (DEPADE/REVIA) 50 MG tablet, Take 12.5 - 25 mg po qday, Disp: 45 tablet, Rfl: 1    norethindrone-ethinyl estradiol (JUNEL FE 1/20) 1-20 MG-MCG tablet, Take 1 tablet by mouth daily (Patient not taking: Reported on 7/25/2023), Disp: 84 tablet, Rfl: 3    phenazopyridine (PYRIDIUM) 100 MG tablet, Take 1 tablet (100 mg) by mouth 3 times daily as needed for urinary tract discomfort, Disp: 10 tablet, Rfl: 0    sertraline (ZOLOFT) 100 MG tablet, Take 1.5 tablets (150 mg) by mouth daily, Disp: 135 tablet, Rfl: 0    ALLERGIES:  Reviewed independently by me.  Allergies   Allergen Reactions    Beef-Derived Products     Other Environmental Allergy     Tomato Hives     Patient cannot have Raw Tomatoes.  This causes hives in her throat.  But pt does take processed tomatoes like ketch-up, pasta sauce etc.        FAMILY HISTORY:  Reviewed independently by me.  Family History   Problem Relation Age of Onset    Lung Cancer Maternal Grandmother     Lung Cancer Maternal Grandfather     Alzheimer Disease Paternal Grandfather     No Known Problems Mother         Patient has no contact with her    No Known Problems Father     Other - See Comments Brother         Lymes Disease         SOCIAL HISTORY:   Reviewed independently by me.  Social History     Socioeconomic History    Marital status: Single   Tobacco Use    Smoking status: Never     Passive exposure: Never    Smokeless tobacco: Never   Vaping Use    Vaping Use: Never used   Substance and Sexual Activity    Alcohol use: Yes     Alcohol/week: 2.0 standard drinks of alcohol    Drug use: Never     Comment: Drug use: denies    Sexual activity: Yes     Partners: Female     Birth control/protection: I.U.D.     Comment: Both patners    Social History Narrative    Currently in residential house , might go to grandparents house    born in [facility] , living in [place] , moved to [place] the day she went ot [facility] . Has a .        --------------- PHYSICAL EXAM ---------------  Nursing notes and vitals independently reviewed by me.  VITALS:  Vitals:    08/08/23 0539 08/08/23 0800   BP: (!) 148/90 119/77   Pulse: 80 64   Resp: 18    SpO2: 100% 97%       PHYSICAL EXAM:    General:  alert, interactive, no distress but appears uncomfortable  Eyes:  conjunctivae clear, conjugate gaze  HENT:  atraumatic, nose with no rhinorrhea, oropharynx clear  Neck:  no meningismus  Cardiovascular:  HR 80s during exam, regular rhythm, no murmurs, brisk cap refill  Chest:  no chest wall tenderness  Pulmonary:  no stridor, normal phonation, normal work of breathing, clear lungs bilaterally  Abdomen:  soft, nondistended, mild upper abdominal tenderness worst in RUQ, no rebound or guarding, negative Hobbs's sign  :  no CVA tenderness  Back:  no midline spinal tenderness  Musculoskeletal:  no pretibial edema, no calf tenderness. Gross ROM intact to joints of extremities with no obvious deformities.  Skin:  warm, dry, no rash  Neuro:  awake, alert, answers questions appropriately, follows commands, moves all limbs  Psych:  calm, normal affect      --------------- RESULTS ---------------  LAB:  Reviewed and independently interpreted by me.  Results for orders placed or performed during the hospital encounter of 08/08/23   US Abdomen Limited (RUQ)    Impression    IMPRESSION:  1.  Normal limited abdominal ultrasound.       Basic metabolic panel   Result Value Ref Range    Sodium 138 136 - 145 mmol/L    Potassium 4.3 3.5 - 5.0 mmol/L    Chloride 106 98 - 107 mmol/L    Carbon Dioxide (CO2) 23 22 - 31 mmol/L    Anion Gap 9 5 - 18 mmol/L    Urea Nitrogen 13 8 - 22 mg/dL    Creatinine 0.83 0.60 - 1.10 mg/dL    Calcium 9.1 8.5 - 10.5 mg/dL    Glucose 110 70 - 125  mg/dL    GFR Estimate >90 >60 mL/min/1.73m2   Hepatic function panel   Result Value Ref Range    Bilirubin Total <0.1 0.0 - 1.0 mg/dL    Bilirubin Direct <0.1 <=0.5 mg/dL    Protein Total 7.8 6.0 - 8.0 g/dL    Albumin 3.8 3.5 - 5.0 g/dL    Alkaline Phosphatase 94 45 - 120 U/L    AST 12 0 - 40 U/L    ALT 14 0 - 45 U/L   Result Value Ref Range    Lipase 22 0 - 52 U/L   HCG qualitative Blood   Result Value Ref Range    hCG Serum Qualitative Negative Negative   UA with Microscopic reflex to Culture    Specimen: Urine, NOS   Result Value Ref Range    Color Urine Light Yellow Colorless, Straw, Light Yellow, Yellow    Appearance Urine Clear Clear    Glucose Urine Negative Negative mg/dL    Bilirubin Urine Negative Negative    Ketones Urine Negative Negative mg/dL    Specific Gravity Urine 1.016 1.001 - 1.030    Blood Urine Negative Negative    pH Urine 5.5 5.0 - 7.0    Protein Albumin Urine Negative Negative mg/dL    Urobilinogen Urine <2.0 <2.0 mg/dL    Nitrite Urine Negative Negative    Leukocyte Esterase Urine 75 Charles/uL (A) Negative    Bacteria Urine Few (A) None Seen /HPF    Mucus Urine Present (A) None Seen /LPF    RBC Urine 0 <=2 /HPF    WBC Urine 21 (H) <=5 /HPF    Squamous Epithelials Urine 6 (H) <=1 /HPF   CBC with platelets and differential   Result Value Ref Range    WBC Count 8.6 4.0 - 11.0 10e3/uL    RBC Count 4.92 3.80 - 5.20 10e6/uL    Hemoglobin 11.9 11.7 - 15.7 g/dL    Hematocrit 37.2 35.0 - 47.0 %    MCV 76 (L) 78 - 100 fL    MCH 24.2 (L) 26.5 - 33.0 pg    MCHC 32.0 31.5 - 36.5 g/dL    RDW 16.5 (H) 10.0 - 15.0 %    Platelet Count 532 (H) 150 - 450 10e3/uL    % Neutrophils 49 %    % Lymphocytes 40 %    % Monocytes 7 %    % Eosinophils 3 %    % Basophils 1 %    % Immature Granulocytes 0 %    NRBCs per 100 WBC 0 <1 /100    Absolute Neutrophils 4.2 1.6 - 8.3 10e3/uL    Absolute Lymphocytes 3.4 0.8 - 5.3 10e3/uL    Absolute Monocytes 0.6 0.0 - 1.3 10e3/uL    Absolute Eosinophils 0.2 0.0 - 0.7 10e3/uL     Absolute Basophils 0.1 0.0 - 0.2 10e3/uL    Absolute Immature Granulocytes 0.0 <=0.4 10e3/uL    Absolute NRBCs 0.0 10e3/uL   Extra Blue Top Tube   Result Value Ref Range    Hold Specimen JIC    Extra Red Top Tube   Result Value Ref Range    Hold Specimen JIC    Extra Purple Top Tube   Result Value Ref Range    Hold Specimen JIC    CRP inflammation   Result Value Ref Range    CRP 1.1 (H) 0.0 - <0.8 mg/dL       RADIOLOGY:  Reviewed and independently interpreted by me. Please see official radiology report.  Recent Results (from the past 24 hour(s))   US Abdomen Limited (RUQ)    Narrative    EXAM: US ABDOMEN LIMITED  LOCATION: [facility]  DATE: 8/8/2023    INDICATION: upper abdominal pain, vomiting  COMPARISON: None.  TECHNIQUE: Limited abdominal ultrasound.    FINDINGS:    GALLBLADDER: Normal. No gallstones, wall thickening, or pericholecystic fluid. Negative sonographic Hobbs's sign.    BILE DUCTS: No biliary dilatation. The common duct measures 3 mm.    LIVER: Normal parenchyma with smooth contour. No focal mass.    RIGHT KIDNEY: 11.1 cm in length without hydronephrosis. No abnormal renal masses are seen.    PANCREAS: The visualized portions are normal.    No ascites.      Impression    IMPRESSION:  1.  Normal limited abdominal ultrasound.                   --------------- ADDITIONAL MDM ---------------  History:  - I considered systemic symptoms of the presenting illness.  - Supplemental history from:       -- see above charting, if applicable: patient, family (girlfriend)  - External Record(s) reviewed:       -- see above charting, if applicable       -- Inpatient/outpatient record (clinic visit 7/25/23), prior labs (swabs 7/25/23), prior imaging (CTs 3/30/23)    Workup:  - Chart documentation above includes differential considered and any EKGs or imaging independently interpreted by provider.  - In additional to work up documented, I considered the following work up:       -- see above  charting, if applicable    External Consultation:  - Discussion of management with another provider:       -- see above charting, if applicable    Complicating Factors:  - Care impacted by chronic illness:       -- see above MDM, past medical history, & problem list  - Care affected by social determinants of health:       -- see above social history       -- Access to Affordable Healthcare    Disposition Considerations:  - Discharge       -- I considered admission to the hospital, but ultimately discharged the patient per decision making above       -- I recommended the patient continue their current prescription strength medication(s) as charted above in current medications list       -- I prescribed prescription strength medication(s) as charted above       -- I recommended over-the-counter medication(s) as charted above & in discharge instructions         I, DEO FOX, am serving as a scribe to document services personally performed by Dr. Cristobal Yoon based on my observation and the provider's statements to me. I, Cristobal Yoon MD attest that DEO FOX is acting in a scribe capacity, has observed my performance of the services and has documented them in accordance with my direction.      Cristobal Yoon MD  08/08/23  Emergency Medicine  [facility] EMERGENCY ROOM  [address] [id number] [phone]  Dept: [phone]     Cristobal Yoon MD  08/08/23 0843

## 2023-08-09 LAB — BACTERIA UR CULT: NORMAL

## 2023-10-10 ENCOUNTER — E-VISIT (OUTPATIENT)
Dept: FAMILY MEDICINE | Facility: CLINIC | Age: 28
End: 2023-10-10
Payer: COMMERCIAL

## 2023-10-10 DIAGNOSIS — L30.9 DERMATITIS: Primary | ICD-10-CM

## 2023-10-10 PROCEDURE — 99421 OL DIG E/M SVC 5-10 MIN: CPT | Performed by: FAMILY MEDICINE

## 2023-10-10 RX ORDER — TRIAMCINOLONE ACETONIDE 1 MG/G
CREAM TOPICAL 2 TIMES DAILY
Qty: 30 G | Refills: 0 | Status: SHIPPED | OUTPATIENT
Start: 2023-10-10

## 2023-10-10 NOTE — PATIENT INSTRUCTIONS
I am a little confused about the questionnaire you completed.  At one place up on the top it says you do not have any symptoms.  Later you say that you have had similar symptoms before and then you describe that you have had pain and bumps on your hands that continues up your arms and that it worsens the longer you wear vinyl gloves.      So, if your concern is that you have painful bumps on your arms and hands from wearing vinyl gloves then my recommendations are to stop wearing vinyl gloves, you can use the steroid cream that I have prescribed for up to 2 weeks.  However, if you do not currently have pain and bumps on your hands from wearing vinyl gloves then please schedule an appointment with me so we can discuss what is going on.    If you're not feeling better within 5-7 days, please schedule an appointment.  You can schedule an appointment right here in [facility], or call [phone]  If the visit is for the same symptoms as your eVisit, we'll refund the cost of your eVisit if seen within seven days.

## 2024-01-04 DIAGNOSIS — E66.812 CLASS 2 OBESITY DUE TO EXCESS CALORIES WITHOUT SERIOUS COMORBIDITY WITH BODY MASS INDEX (BMI) OF 39.0 TO 39.9 IN ADULT: ICD-10-CM

## 2024-01-04 DIAGNOSIS — F33.2 MAJOR DEPRESSIVE DISORDER, RECURRENT, SEVERE WITHOUT PSYCHOTIC BEHAVIOR (H): ICD-10-CM

## 2024-01-04 DIAGNOSIS — F43.10 POSTTRAUMATIC STRESS DISORDER: ICD-10-CM

## 2024-01-04 DIAGNOSIS — F41.1 GENERALIZED ANXIETY DISORDER: ICD-10-CM

## 2024-01-04 DIAGNOSIS — E66.09 CLASS 2 OBESITY DUE TO EXCESS CALORIES WITHOUT SERIOUS COMORBIDITY WITH BODY MASS INDEX (BMI) OF 39.0 TO 39.9 IN ADULT: ICD-10-CM

## 2024-01-04 DIAGNOSIS — R45.851 SUICIDAL IDEATION: ICD-10-CM

## 2024-01-08 RX ORDER — BUPROPION HYDROCHLORIDE 300 MG/1
TABLET ORAL
Qty: 30 TABLET | Refills: 0 | Status: SHIPPED | OUTPATIENT
Start: 2024-01-08 | End: 2024-02-26

## 2024-01-08 RX ORDER — SERTRALINE HYDROCHLORIDE 100 MG/1
TABLET, FILM COATED ORAL
Qty: 45 TABLET | Refills: 0 | Status: SHIPPED | OUTPATIENT
Start: 2024-01-08 | End: 2024-04-01

## 2024-02-19 DIAGNOSIS — F33.2 MAJOR DEPRESSIVE DISORDER, RECURRENT, SEVERE WITHOUT PSYCHOTIC BEHAVIOR (H): ICD-10-CM

## 2024-02-19 DIAGNOSIS — R45.851 SUICIDAL IDEATION: ICD-10-CM

## 2024-02-19 DIAGNOSIS — E66.812 CLASS 2 OBESITY DUE TO EXCESS CALORIES WITHOUT SERIOUS COMORBIDITY WITH BODY MASS INDEX (BMI) OF 39.0 TO 39.9 IN ADULT: ICD-10-CM

## 2024-02-19 DIAGNOSIS — F41.1 GENERALIZED ANXIETY DISORDER: ICD-10-CM

## 2024-02-19 DIAGNOSIS — F43.10 POSTTRAUMATIC STRESS DISORDER: ICD-10-CM

## 2024-02-19 DIAGNOSIS — E66.09 CLASS 2 OBESITY DUE TO EXCESS CALORIES WITHOUT SERIOUS COMORBIDITY WITH BODY MASS INDEX (BMI) OF 39.0 TO 39.9 IN ADULT: ICD-10-CM

## 2024-02-23 DIAGNOSIS — F33.2 MAJOR DEPRESSIVE DISORDER, RECURRENT, SEVERE WITHOUT PSYCHOTIC BEHAVIOR (H): ICD-10-CM

## 2024-02-23 DIAGNOSIS — F43.10 POSTTRAUMATIC STRESS DISORDER: ICD-10-CM

## 2024-02-23 DIAGNOSIS — E66.09 CLASS 2 OBESITY DUE TO EXCESS CALORIES WITHOUT SERIOUS COMORBIDITY WITH BODY MASS INDEX (BMI) OF 39.0 TO 39.9 IN ADULT: ICD-10-CM

## 2024-02-23 DIAGNOSIS — R45.851 SUICIDAL IDEATION: ICD-10-CM

## 2024-02-23 DIAGNOSIS — F41.1 GENERALIZED ANXIETY DISORDER: ICD-10-CM

## 2024-02-23 DIAGNOSIS — E66.812 CLASS 2 OBESITY DUE TO EXCESS CALORIES WITHOUT SERIOUS COMORBIDITY WITH BODY MASS INDEX (BMI) OF 39.0 TO 39.9 IN ADULT: ICD-10-CM

## 2024-02-23 RX ORDER — BUPROPION HYDROCHLORIDE 300 MG/1
TABLET ORAL
Qty: 30 TABLET | Refills: 0 | Status: CANCELLED | OUTPATIENT
Start: 2024-02-23

## 2024-02-23 NOTE — TELEPHONE ENCOUNTER
Medication Question or Refill    What medication are you calling about (include dose and sig)?: Bupropion 300 MG 24 hr tablet and Sertraline 100 MG tablet    Preferred Pharmacy:  [facility] [address] [place] [address] [address]  [address] [facility] [address]  Phone: [phone] Fax: [phone]      Controlled Substance Agreement on file:   CSA -- Patient Level:    CSA: None found at the patient level.       Who prescribed the medication?: Dr. Quinones    Do you need a refill? Yes    Patient offered an appointment- Yes has appt on Monday Feb 26,24    Do you have any questions or concerns?  No      Could we send this information to you in [facility] or would you prefer to receive a phone call?:   Patient would prefer a phone call   Okay to leave a detailed message?: Yes at Cell number on file:    Telephone Information:   Mobile [phone]

## 2024-02-26 ENCOUNTER — TELEPHONE (OUTPATIENT)
Dept: FAMILY MEDICINE | Facility: CLINIC | Age: 29
End: 2024-02-26

## 2024-02-26 ENCOUNTER — VIRTUAL VISIT (OUTPATIENT)
Dept: FAMILY MEDICINE | Facility: CLINIC | Age: 29
End: 2024-02-26
Payer: COMMERCIAL

## 2024-02-26 DIAGNOSIS — E66.09 CLASS 2 OBESITY DUE TO EXCESS CALORIES WITHOUT SERIOUS COMORBIDITY WITH BODY MASS INDEX (BMI) OF 39.0 TO 39.9 IN ADULT: ICD-10-CM

## 2024-02-26 DIAGNOSIS — F33.2 MAJOR DEPRESSIVE DISORDER, RECURRENT, SEVERE WITHOUT PSYCHOTIC BEHAVIOR (H): ICD-10-CM

## 2024-02-26 DIAGNOSIS — F41.1 GENERALIZED ANXIETY DISORDER: ICD-10-CM

## 2024-02-26 DIAGNOSIS — F43.10 POSTTRAUMATIC STRESS DISORDER: ICD-10-CM

## 2024-02-26 DIAGNOSIS — R45.851 SUICIDAL IDEATION: ICD-10-CM

## 2024-02-26 DIAGNOSIS — E66.812 CLASS 2 OBESITY DUE TO EXCESS CALORIES WITHOUT SERIOUS COMORBIDITY WITH BODY MASS INDEX (BMI) OF 39.0 TO 39.9 IN ADULT: ICD-10-CM

## 2024-02-26 PROCEDURE — 99441 PR PHYSICIAN TELEPHONE EVALUATION 5-10 MIN: CPT | Mod: 93 | Performed by: FAMILY MEDICINE

## 2024-02-26 RX ORDER — BUPROPION HYDROCHLORIDE 300 MG/1
TABLET ORAL
Qty: 30 TABLET | Refills: 1 | Status: SHIPPED | OUTPATIENT
Start: 2024-02-26 | End: 2024-02-26

## 2024-02-26 RX ORDER — SERTRALINE HYDROCHLORIDE 100 MG/1
TABLET, FILM COATED ORAL
Qty: 45 TABLET | Refills: 0 | OUTPATIENT
Start: 2024-02-26

## 2024-02-26 RX ORDER — SERTRALINE HYDROCHLORIDE 100 MG/1
TABLET, FILM COATED ORAL
Qty: 45 TABLET | Refills: 1 | Status: SHIPPED | OUTPATIENT
Start: 2024-02-26 | End: 2024-04-01

## 2024-02-26 RX ORDER — BUPROPION HYDROCHLORIDE 300 MG/1
TABLET ORAL
Qty: 30 TABLET | Refills: 0 | OUTPATIENT
Start: 2024-02-26

## 2024-02-26 RX ORDER — BUPROPION HYDROCHLORIDE 150 MG/1
150 TABLET ORAL EVERY MORNING
Qty: 30 TABLET | Refills: 1 | Status: SHIPPED | OUTPATIENT
Start: 2024-02-26 | End: 2024-04-01

## 2024-02-26 RX ORDER — BUPROPION HYDROCHLORIDE 300 MG/1
TABLET ORAL
Qty: 30 TABLET | Refills: 1 | Status: SHIPPED | OUTPATIENT
Start: 2024-02-26 | End: 2024-04-01

## 2024-02-26 RX ORDER — BUPROPION HYDROCHLORIDE 150 MG/1
150 TABLET ORAL EVERY MORNING
Qty: 37 TABLET | Refills: 1 | Status: SHIPPED | OUTPATIENT
Start: 2024-02-26 | End: 2024-02-26

## 2024-02-26 NOTE — TELEPHONE ENCOUNTER
General Call    Contacts         Type Contact Phone/Fax    02/26/2024 02:58 PM CST Phone (Incoming) Sara Springer (Self) [phone] (H)          Reason for Call:  Sertraline and Wellbutrin Prescriptions    What are your questions or concerns:  Patient called very tearful stating that prescriptions are being flagged as needing prior auth from insurance. Patient states she needs prescriptions filled as her mental health is not good.    RN spoke with Pharmacy - she confirms that insurance is requiring prior auths for both because they are stating that the max daily dose they will cover is 1 tablet per day.  Pharmacist states that insurance will cover multiple different strengths but not one strength with multiple tabs per day.    Routing to provider to review and advise on prescriptions.

## 2024-02-26 NOTE — PROGRESS NOTES
Sara is a 28 year old who is being evaluated via a billable telephone visit.      What phone number would you like to be contacted at? [phone]  How would you like to obtain your AVS? -Janki-  Phone call duration: 3 minutes   Originating Location (pt. Location): at work in [place], [place]    Distant Location (provider location):  On-site    Assessment & Plan   Problem List Items Addressed This Visit       Major depressive disorder, recurrent, severe without psychotic behavior (H)    Relevant Medications    sertraline (ZOLOFT) 50 MG tablet    buPROPion (WELLBUTRIN XL) 150 MG 24 hr tablet    buPROPion (WELLBUTRIN XL) 300 MG 24 hr tablet    Posttraumatic stress disorder    Relevant Medications    sertraline (ZOLOFT) 50 MG tablet    buPROPion (WELLBUTRIN XL) 150 MG 24 hr tablet    buPROPion (WELLBUTRIN XL) 300 MG 24 hr tablet    Generalized anxiety disorder    Relevant Medications    sertraline (ZOLOFT) 50 MG tablet    buPROPion (WELLBUTRIN XL) 150 MG 24 hr tablet    buPROPion (WELLBUTRIN XL) 300 MG 24 hr tablet     Other Visit Diagnoses       Suicidal ideation        Relevant Medications    sertraline (ZOLOFT) 50 MG tablet    buPROPion (WELLBUTRIN XL) 150 MG 24 hr tablet    buPROPion (WELLBUTRIN XL) 300 MG 24 hr tablet    Class 2 obesity due to excess calories without serious comorbidity with body mass index (BMI) of 39.0 to 39.9 in adult        Relevant Medications    buPROPion (WELLBUTRIN XL) 300 MG 24 hr tablet           Patient has generalized anxiety disorder, major depressive disorder, not currently well-controlled because she has been out of medication.  She is from out of medication because she has been lost to follow-up.  She feels like the sertraline 150 mg has been a good dose for her but that she would like to increase the bupropion to 300 mg.  She also has history of suicidal ideation.  At this time she is being safe by staying around others.  Will have her resume her sertraline starting at 50 daily for 3  "days then increasing to 100 mg daily for 3 days then going back to her 150 mg daily.    Morbid obesity with PCOS.  Increasing bupropion today.  Will see if this helps get her weight to a healthier place.      Would like her to return to clinic in approximately 1 month for an office visit as well as follow-up on depression and anxiety.    Addendum received notification that patient could not get a prescription for multiple tablets of the same dose of pills so change directions and strengths on the sertraline and bupropion.     BMI  Estimated body mass index is 38.66 kg/m  as calculated from the following:    Height as of 7/19/23: 1.67 m (5' 5.75\").    Weight as of 7/25/23: 107.8 kg (237 lb 11.2 oz).             Angel Ruiz is a 28 year old, presenting for the following health issues:  No chief complaint on file.    HPI                 Objective           Vitals:  No vitals were obtained today due to virtual visit.    Physical Exam   General: Alert and no distress //Respiratory: No audible wheeze, cough, or shortness of breath // Psychiatric:  Appropriate affect, tone, and pace of words            Phone call duration: 8 minutes  Signed Electronically by: Sonia Quinones MD    "

## 2024-02-26 NOTE — CONFIDENTIAL NOTE
Please let patient know that I have switched her sertraline to 100 mg pill.  She will need to take a half a pill daily for 3 days then increase it to a whole pill daily for 3 days then increase it to 1-1/2 pills daily.    In regards to her Wellbutrin would like her to take the 150 mg tablet 1 pill daily x 1 week.  Then switch to the 300 mg pill daily and take the 300 mg daily for 1 week and then continue with the 300 mg pill 1 pill daily with the 150 mg pill 1 pill daily for total of 450 mg daily.

## 2024-02-26 NOTE — TELEPHONE ENCOUNTER
RN called and updated patient.  Instructed patient that written instructions will be sent via My chart for a reference.      Please let patient know that I have switched her sertraline to 100 mg pill.  She will need to take a half a pill daily for 3 days then increase it to a whole pill daily for 3 days then increase it to 1-1/2 pills daily.     In regards to her Wellbutrin would like her to take the 150 mg tablet 1 pill daily x 1 week.  Then switch to the 300 mg pill daily and take the 300 mg daily for 1 week and then continue with the 300 mg pill 1 pill daily with the 150 mg pill 1 pill daily for total of 450 mg daily.

## 2024-03-25 ENCOUNTER — TRANSFERRED RECORDS (OUTPATIENT)
Dept: HEALTH INFORMATION MANAGEMENT | Facility: CLINIC | Age: 29
End: 2024-03-25
Payer: COMMERCIAL

## 2024-04-01 ENCOUNTER — OFFICE VISIT (OUTPATIENT)
Dept: FAMILY MEDICINE | Facility: CLINIC | Age: 29
End: 2024-04-01
Payer: COMMERCIAL

## 2024-04-01 VITALS
DIASTOLIC BLOOD PRESSURE: 78 MMHG | HEART RATE: 81 BPM | BODY MASS INDEX: 42.17 KG/M2 | HEIGHT: 64 IN | WEIGHT: 247 LBS | OXYGEN SATURATION: 99 % | RESPIRATION RATE: 16 BRPM | SYSTOLIC BLOOD PRESSURE: 120 MMHG | TEMPERATURE: 98 F

## 2024-04-01 DIAGNOSIS — Z11.3 SCREEN FOR STD (SEXUALLY TRANSMITTED DISEASE): ICD-10-CM

## 2024-04-01 DIAGNOSIS — J45.41 MODERATE PERSISTENT ASTHMA WITH EXACERBATION: ICD-10-CM

## 2024-04-01 DIAGNOSIS — E66.01 CLASS 3 SEVERE OBESITY DUE TO EXCESS CALORIES WITHOUT SERIOUS COMORBIDITY WITH BODY MASS INDEX (BMI) OF 40.0 TO 44.9 IN ADULT (H): ICD-10-CM

## 2024-04-01 DIAGNOSIS — F33.2 MAJOR DEPRESSIVE DISORDER, RECURRENT, SEVERE WITHOUT PSYCHOTIC BEHAVIOR (H): ICD-10-CM

## 2024-04-01 DIAGNOSIS — E28.2 PCOS (POLYCYSTIC OVARIAN SYNDROME): ICD-10-CM

## 2024-04-01 DIAGNOSIS — F43.10 POSTTRAUMATIC STRESS DISORDER: ICD-10-CM

## 2024-04-01 DIAGNOSIS — J45.40 MODERATE PERSISTENT ASTHMA WITHOUT COMPLICATION: ICD-10-CM

## 2024-04-01 DIAGNOSIS — F41.1 GENERALIZED ANXIETY DISORDER: ICD-10-CM

## 2024-04-01 DIAGNOSIS — Z12.4 CERVICAL CANCER SCREENING: Primary | ICD-10-CM

## 2024-04-01 DIAGNOSIS — E66.813 CLASS 3 SEVERE OBESITY DUE TO EXCESS CALORIES WITHOUT SERIOUS COMORBIDITY WITH BODY MASS INDEX (BMI) OF 40.0 TO 44.9 IN ADULT (H): ICD-10-CM

## 2024-04-01 DIAGNOSIS — Z00.00 ROUTINE GENERAL MEDICAL EXAMINATION AT A HEALTH CARE FACILITY: ICD-10-CM

## 2024-04-01 DIAGNOSIS — Z72.51 UNPROTECTED SEXUAL INTERCOURSE: ICD-10-CM

## 2024-04-01 LAB — HIV 1+2 AB+HIV1 P24 AG SERPL QL IA: NONREACTIVE

## 2024-04-01 PROCEDURE — 87591 N.GONORRHOEAE DNA AMP PROB: CPT | Performed by: FAMILY MEDICINE

## 2024-04-01 PROCEDURE — 87491 CHLMYD TRACH DNA AMP PROBE: CPT | Performed by: FAMILY MEDICINE

## 2024-04-01 PROCEDURE — 36415 COLL VENOUS BLD VENIPUNCTURE: CPT | Performed by: FAMILY MEDICINE

## 2024-04-01 PROCEDURE — 99214 OFFICE O/P EST MOD 30 MIN: CPT | Mod: 25 | Performed by: FAMILY MEDICINE

## 2024-04-01 PROCEDURE — 90471 IMMUNIZATION ADMIN: CPT | Performed by: FAMILY MEDICINE

## 2024-04-01 PROCEDURE — G0123 SCREEN CERV/VAG THIN LAYER: HCPCS | Performed by: FAMILY MEDICINE

## 2024-04-01 PROCEDURE — 99395 PREV VISIT EST AGE 18-39: CPT | Mod: 25 | Performed by: FAMILY MEDICINE

## 2024-04-01 PROCEDURE — 90677 PCV20 VACCINE IM: CPT | Performed by: FAMILY MEDICINE

## 2024-04-01 PROCEDURE — 87389 HIV-1 AG W/HIV-1&-2 AB AG IA: CPT | Performed by: FAMILY MEDICINE

## 2024-04-01 RX ORDER — ALBUTEROL SULFATE 0.83 MG/ML
2.5 SOLUTION RESPIRATORY (INHALATION) EVERY 6 HOURS PRN
Qty: 60 ML | Refills: 3 | Status: SHIPPED | OUTPATIENT
Start: 2024-04-01

## 2024-04-01 RX ORDER — SERTRALINE HYDROCHLORIDE 100 MG/1
150 TABLET, FILM COATED ORAL DAILY
Qty: 135 TABLET | Refills: 1 | Status: SHIPPED | OUTPATIENT
Start: 2024-04-01 | End: 2024-08-27

## 2024-04-01 RX ORDER — ALBUTEROL SULFATE 90 UG/1
2 AEROSOL, METERED RESPIRATORY (INHALATION) EVERY 6 HOURS PRN
Qty: 8 G | Refills: 3 | Status: SHIPPED | OUTPATIENT
Start: 2024-04-01

## 2024-04-01 RX ORDER — FLUTICASONE PROPIONATE 220 UG/1
2 AEROSOL, METERED RESPIRATORY (INHALATION) 2 TIMES DAILY
Qty: 12 G | Refills: 5 | Status: SHIPPED | OUTPATIENT
Start: 2024-04-01

## 2024-04-01 RX ORDER — HYDROXYZINE HYDROCHLORIDE 25 MG/1
50 TABLET, FILM COATED ORAL EVERY 4 HOURS PRN
Qty: 20 TABLET | Refills: 5 | Status: SHIPPED | OUTPATIENT
Start: 2024-04-01

## 2024-04-01 RX ORDER — BUPROPION HYDROCHLORIDE 450 MG/1
450 TABLET, FILM COATED, EXTENDED RELEASE ORAL DAILY
Qty: 90 TABLET | Refills: 1 | Status: SHIPPED | OUTPATIENT
Start: 2024-04-01

## 2024-04-01 SDOH — HEALTH STABILITY: PHYSICAL HEALTH: ON AVERAGE, HOW MANY DAYS PER WEEK DO YOU ENGAGE IN MODERATE TO STRENUOUS EXERCISE (LIKE A BRISK WALK)?: 6 DAYS

## 2024-04-01 ASSESSMENT — ANXIETY QUESTIONNAIRES
1. FEELING NERVOUS, ANXIOUS, OR ON EDGE: SEVERAL DAYS
5. BEING SO RESTLESS THAT IT IS HARD TO SIT STILL: NEARLY EVERY DAY
3. WORRYING TOO MUCH ABOUT DIFFERENT THINGS: NOT AT ALL
GAD7 TOTAL SCORE: 6
2. NOT BEING ABLE TO STOP OR CONTROL WORRYING: NOT AT ALL
IF YOU CHECKED OFF ANY PROBLEMS ON THIS QUESTIONNAIRE, HOW DIFFICULT HAVE THESE PROBLEMS MADE IT FOR YOU TO DO YOUR WORK, TAKE CARE OF THINGS AT HOME, OR GET ALONG WITH OTHER PEOPLE: NOT DIFFICULT AT ALL
GAD7 TOTAL SCORE: 6
6. BECOMING EASILY ANNOYED OR IRRITABLE: SEVERAL DAYS
7. FEELING AFRAID AS IF SOMETHING AWFUL MIGHT HAPPEN: NOT AT ALL

## 2024-04-01 ASSESSMENT — SOCIAL DETERMINANTS OF HEALTH (SDOH): HOW OFTEN DO YOU GET TOGETHER WITH FRIENDS OR RELATIVES?: ONCE A WEEK

## 2024-04-01 ASSESSMENT — PATIENT HEALTH QUESTIONNAIRE - PHQ9: 5. POOR APPETITE OR OVEREATING: SEVERAL DAYS

## 2024-04-01 NOTE — PATIENT INSTRUCTIONS
Preventive Care Advice   This is general advice given by our system to help you stay healthy. However, your care team may have specific advice just for you. Please talk to your care team about your preventive care needs.  Nutrition  Eat 5 or more servings of fruits and vegetables each day.  Try wheat bread, brown rice and whole grain pasta (instead of white bread, rice, and pasta).  Get enough calcium and vitamin D. Check the label on foods and aim for 100% of the RDA (recommended daily allowance).  Lifestyle  Exercise at least 150 minutes each week   (30 minutes a day, 5 days a week).  Do muscle strengthening activities 2 days a week. These help control your weight and prevent disease.  No smoking.  Wear sunscreen to prevent skin cancer.  Have a dental exam and cleaning every 6 months.  Yearly exams  See your health care team every year to talk about:  Any changes in your health.  Any medicines your care team has prescribed.  Preventive care, family planning, and ways to prevent chronic diseases.  Shots (vaccines)   HPV shots (up to age 26), if you've never had them before.  Hepatitis B shots (up to age 59), if you've never had them before.  COVID-19 shot: Get this shot when it's due.  Flu shot: Get a flu shot every year.  Tetanus shot: Get a tetanus shot every 10 years.  Pneumococcal, hepatitis A, and RSV shots: Ask your care team if you need these based on your risk.  Shingles shot (for age 50 and up).  General health tests  Diabetes screening:  Starting at age 35, Get screened for diabetes at least every 3 years.  If you are younger than age 35, ask your care team if you should be screened for diabetes.  Cholesterol test: At age 39, start having a cholesterol test every 5 years, or more often if advised.  Bone density scan (DEXA): At age 50, ask your care team if you should have this scan for osteoporosis (brittle bones).  Hepatitis C: Get tested at least once in your life.  STIs (sexually transmitted  infections)  Before age 24: Ask your care team if you should be screened for STIs.  After age 24: Get screened for STIs if you're at risk. You are at risk for STIs (including HIV) if:  You are sexually active with more than one person.  You don't use condoms every time.  You or a partner was diagnosed with a sexually transmitted infection.  If you are at risk for HIV, ask about PrEP medicine to prevent HIV.  Get tested for HIV at least once in your life, whether you are at risk for HIV or not.  Cancer screening tests  Cervical cancer screening: If you have a cervix, begin getting regular cervical cancer screening tests at age 21. Most people who have regular screenings with normal results can stop after age 65. Talk about this with your provider.  Breast cancer scan (mammogram): If you've ever had breasts, begin having regular mammograms starting at age 40. This is a scan to check for breast cancer.  Colon cancer screening: It is important to start screening for colon cancer at age 45.  Have a colonoscopy test every 10 years (or more often if you're at risk) Or, ask your provider about stool tests like a FIT test every year or Cologuard test every 3 years.  To learn more about your testing options, visit: https://www.[employer]/384519.pdf.  For help making a decision, visit: https://bit.ly/qw19784.  Prostate cancer screening test: If you have a prostate and are age 55 to 69, ask your provider if you would benefit from a yearly prostate cancer screening test.  Lung cancer screening: If you are a current or former smoker age 50 to 80, ask your care team if ongoing lung cancer screenings are right for you.  For informational purposes only. Not to replace the advice of your health care provider. Copyright   2023 [employer]. All rights reserved. Clinically reviewed by the [facility]. [employer] 995334 - REV 01/24.    Learning About Stress  What is stress?     Stress is your  body's response to a hard situation. Your body can have a physical, emotional, or mental response. Stress is a fact of life for most people, and it affects everyone differently. What causes stress for you may not be stressful for someone else.  A lot of things can cause stress. You may feel stress when you go on a job interview, take a test, or run a race. This kind of short-term stress is normal and even useful. It can help you if you need to work hard or react quickly. For example, stress can help you finish an important job on time.  Long-term stress is caused by ongoing stressful situations or events. Examples of long-term stress include long-term health problems, ongoing problems at work, or conflicts in your family. Long-term stress can harm your health.  How does stress affect your health?  When you are stressed, your body responds as though you are in danger. It makes hormones that speed up your heart, make you breathe faster, and give you a burst of energy. This is called the fight-or-flight stress response. If the stress is over quickly, your body goes back to normal and no harm is done.  But if stress happens too often or lasts too long, it can have bad effects. Long-term stress can make you more likely to get sick, and it can make symptoms of some diseases worse. If you tense up when you are stressed, you may develop neck, shoulder, or low back pain. Stress is linked to high blood pressure and heart disease.  Stress also harms your emotional health. It can make you chávez, tense, or depressed. Your relationships may suffer, and you may not do well at work or school.  What can you do to manage stress?  You can try these things to help manage stress:   Do something active. Exercise or activity can help reduce stress. Walking is a great way to get started. Even everyday activities such as housecleaning or yard work can help.  Try yoga or dee chi. These techniques combine exercise and meditation. You may need  some training at first to learn them.  Do something you enjoy. For example, listen to music or go to a movie. Practice your hobby or do volunteer work.  Meditate. This can help you relax, because you are not worrying about what happened before or what may happen in the future.  Do guided imagery. Imagine yourself in any setting that helps you feel calm. You can use online videos, books, or a teacher to guide you.  Do breathing exercises. For example:  From a standing position, bend forward from the waist with your knees slightly bent. Let your arms dangle close to the floor.  Breathe in slowly and deeply as you return to a standing position. Roll up slowly and lift your head last.  Hold your breath for just a few seconds in the standing position.  Breathe out slowly and bend forward from the waist.  Let your feelings out. Talk, laugh, cry, and express anger when you need to. Talking with supportive friends or family, a counselor, or a pedro leader about your feelings is a healthy way to relieve stress. Avoid discussing your feelings with people who make you feel worse.  Write. It may help to write about things that are bothering you. This helps you find out how much stress you feel and what is causing it. When you know this, you can find better ways to cope.  What can you do to prevent stress?  You might try some of these things to help prevent stress:  Manage your time. This helps you find time to do the things you want and need to do.  Get enough sleep. Your body recovers from the stresses of the day while you are sleeping.  Get support. Your family, friends, and community can make a difference in how you experience stress.  Limit your news feed. Avoid or limit time on social media or news that may make you feel stressed.  Do something active. Exercise or activity can help reduce stress. Walking is a great way to get started.  Where can you learn more?  Go to https://www.healthwise.net/patiented  Enter N032 in the  "search box to learn more about \"Learning About Stress.\"  Current as of: October 24, 2023               Content Version: 14.0    7040-7883 [email or web address].   Care instructions adapted under license by your healthcare professional. If you have questions about a medical condition or this instruction, always ask your healthcare professional. [email or web address] disclaims any warranty or liability for your use of this information.      Substance Use Disorder: Care Instructions  Overview     You can improve your life and health by stopping your use of alcohol or drugs. When you don't drink or use drugs, you may feel and sleep better. You may get along better with your family, friends, and coworkers. There are medicines and programs that can help with substance use disorder.  How can you care for yourself at home?  Here are some ways to help you stay sober and prevent relapse.  If you have been given medicine to help keep you sober or reduce your cravings, be sure to take it exactly as prescribed.  Talk to your doctor about programs that can help you stop using drugs or drinking alcohol.  Do not keep alcohol or drugs in your home.  Plan ahead. Think about what you'll say if other people ask you to drink or use drugs. Try not to spend time with people who drink or use drugs.  Use the time and money spent on drinking or drugs to do something that's important to you.  Preventing a relapse  Have a plan to deal with relapse. Learn to recognize changes in your thinking that lead you to drink or use drugs. Get help before you start to drink or use drugs again.  Try to stay away from situations, friends, or places that may lead you to drink or use drugs.  If you feel the need to drink alcohol or use drugs again, seek help right away. Call a trusted friend or family member. Some people get support from organizations such as Narcotics Anonymous or [employer] or from treatment facilities.  If you relapse, get help " as soon as you can. Some people make a plan with another person that outlines what they want that person to do for them if they relapse. The plan usually includes how to handle the relapse and who to notify in case of relapse.  Don't give up. Remember that a relapse doesn't mean that you have failed. Use the experience to learn the triggers that lead you to drink or use drugs. Then quit again. Recovery is a lifelong process. Many people have several relapses before they are able to quit for good.  Follow-up care is a key part of your treatment and safety. Be sure to make and go to all appointments, and call your doctor if you are having problems. It's also a good idea to know your test results and keep a list of the medicines you take.  When should you call for help?   Call 911  anytime you think you may need emergency care. For example, call if you or someone else:    Has overdosed or has withdrawal signs. Be sure to tell the emergency workers that you are or someone else is using or trying to quit using drugs. Overdose or withdrawal signs may include:  Losing consciousness.  Seizure.  Seeing or hearing things that aren't there (hallucinations).     Is thinking or talking about suicide or harming others.   Where to get help 24 hours a day, 7 days a week   If you or someone you know talks about suicide, self-harm, a mental health crisis, a substance use crisis, or any other kind of emotional distress, get help right away. You can:    Call the Suicide and Crisis Lifeline at 986.     Call 0-812-278-TALK (1-107.295.9137).     Text HOME to 759888 to access the Crisis Text Line.   Consider saving these numbers in your phone.  Go to [employer] Healthline.org for more information or to chat online.  Call your doctor now or seek immediate medical care if:    You are having withdrawal symptoms. These may include nausea or vomiting, sweating, shakiness, and anxiety.   Watch closely for changes in your health, and be sure to contact  "your doctor if:    You have a relapse.     You need more help or support to stop.   Where can you learn more?  Go to https://www.[employer].net/patiented  Enter H573 in the search box to learn more about \"Substance Use Disorder: Care Instructions.\"  Current as of: November 15, 2023               Content Version: 14.0    3583-1768 [employer].   Care instructions adapted under license by your healthcare professional. If you have questions about a medical condition or this instruction, always ask your healthcare professional. [employer] disclaims any warranty or liability for your use of this information.      Safer Sex: Care Instructions  Overview  Safer sex is a way to reduce your risk of getting a sexually transmitted infection (STI). It can also help prevent pregnancy.  Several products can help you practice safer sex and reduce your chance of STIs. One of the best is a condom. There are internal and external condoms. You can use a special rubber sheet (dental dam) for protection during oral sex. Disposable gloves can keep your hands from touching blood, semen, or other body fluids that can carry infections.  Remember that birth control methods such as diaphragms, IUDs, foams, and birth control pills do not stop you from getting STIs.  Follow-up care is a key part of your treatment and safety. Be sure to make and go to all appointments, and call your doctor if you are having problems. It's also a good idea to know your test results and keep a list of the medicines you take.  How can you care for yourself at home?  Think about getting vaccinated to help prevent hepatitis A, hepatitis B, and human papillomavirus (HPV). They can be spread through sex.  Use a condom every time you have sex. Use an external condom, which goes on the penis. Or use an internal condom, which goes into the vagina or anus.  Make sure you use the right size external condom. A condom that's too small can break " "easily. A condom that's too big can slip off during sex.  Use a new condom each time you have sex. Be careful not to poke a hole in the condom when you open the wrapper.  Don't use an internal condom and an external condom at the same time.  Never use petroleum jelly (such as Vaseline), grease, hand lotion, baby oil, or anything with oil in it. These products can make holes in the condom.  After intercourse, hold the edge of the condom as you remove it. This will help keep semen from spilling out of the condom.  Do not have sex with anyone who has symptoms of an STI, such as sores on the genitals or mouth.  Do not drink a lot of alcohol or use drugs before sex.  Limit your sex partners. Sex with one partner who has sex only with you can reduce your risk of getting an STI.  Don't share sex toys. But if you do share them, use a condom and clean the sex toys between each use.  Talk to any partners before you have sex. Talk about what you feel comfortable with and whether you have any boundaries with sex. And find out if your partner or partners may be at risk for any STI. Keep in mind that a person may be able to spread an STI even if they do not have symptoms. You and any partners may want to get tested for STIs.  Where can you learn more?  Go to https://www.[employer].net/patiented  Enter B608 in the search box to learn more about \"Safer Sex: Care Instructions.\"  Current as of: November 27, 2023               Content Version: 14.0    8134-2943 [employer].   Care instructions adapted under license by your healthcare professional. If you have questions about a medical condition or this instruction, always ask your healthcare professional. [employer] disclaims any warranty or liability for your use of this information.      "

## 2024-04-01 NOTE — PROGRESS NOTES
Preventive Care Visit  [facility]  Sonia Quinones MD, Family Medicine  Apr 1, 2024      Assessment & Plan   Problem List Items Addressed This Visit       Major depressive disorder, recurrent, severe without psychotic behavior (H)    Relevant Medications    sertraline (ZOLOFT) 100 MG tablet    hydrOXYzine HCl (ATARAX) 25 MG tablet    buPROPion HCl ER, XL, 450 MG TB24    Posttraumatic stress disorder    Relevant Medications    sertraline (ZOLOFT) 100 MG tablet    hydrOXYzine HCl (ATARAX) 25 MG tablet    buPROPion HCl ER, XL, 450 MG TB24    Moderate persistent asthma with exacerbation    Relevant Medications    fluticasone (FLOVENT HFA) 220 MCG/ACT inhaler    albuterol (PROVENTIL) (2.5 MG/3ML) 0.083% neb solution    albuterol (PROAIR HFA/PROVENTIL HFA/VENTOLIN HFA) 108 (90 Base) MCG/ACT inhaler    Generalized anxiety disorder    Relevant Medications    sertraline (ZOLOFT) 100 MG tablet    hydrOXYzine HCl (ATARAX) 25 MG tablet    buPROPion HCl ER, XL, 450 MG TB24    PCOS (polycystic ovarian syndrome)    Relevant Orders    Nutrition Referral    Class 3 severe obesity due to excess calories without serious comorbidity with body mass index (BMI) of 40.0 to 44.9 in adult (H)    Relevant Orders    Nutrition Referral     Other Visit Diagnoses       Cervical cancer screening    -  Primary    Routine general medical examination at a health care facility        Unprotected sexual intercourse        Relevant Orders    HIV Antigen Antibody Combo    Chlamydia & Gonorrhea by PCR, [facility] - Clinic Collect    Screen for STD (sexually transmitted disease)        Relevant Orders    HIV Antigen Antibody Combo    Chlamydia & Gonorrhea by PCR, [facility] - Clinic Collect         Nonfasting labs    Major depressive disorder currently in remission with sertraline and bupropion.  She is currently on 450 mg of bupropion however taking it as 2 separate pills, 300 mg extended release at 150 mg extended release.  " We will see if her insurance covers the 450 mg extended release to decrease pill burden.    Generalized anxiety disorder not yet in remission.  At this point in time patient continues to use hydroxyzine as needed in addition to her bupropion and sertraline 150 mg daily.  She does not feel like we need to make any changes in the dosing of her medications.    Obesity with PCOS is a comorbidity nutrition referral placed    Cervical cancer screening Pap smear pending    Unprotected intercourse will get HIV and gonorrhea and Chlamydia screening done today per patient request.  Also encouraged safe sex practices.    Patient should return to clinic as needed.    Moderate persistent asthma currently well-controlled.  She actually only needs to use her Flovent during times when her seasonal allergies are more poorly controlled.  Her prescriptions for albuterol HFA and nebulizer solutions are both renewed today.                 6/19/2023    10:17 AM 7/25/2023     1:49 AM 3/25/2024     3:50 PM   PHQ   PHQ-9 Total Score 7 6 4   Q9: Thoughts of better off dead/self-harm past 2 weeks Not at all Not at all Not at all          1/30/2023    11:15 AM 4/12/2023    12:47 PM 4/1/2024    11:55 AM   GEMA-7 SCORE   Total Score 14 (moderate anxiety) 7 (mild anxiety)    Total Score 14 7 6            FIB-4 Calculation: 0.16 at 8/8/2023  6:04 AM  Calculated from:  SGOT/AST: 12 U/L at 8/8/2023  6:04 AM  SGPT/ALT: 14 U/L at 8/8/2023  6:04 AM  Platelets: 532 10e3/uL at 8/8/2023  6:04 AM      Age: 28 years            BMI  Estimated body mass index is 42.07 kg/m  as calculated from the following:    Height as of this encounter: 1.632 m (5' 4.25\").    Weight as of this encounter: 112 kg (247 lb).   Weight management plan: Discussed healthy diet and exercise guidelines    Counseling  Appropriate preventive services were discussed with this patient, including applicable screening as appropriate for fall prevention, nutrition, physical activity, " Tobacco-use cessation, weight loss and cognition.  Checklist reviewing preventive services available has been given to the patient.  Reviewed patient's diet, addressing concerns and/or questions.           Angel Ruiz is a 28 year old, presenting for the following:  Physical        4/1/2024    11:04 AM   Additional Questions   Roomed by Jessi        [facility] Care Directive  Patient does not have a Health Care Directive or Living Will: Discussed advance care planning with patient; however, patient declined at this time.    HPI    Here for preventative visit.                  4/1/2024   General Health   How would you rate your overall physical health? (!) FAIR   Feel stress (tense, anxious, or unable to sleep) Rather much   (!) STRESS CONCERN      4/1/2024   Nutrition   Three or more servings of calcium each day? Yes   Diet: Other   If other, please elaborate: calorie counting   How many servings of fruit and vegetables per day? (!) 2-3   How many sweetened beverages each day? 0-1         4/1/2024   Exercise   Days per week of moderate/strenous exercise 6 days         4/1/2024   Social Factors   Frequency of gathering with friends or relatives Once a week   Worry food won't last until get money to buy more Patient declined   Food not last or not have enough money for food? Patient declined   Do you have housing?  Yes   Are you worried about losing your housing? No   Lack of transportation? No   Unable to get utilities (heat,electricity)? No         4/1/2024   Dental   Dentist two times every year? Yes           Today's PHQ-9 Score:       3/25/2024     3:50 PM   PHQ-9 SCORE   PHQ-9 Total Score MyChart 4 (Minimal depression)   PHQ-9 Total Score 4           4/1/2024   Substance Use   Alcohol more than 3/day or more than 7/wk No   Do you use any other substances recreationally? (!) ALCOHOL     Social History     Tobacco Use    Smoking status: Never     Passive exposure: Never    Smokeless tobacco: Never   Vaping Use  "   Vaping Use: Never used   Substance Use Topics    Alcohol use: Yes     Alcohol/week: 2.0 standard drinks of alcohol    Drug use: Never     Comment: Drug use: denies             3/25/2024   Breast Cancer Screening   Family history of breast, colon, or ovarian cancer? No / Unknown              4/1/2024   STI Screening   New sexual partner(s) since last STI/HIV test? (!) YES would like to get updated gc/ct and HIV testing     History of abnormal Pap smear: NO - age 21-29 PAP every 3 years recommended        Latest Ref Rng & Units 11/24/2020    10:04 AM   PAP / HPV   PAP Negative for squamous intraepithelial lesion or malignancy. Negative for squamous intraepithelial lesion or malignancy  Electronically signed by Amara Ren CT (ASCP) on 11/27/2020 at  2:15 PM              4/1/2024   Contraception/Family Planning   Questions about contraception or family planning No        Reviewed and updated as needed this visit by Provider                             Objective    Exam  /78 (BP Location: Right arm, Patient Position: Sitting)   Pulse 81   Temp 98  F (36.7  C) (Tympanic)   Resp 16   Ht 1.632 m (5' 4.25\")   Wt 112 kg (247 lb)   LMP 11/08/2023 (Approximate)   SpO2 99%   BMI 42.07 kg/m     Estimated body mass index is 42.07 kg/m  as calculated from the following:    Height as of this encounter: 1.632 m (5' 4.25\").    Weight as of this encounter: 112 kg (247 lb).    Physical Exam      Exam:  General: alert and oriented ×3 no acute distress.    HEENT: pupils are equal round and reactive to light extraocular motion is intact. Normocephalic and atraumatic.     Hearing is grossly normal and there is no otorrhea.     Chest: has bilateral rise with no increased work of breathing. ctab    Cardiovascular: normal perfusion and brisk capillary refill. s1s2    Musculoskeletal: no gross focal abnormalities and normal gait.    Neuro: no gross focal abnormalities and memory seems intact.    Psychiatric: speech " is clear and coherent and fluent. Patient dressed appropriately for the weather. Mood is appropriate and affect is full.      : Normal external female genitalia. Vagina has no lesions there is normal physiological secretions present there is no odor no foreign body noticed.   Vulva has no lesions. Mucosa appears normal. Cervix appears normal. Specimens obtained as noted in orders. Clitoral short appears normal and there are no labial adhesions.       Discussed with patient to return to clinic if symptoms worsen or do not improve          Signed Electronically by: Sonia Quinones MD

## 2024-04-02 LAB
C TRACH DNA SPEC QL PROBE+SIG AMP: NEGATIVE
N GONORRHOEA DNA SPEC QL NAA+PROBE: NEGATIVE

## 2024-04-03 LAB
BKR LAB AP GYN ADEQUACY: NORMAL
BKR LAB AP GYN INTERPRETATION: NORMAL
BKR LAB AP HPV REFLEX: NORMAL
BKR LAB AP LMP: NORMAL
BKR LAB AP PREVIOUS ABNORMAL: NORMAL
PATH REPORT.COMMENTS IMP SPEC: NORMAL
PATH REPORT.COMMENTS IMP SPEC: NORMAL
PATH REPORT.RELEVANT HX SPEC: NORMAL

## 2024-04-08 ENCOUNTER — TRANSFERRED RECORDS (OUTPATIENT)
Dept: HEALTH INFORMATION MANAGEMENT | Facility: CLINIC | Age: 29
End: 2024-04-08
Payer: COMMERCIAL

## 2024-04-09 ENCOUNTER — TELEPHONE (OUTPATIENT)
Dept: FAMILY MEDICINE | Facility: CLINIC | Age: 29
End: 2024-04-09
Payer: COMMERCIAL

## 2024-04-09 NOTE — CONFIDENTIAL NOTE
Please invite patient to schedule telemed appointment tomorrow for us to discuss next steps in care.  Thank you

## 2024-04-10 ENCOUNTER — TELEPHONE (OUTPATIENT)
Dept: FAMILY MEDICINE | Facility: CLINIC | Age: 29
End: 2024-04-10

## 2024-04-10 NOTE — TELEPHONE ENCOUNTER
General Call    Contacts         Type Contact Phone/Fax    04/10/2024 03:12 PM CDT Phone (Incoming) Sara Springer (Self) [phone] (H)          Reason for Call:  Patient unaware of appointment    What are your questions or concerns:  Patient just received notification on a video visit with Dr. Quinones and did not schedule this appointment. Patient called to find out why this was scheduled.    Appointment was scheduled as a follow-up to a triage call regarding a reaction to a medication patient was taking. She was unable to complete appointment as she was not in [place], and Dr. Quinones does not have a license to practice in [place]. Patient will call back once she checks her schedule and reschedule with Dr. Quinones.  Writer canceled today's appointment.

## 2024-05-03 ENCOUNTER — TRANSFERRED RECORDS (OUTPATIENT)
Dept: HEALTH INFORMATION MANAGEMENT | Facility: CLINIC | Age: 29
End: 2024-05-03
Payer: COMMERCIAL

## 2024-05-10 ENCOUNTER — VIRTUAL VISIT (OUTPATIENT)
Dept: MIDWIFE SERVICES | Facility: CLINIC | Age: 29
End: 2024-05-10
Payer: COMMERCIAL

## 2024-05-10 DIAGNOSIS — N91.1 SECONDARY AMENORRHEA: ICD-10-CM

## 2024-05-10 DIAGNOSIS — E28.2 PCOS (POLYCYSTIC OVARIAN SYNDROME): Primary | ICD-10-CM

## 2024-05-10 PROCEDURE — 99203 OFFICE O/P NEW LOW 30 MIN: CPT | Mod: 95 | Performed by: ADVANCED PRACTICE MIDWIFE

## 2024-05-10 RX ORDER — LEVONORGESTREL/ETHIN.ESTRADIOL 0.1-0.02MG
1 TABLET ORAL DAILY
Qty: 84 TABLET | Refills: 3 | Status: SHIPPED | OUTPATIENT
Start: 2024-05-10

## 2024-05-10 NOTE — PROGRESS NOTES
Pregnancy test    History:    Stress, diet, exercise, weight changes, med changes, illness    Hirsutism, acne, history of irregular menses - PCOS    Uterine scarring, infections    Labs: FSH, prolactin, estradiol, TSH, A1C    PCOS - planning kids but not now   8 months since last menstrual   Longest 3-4 months   IUD for 7 years - no periods   Birth control -     PCOS - cyst burst (painful and bleeding), seen on ultrasound, A1C done a few years     Buproprion and zoloft and allergy     College - 2 years   Not exercising - shoulder injury (no weight lifting)  Weight is stable   Healthier diet     17 minutes

## 2024-05-10 NOTE — PROGRESS NOTES
Sara is a 28 year old who is being evaluated via a billable video visit.    How would you like to obtain your AVS? MyChart  If the video visit is dropped, the invitation should be resent by: Text to cell phone: [phone]  Will anyone else be joining your video visit? No      Assessment & Plan   Problem List Items Addressed This Visit          OB/Gyn Diagnoses    PCOS (polycystic ovarian syndrome) - Primary    Relevant Medications    levonorgestrel-ethinyl estradiol (AVIANE) 0.1-20 MG-MCG tablet    Other Relevant Orders    TSH    Hemoglobin A1c     Other Visit Diagnoses       Secondary amenorrhea        Relevant Medications    levonorgestrel-ethinyl estradiol (AVIANE) 0.1-20 MG-MCG tablet    Other Relevant Orders    TSH    Hemoglobin A1c           Subjective   Sara is a 28 year old, presenting for the following health issues: PCOS and secondary amenorrhea     Video Time:  17 minutes     HPI     Fabian Springer is a 28 year old  who presents today to discuss secondary anemorrhea due to PCOS. She was diagnosed with PCOS after having multiple ruptured cysts. She had an ultrasound that showed polycystic ovaries and she also has a long standing history of irregular periods. She presents today because she has never gone this long without a period, it has been about 8 months. Usually it is only about 3-4 months. She does desire kids, probably in about 3 years and is concerned about her ability to have kids if she is not getting her period for such a long time. We discussed that PCOS does come with some infertility but it is a spectrum and there are things that we would recommend to start to help her get pregnant. We discussed that the length of time between periods does not predict fertility. I recommended doing a pre-conception visit when they are ready for kids and likely they would start Metformin to start with. They would also come up with a plan of how long to try with each step before moving to the next  step if they do not achieve pregnancy. We discussed needing to get a period to help with the lining of her uterus and to protect it from hyperplasia. She has used birth control in the past. Pills for a short time and IUD for 7 years. She did not have any issues with birth control. Stopped because they did not need birth control, she has a female partner. She is open to birth control to regulate the lining of her uterus better. She does not need a pregnancy test. She has had minimal change in her life. Has been in school for the past two years. Some diet changes, trying to eat healthier. Exercise is less due to a shoulder injury so not able to lift weights like usual but still getting movement in. Weight is stable. No medications changes. Not interested in hormone labs at this time as it would not change plans. Okay with TSH and A1C check. She has had her A1C checked upon diagnosis of PCOS but not for a few years. She feels good about this plan of care and has no further questions or concerns for today.        Review of Systems  Constitutional, HEENT, cardiovascular, pulmonary, gi and gu systems are negative, except as otherwise noted.      Objective    Vitals:  No vitals were obtained today due to virtual visit.    Physical Exam   GENERAL: alert and no distress  EYES: Eyes grossly normal to inspection.  No discharge or erythema, or obvious scleral/conjunctival abnormalities.  RESP: No audible wheeze, cough, or visible cyanosis.    SKIN: Visible skin clear. No significant rash, abnormal pigmentation or lesions.  NEURO: Cranial nerves grossly intact.  Mentation and speech appropriate for age.  PSYCH: Appropriate affect, tone, and pace of words    Video-Visit Details    Type of service:  Video Visit   Video End Time: 8:20 am  Originating Location (pt. Location): Home  Distant Location (provider location):  On-site  Platform used for Video Visit: Arsen  Signed Electronically by: ERIN Ashraf CNM

## 2024-05-20 ENCOUNTER — MYC MEDICAL ADVICE (OUTPATIENT)
Dept: FAMILY MEDICINE | Facility: CLINIC | Age: 29
End: 2024-05-20
Payer: COMMERCIAL

## 2024-05-23 ENCOUNTER — TRANSFERRED RECORDS (OUTPATIENT)
Dept: HEALTH INFORMATION MANAGEMENT | Facility: CLINIC | Age: 29
End: 2024-05-23

## 2024-05-23 ENCOUNTER — LAB (OUTPATIENT)
Dept: LAB | Facility: CLINIC | Age: 29
End: 2024-05-23
Payer: COMMERCIAL

## 2024-05-23 DIAGNOSIS — N91.1 SECONDARY AMENORRHEA: ICD-10-CM

## 2024-05-23 DIAGNOSIS — E28.2 PCOS (POLYCYSTIC OVARIAN SYNDROME): ICD-10-CM

## 2024-05-23 LAB — HBA1C MFR BLD: 5.6 % (ref 0–5.6)

## 2024-05-23 PROCEDURE — 36415 COLL VENOUS BLD VENIPUNCTURE: CPT

## 2024-05-23 PROCEDURE — 83036 HEMOGLOBIN GLYCOSYLATED A1C: CPT

## 2024-05-23 PROCEDURE — 84443 ASSAY THYROID STIM HORMONE: CPT

## 2024-05-24 LAB — TSH SERPL DL<=0.005 MIU/L-ACNC: 0.67 UIU/ML (ref 0.3–4.2)

## 2024-06-06 ENCOUNTER — TRANSFERRED RECORDS (OUTPATIENT)
Dept: HEALTH INFORMATION MANAGEMENT | Facility: CLINIC | Age: 29
End: 2024-06-06
Payer: COMMERCIAL

## 2024-06-17 NOTE — PROGRESS NOTES
Assessment/Plan:      Problem List Items Addressed This Visit        Medium    Major depressive disorder, recurrent, severe without psychotic behavior (H)     Patient is following with psychiatry but is unable to be seen due to unpaid bills. She was encouraged to follow up with psychiatry. Refill of sertraline sent to pharmacy.         Relevant Medications    sertraline (ZOLOFT) 100 MG tablet    Moderate persistent asthma with exacerbation     Well controlled. Continue current treatment.         Menorrhagia with irregular cycle     Due to PCOS. This was her first month on OCPs and she is having breakthrough bleeding. Treatment with 5 days of medroxyprogesterone 10 mg daily to arrest current bleed and induce withdrawal bleed. She will then restart OCPs. Follow up if not improving in 3 months.         Relevant Medications    medroxyPROGESTERone (PROVERA) 10 MG tablet    PCOS (polycystic ovarian syndrome) - Primary    Relevant Medications    semaglutide (OZEMPIC) 2 MG/1.5ML SOPN pen        Patient Instructions   1. Stop birth control pills.  2. Take medroxyprogesterone daily for 5 days. After you finish this you will get a withdrawal bleed.  3. 7 days after finishing the medroxyprogesterone, start a new pill pack of your birth control pills.  4. If bleeding not improving in 3 months, please make follow up appointment.  5. Stop metformin. New prescription submitted for semaglutide.  6. Continue current dosing of sertraline. Follow up with psychiatry recommended.         Subjective:   Fabian Springer is a 26 year old person who presents today for follow up on a few issues. We start singulair at her last visit. She is tolerating that well and it is effective for her.    At her last visit we started some metformin for her PCOS but she isn't tolerating that due to nausea. She has tried taking it at different times of the day but isn't tolerating it. She hasn't been able to increase past 500 mg daily. Semaglutide was not  "covered by her insurance.     We also started her on oral contraceptives. She reports she is having a lot of break through bleeding. She got bleeding when she started the 3rd week of the pill pack. She went into the ER on 10/19/2021. Her hemoglobin was normal. She was instructed to take 2 pills daily so that is what she is doing at this time. She is still having some bleeding now.     The patient is also requesting a chicken pox vaccine. Her record shows only one shot and testing was negative for immunity.    The patient is also requesting a refill of her sertraline. She follows with psychiatry for this issue but isn't able to schedule with them due to unpaid bills.    Patient Active Problem List   Diagnosis     Major depressive disorder, recurrent, severe without psychotic behavior (H)     Posttraumatic stress disorder     Moderate persistent asthma with exacerbation     Vocal cord dysfunction     Generalized anxiety disorder     BMI 40.0-44.9, adult (H)     Dandruff     Allergic rhinitis     Menorrhagia with irregular cycle     Right hip tendonitis     Folliculitis     PCOS (polycystic ovarian syndrome)      Past Medical History:   Diagnosis Date     Anxiety      Anxiety      Asthma      Depression      Depressive disorder      PTSD (post-traumatic stress disorder)      Past Surgical History:   Procedure Laterality Date     EYE SURGERY  08/1998    tear duct       Review of System: Relevant items noted in HPI. ROS otherwise negative.     Objective:     Vitals:    10/29/21 0817   BP: 102/70   BP Location: Left arm   Patient Position: Sitting   Cuff Size: Adult Large   Pulse: 76   Resp: 16   Temp: 97.5  F (36.4  C)   TempSrc: Oral   Weight: 106.3 kg (234 lb 5 oz)   Height: 1.626 m (5' 4\")        Physical Exam  Constitutional:       General: She is not in acute distress.     Appearance: She is not ill-appearing.   Cardiovascular:      Rate and Rhythm: Normal rate and regular rhythm.      Heart sounds: No murmur heard. " [FreeTextEntry1] : The patient presents complaining of painful thick toenails on toes 1, 2, 3, 4 and 5 right foot and 1, 2, 3, 4 and 5 left foot.  He said he has had them for a while but when they get long they bother him.  He stated that without this treatment his toenails cause him pain, which limits his walking.  The patient denied any changes in his medical history.     Pulmonary:      Effort: Pulmonary effort is normal.      Breath sounds: Normal breath sounds. No wheezing or rhonchi.   Psychiatric:         Mood and Affect: Mood normal.         Behavior: Behavior normal.         Thought Content: Thought content normal.         Judgment: Judgment normal.        Answers for HPI/ROS submitted by the patient on 10/29/2021  If you checked off any problems, how difficult have these problems made it for you to do your work, take care of things at home, or get along with other people?: Somewhat difficult  PHQ9 TOTAL SCORE: 12  GEMA 7 TOTAL SCORE: 13       9 years or older (need ONE dose)...

## 2024-06-26 ENCOUNTER — HOSPITAL ENCOUNTER (OUTPATIENT)
Dept: NUTRITION | Facility: CLINIC | Age: 29
Discharge: HOME | End: 2024-06-26
Attending: FAMILY MEDICINE | Admitting: FAMILY MEDICINE
Payer: COMMERCIAL

## 2024-06-26 DIAGNOSIS — E66.01 CLASS 3 SEVERE OBESITY DUE TO EXCESS CALORIES WITHOUT SERIOUS COMORBIDITY WITH BODY MASS INDEX (BMI) OF 40.0 TO 44.9 IN ADULT (H): ICD-10-CM

## 2024-06-26 DIAGNOSIS — E66.813 CLASS 3 SEVERE OBESITY DUE TO EXCESS CALORIES WITHOUT SERIOUS COMORBIDITY WITH BODY MASS INDEX (BMI) OF 40.0 TO 44.9 IN ADULT (H): ICD-10-CM

## 2024-06-26 DIAGNOSIS — E28.2 PCOS (POLYCYSTIC OVARIAN SYNDROME): ICD-10-CM

## 2024-06-26 PROCEDURE — 97802 MEDICAL NUTRITION INDIV IN: CPT | Mod: TEL,95 | Performed by: DIETITIAN, REGISTERED

## 2024-06-26 NOTE — PROGRESS NOTES
"[facility]  Medical Nutrition Therapy    Visit Type: Initial Assessment    Fabian Springer, 28 year old referred by Sonia Quinones MD  for MNT related to   E66.01, Z68.41 (ICD-10-CM) - Class 3 severe obesity due to excess calories without serious comorbidity with body mass index (BMI) of 40.0 to 44.9 in adult (H)   E28.2 (ICD-10-CM) - PCOS (polycystic ovarian syndrome)     Virtual Visit Details    Type of service:  Telephone Visit   Phone call duration: 60 minutes   Originating Location (pt. Location): Home    Distant Location (provider location):  On-site           Nutrition Assessment:  Anthropometrics  Height:   Ht Readings from Last 1 Encounters:   04/01/24 1.632 m (5' 4.25\")         BMI:  42.1   Weight Status:  Obesity Grade III BMI >40   Weight:   Wt Readings from Last 1 Encounters:   04/01/24 112 kg (247 lb)       UBW: 240-250 lb      IBW:  55 kg (122 lb) IBW %: 202%        Weight History:   Wt Readings from Last 20 Encounters:   04/01/24 112 kg (247 lb)   07/25/23 107.8 kg (237 lb 11.2 oz)   07/19/23 109.8 kg (242 lb)   06/19/23 111.9 kg (246 lb 11.2 oz)   04/12/23 107.5 kg (237 lb)   01/30/23 107.5 kg (237 lb)   11/29/22 106.6 kg (235 lb)   10/29/21 106.3 kg (234 lb 5 oz)   09/24/21 106.7 kg (235 lb 5 oz)   09/14/21 106.3 kg (234 lb 7 oz)   03/15/21 110.2 kg (243 lb)   02/09/21 110.3 kg (243 lb 1 oz)   11/24/20 102.8 kg (226 lb 9 oz)   05/29/20 103 kg (227 lb)   11/25/19 97.5 kg (215 lb)   09/13/19 99.3 kg (219 lb)   08/22/19 101.2 kg (223 lb)   -Wt has ranged from 230-240s over the past 3 years.  -Wt is up to 250 lb now.     Goal Weight:   150 lb - was her high school weight around age 20 years     Nutrition History    PMH:   Patient Active Problem List   Diagnosis    Major depressive disorder, recurrent, severe without psychotic behavior (H)    Posttraumatic stress disorder    Moderate persistent asthma with exacerbation    Vocal cord dysfunction    Generalized anxiety disorder    " BMI 40.0-44.9, adult (H)    Dandruff    Allergic rhinitis    Menorrhagia with irregular cycle    Right hip tendonitis    Folliculitis    PCOS (polycystic ovarian syndrome)    Class 3 severe obesity due to excess calories without serious comorbidity with body mass index (BMI) of 40.0 to 44.9 in adult (H)      -First dietitian visit  -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   -Has always struggled with her weight. Has a family history of obesity.   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 Has tried many diets and nothing has worked well.     Labs:   Recent Labs   Lab Test 09/24/21  0914 11/24/20  1004   CHOL 176 173   HDL 52 51    106   TRIG 82 82      Meds:   Current Outpatient Medications   Medication Instructions    albuterol (PROAIR HFA/PROVENTIL HFA/VENTOLIN HFA) 108 (90 Base) MCG/ACT inhaler 2 puffs, Inhalation, EVERY 6 HOURS PRN    albuterol (PROVENTIL) 2.5 mg, Nebulization, EVERY 6 HOURS PRN    buPROPion HCl ER (XL) 450 mg, Oral, DAILY    cetirizine (ZYRTEC) 10 mg, DAILY PRN    dicyclomine (BENTYL) 20 mg, Oral, 4 TIMES DAILY PRN    fluticasone (FLOVENT HFA) 220 MCG/ACT inhaler 2 puffs, Inhalation, 2 TIMES DAILY, For asthma exacerbation or vocal chord dysfunction.    hydrOXYzine HCl (ATARAX) 50 mg, Oral, EVERY 4 HOURS PRN    ibuprofen (ADVIL/MOTRIN) 400 mg, Oral, EVERY 4 HOURS PRN, For PCOS pain    ketoconazole (NIZORAL) 2 % external shampoo Apply topically to wet hair, lather, rinse thoroughly, and repeat; use daily as needed to control dandruff.    levonorgestrel-ethinyl estradiol (AVIANE) 0.1-20 MG-MCG tablet 1 tablet, Oral, DAILY    sertraline (ZOLOFT) 150 mg, Oral, DAILY    triamcinolone (KENALOG) 0.1 % external cream Topical, 2 TIMES DAILY, May use for up to 2 weeks.        Supplements: reviewed      Social/Environmental:   -average sleep per night: not discussed  -level of stress: Eats when stressed, and trying to manage this.       Food  Record - works 9 am - 5 pm  Breakfast: 7 am: Coffee with hazelnut creamer or Celcius beverage (trying to limit caffeine). Protein pancake OR eggs on toast or bagel OR bagel with peanut butter OR skips  Snack: No time for it  Lunch: 1:00-2:00 pm: Brings leftovers to work. Eats a lot of pasta with ground pork or chicken.  Snack: popcorn, pre-popped such as Boom Chicka Pop or Skinny Pop or pretzels or cucumbers with ranch or non-fat Greek yogurt with ranch packet or hummus   Dinner: 6-6:30 pm: Pasta with chicken or ground pork. Rice or potatoes, ground turkey or beef or chicken   Snacks: Halo top ice cream   Beverages: Not much soda or drinks diet soda, cranberry juice once every other day, Body [place] lite, 36 oz jug of water (drinks 3 per day or more)  -Take-out 1-2 times per month - [employer] date night and orders 3 piece chicken penne (eats 1/2 the meal). Has been getting ice cream from shops lately.  -Doesn't eat red meat. Eats eggs and chicken.   -Has a sweet tooth   -Adds salt to foods  -Uses butter in cooking and sometimes olive oil   -Eats a lot of broccoli, peas, sometimes corn, potatoes, but low veggie intake overall  -Loves edamame  -Due to food budget, fruits and veggie are low in the diet     Nutritional Details:   -Food allergies: tomatoes, beef   -Food intolerances:  -Food sensitivities:   -GI concerns: Constipation  -Appetite: good  -Pace of eatin min or slower   -Role of cooking: self and wife cooks as well   -Role of food shopping: self       Physical Activity:  -Gets 10,000 steps or more per day - uses an Apple Watch health annemarie  -Doesn't go to the gym due to a neck injury  -Going swimming more often       ASSESSED MALNUTRITION STATUS  % Weight Loss:  None noted  % Intake:  No decreased intake noted  Subcutaneous Fat Loss:  Unable to determine  Loss of Muscle Mass:  Unable to determine   Fluid Retention:  None noted    Malnutrition Diagnosis:  Patient does not meet two of the above criteria  necessary for diagnosing malnutrition        Nutrition Diagnosis:  Obesity Class III related to excessive oral intake as evidenced by usual dietary intake exceeding energy needs by an estimated 150%, BMI 42.1, and usual dietary intake high in simple sugars.         Nutrition Intervention:  Nutrition Prescription Summary: MNT for Obesity      Nutrition Education (Content):  -Educated pt on using MyPlate for meal planning and discussed portion sizes   -Discussed recommended and not recommended foods (choosing WGs, lean meats, low-fat dairy, fresh fruits & veggies, unsat fats, and limiting high-sodium and refined sugar foods)  -Educated pt on metabolism and used the fire analogy; talked about the importance of consuming consistent meals/snacks throughout the day and listening to hunger/fullness cues (handout provided)  -Discussed healthy snack options- protein+complex CHO  -Educated pt on reading food labels  -Discussed ways to make healthy choices when dining out (choosing baked, broiled, or grilled, unbreaded meats w/o sauces/gravies or choosing them on the side to control amount used)  -Encouraged pt to drink more water throughout the day and explained the importance of hydration.   -Encouraged pt to increase daily PA- include cardiovascular activities w/ultimate goal of 60 min per day     Emailed/mailed information discussed including nutrition handouts to patient.       Nutrition Prescription: Macronutrient and Micronutrient details   Dosing weight: Current wt (112 kg) for energy and fluids, IBW (55 kg) for protein   Energy: 7992-6509 kcals/day ([place] Jeor)    Justification:  (obesity, to promote a 2 lb wt loss per week)   Protein: 55-66 g Pro/day (1-1.2 g pro/Kg)    Justification:  (obesity guidelines )   Fluid: 8913-0525 mL/day   (1 mL/Kcal)     Justification:  (obese)   Fiber:     Women (19-50 years): 25 grams per day          Carbohydrate: 45-65% kcal   <25 g added sugar/day       Fat: 20-35% kcal  <7%  kcal from saturated fat   Micronutrient: DRI  <2,300 mg sodium/day            Vitamin and Mineral Supplements: n/a       Patient Engagement:   Assessed learning needs and learning preference: Yes.  Teaching Method(s) used: Explanation    Nutrition Education (Application):  a)  Discussed current eating plans / recommended alternative food choices    b)  Patient verbalizes understanding of diet by asking questions, goal setting      Anticipate >75% compliance   Stage of Change:  preparation      Nutrition Goals:  1) Work on intuitive eating   2) Choose whole grains   3) Use My Plate for meal planning  4) At times of stress, find other ways to relax instead of eating (crocheting, doing nails, meditation, deep breathing, listening to soothing music, lavender scent, soothing tea)   5) Increase intake of fruits and veggies   6) Eat consistent meals throughout the day   7) Track calories using Cronometer or My Fitness Pal and aim for 8302-4541 calories per day   8) Check weight at home once per week and goal is 2 lb wt loss per week   9) Continue to track steps and aim for 10,000 steps per day    Nutrition Follow Up / Monitoring:   Weight, PO intake, PA      Nutrition Recommendations:  Patient to follow-up with RD in 8 weeks.  Patient has RD contact information to call/email if needed.      Start time: 8:45  End time: 9:45    Total Time Duration: 60 min      Deepthi Negron MS, RD, LD, [facility]  Clinical Dietitian  [phone]

## 2024-07-23 ENCOUNTER — OFFICE VISIT (OUTPATIENT)
Dept: INTERNAL MEDICINE | Facility: CLINIC | Age: 29
End: 2024-07-23
Payer: COMMERCIAL

## 2024-07-23 VITALS
BODY MASS INDEX: 42.12 KG/M2 | TEMPERATURE: 100.2 F | WEIGHT: 246.7 LBS | HEART RATE: 99 BPM | HEIGHT: 64 IN | OXYGEN SATURATION: 98 % | DIASTOLIC BLOOD PRESSURE: 68 MMHG | RESPIRATION RATE: 25 BRPM | SYSTOLIC BLOOD PRESSURE: 102 MMHG

## 2024-07-23 DIAGNOSIS — Z23 ENCOUNTER FOR IMMUNIZATION: Primary | ICD-10-CM

## 2024-07-23 DIAGNOSIS — Z11.1 SCREENING FOR TUBERCULOSIS: ICD-10-CM

## 2024-07-23 PROCEDURE — 90715 TDAP VACCINE 7 YRS/> IM: CPT | Performed by: INTERNAL MEDICINE

## 2024-07-23 PROCEDURE — 90471 IMMUNIZATION ADMIN: CPT | Performed by: INTERNAL MEDICINE

## 2024-07-23 PROCEDURE — 36415 COLL VENOUS BLD VENIPUNCTURE: CPT | Performed by: INTERNAL MEDICINE

## 2024-07-23 PROCEDURE — 86481 TB AG RESPONSE T-CELL SUSP: CPT | Performed by: INTERNAL MEDICINE

## 2024-07-23 PROCEDURE — 99213 OFFICE O/P EST LOW 20 MIN: CPT | Mod: 25 | Performed by: INTERNAL MEDICINE

## 2024-07-23 RX ORDER — GABAPENTIN 300 MG/1
CAPSULE ORAL
COMMUNITY
Start: 2024-06-06

## 2024-07-23 RX ORDER — METHYLPREDNISOLONE 4 MG
TABLET, DOSE PACK ORAL
COMMUNITY
Start: 2024-05-03

## 2024-07-23 SDOH — HEALTH STABILITY: PHYSICAL HEALTH: ON AVERAGE, HOW MANY MINUTES DO YOU ENGAGE IN EXERCISE AT THIS LEVEL?: 120 MIN

## 2024-07-23 SDOH — HEALTH STABILITY: PHYSICAL HEALTH: ON AVERAGE, HOW MANY DAYS PER WEEK DO YOU ENGAGE IN MODERATE TO STRENUOUS EXERCISE (LIKE A BRISK WALK)?: 5 DAYS

## 2024-07-23 ASSESSMENT — SOCIAL DETERMINANTS OF HEALTH (SDOH): HOW OFTEN DO YOU GET TOGETHER WITH FRIENDS OR RELATIVES?: ONCE A WEEK

## 2024-07-23 ASSESSMENT — PAIN SCALES - GENERAL: PAINLEVEL: MODERATE PAIN (5)

## 2024-07-23 NOTE — PROGRESS NOTES
ASSESSMENT AND PLAN:    1. Encounter for immunization  She is required to have booster, after the age of 21, she had TDAP age19  - TDAP 7+ (ADACEL,BOOSTRIX)    2. Screening for tuberculosis  ordered  - Quantiferon TB Gold Plus; Future    Follow up prn and next scheduled visit.     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    HISTORY OF PRESENT ILLNESS:  Fabian Springer is a 28 year old female here for review of immunization needs. She has been well,and has no symptoms of illness.      REVIEW OF SYSTEMS:   See HPI, all other systems on review are negative.    Past Medical History:   Diagnosis Date    Anxiety     Anxiety     Asthma     Depression     Depressive disorder     PTSD (post-traumatic stress disorder)      History   Smoking Status    Never   Smokeless Tobacco    Never     Family History   Problem Relation Age of Onset    Lung Cancer Maternal Grandmother     Other Cancer Maternal Grandmother         Lung    Lung Cancer Maternal Grandfather     Other Cancer Maternal Grandfather         Lung    Alzheimer Disease Paternal Grandfather     No Known Problems Mother         Patient has no contact with her    No Known Problems Father     Other - See Comments Brother         Lymes Disease     Past Surgical History:   Procedure Laterality Date    EYE SURGERY  08/01/1998    tear duct    RELEASE DEQUERVAINS WRIST Right      Allergies   Allergen Reactions    Vinyl Ether Hives    Beef-Derived Products     Other Environmental Allergy     Tomato Hives     Patient cannot have Raw Tomatoes.  This causes hives in her throat.  But pt does take processed tomatoes like ketch-up, pasta sauce etc.     Latex Hives, Itching and Rash    Wasp Venom Protein Swelling     VITALS:  Vitals:    07/23/24 1552   BP: 102/68   BP Location: Left arm   Patient Position: Sitting   Cuff Size: Adult Regular   Pulse: 99   Resp: 25   Temp: 100.2  F (37.9  C)   TempSrc: Tympanic   SpO2: 98%   Weight: 111.9 kg (246 lb 11.2  "oz)   Height: 1.632 m (5' 4.25\")     Estimated body mass index is 42.01 kg/m  as calculated from the following:    Height as of this encounter: 1.632 m (5' 4.25\").    Weight as of this encounter: 111.9 kg (246 lb 11.2 oz).  Wt Readings from Last 3 Encounters:   07/23/24 111.9 kg (246 lb 11.2 oz)   04/01/24 112 kg (247 lb)   07/25/23 107.8 kg (237 lb 11.2 oz)     PHYSICAL EXAM:  Constitutional:  In NAD, alert and oriented    DECISION TO OBTAIN OLD RECORDS AND/OR OBTAIN HISTORY FROM SOMEONE OTHER THAN PATIENT, AND/OR ACCESSING CARE EVERYWHERE):  1       REVIEW AND SUMMARIZATION OF OLD RECORDS, AND/OR OBTAINING HISTORY FROM SOMEONE OTHER THAN PATIENT, AND/OR DISCUSSION OF CASE WITH ANOTHER HEALTH CARE PROVIDER:  2 reviewed her immunization records    REVIEW AND/OR ORDER OF OF CLINICAL LAB TESTS: 1  ordered.    REVIEW AND/OR ORDER OF RADIOLOGY TESTS: 1 0.    REVIEW AND/OR ORDER OF MEDICAL TESTS (EKG/ECHO/COLONOSCOPY/EGD): 1  0    INDEPENDENT  VISUALIZATION OF IMAGE, TRACING, OR SPECIMEN ITSELF (2 EACH):  0     TOTAL: 3    Current Outpatient Medications   Medication Sig Dispense Refill    albuterol (PROAIR HFA/PROVENTIL HFA/VENTOLIN HFA) 108 (90 Base) MCG/ACT inhaler Inhale 2 puffs into the lungs every 6 hours as needed for shortness of breath or wheezing 8 g 3    albuterol (PROVENTIL) (2.5 MG/3ML) 0.083% neb solution Take 1 vial (2.5 mg) by nebulization every 6 hours as needed for shortness of breath or wheezing 60 mL 3    buPROPion HCl ER, XL, 450 MG TB24 Take 450 mg by mouth daily 90 tablet 1    cetirizine (ZYRTEC) 10 MG tablet Take 10 mg by mouth daily as needed for allergies      dicyclomine (BENTYL) 20 MG tablet Take 1 tablet (20 mg) by mouth 4 times daily as needed (abdominal pain) 20 tablet 0    fluticasone (FLOVENT HFA) 220 MCG/ACT inhaler Inhale 2 puffs into the lungs 2 times daily For asthma exacerbation or vocal chord dysfunction. 12 g 5    gabapentin (NEURONTIN) 300 MG capsule TAKE 1 CAPSULE BY MOUTH AT " BEDTIME FOR 3 DAYS THEN TAKE 2 CAPSULES BY MOUTH AT NIGHT THEREAFTER      hydrOXYzine HCl (ATARAX) 25 MG tablet Take 2 tablets (50 mg) by mouth every 4 hours as needed for anxiety 20 tablet 5    ibuprofen (ADVIL/MOTRIN) 200 MG capsule Take 400 mg by mouth every 4 hours as needed for pain For PCOS pain      levonorgestrel-ethinyl estradiol (AVIANE) 0.1-20 MG-MCG tablet Take 1 tablet by mouth daily 84 tablet 3    sertraline (ZOLOFT) 100 MG tablet Take 1.5 tablets (150 mg) by mouth daily for 180 days 135 tablet 1    triamcinolone (KENALOG) 0.1 % external cream Apply topically 2 times daily May use for up to 2 weeks. 30 g 0    ketoconazole (NIZORAL) 2 % external shampoo Apply topically to wet hair, lather, rinse thoroughly, and repeat; use daily as needed to control dandruff. (Patient not taking: Reported on 7/23/2024) 120 mL 3    methylPREDNISolone (MEDROL DOSEPAK) 4 MG tablet therapy pack take by mouth as directed on inside of package (Patient not taking: Reported on 7/23/2024)       Gagandeep Ledesma MD  Internal Medicine  [facility]

## 2024-07-23 NOTE — NURSING NOTE
Prior to immunization administration, verified patients identity using patient s name and date of birth. Please see Immunization Activity for additional information.     Screening Questionnaire for Adult Immunization    Are you sick today?   No   Do you have allergies to medications, food, a vaccine component or latex?   Yes   Have you ever had a serious reaction after receiving a vaccination?   No   Do you have a long-term health problem with heart, lung, kidney, or metabolic disease (e.g., diabetes), asthma, a blood disorder, no spleen, complement component deficiency, a cochlear implant, or a spinal fluid leak?  Are you on long-term aspirin therapy?   No   Do you have cancer, leukemia, HIV/AIDS, or any other immune system problem?   No   Do you have a parent, brother, or sister with an immune system problem?   No   In the past 3 months, have you taken medications that affect  your immune system, such as prednisone, other steroids, or anticancer drugs; drugs for the treatment of rheumatoid arthritis, Crohn s disease, or psoriasis; or have you had radiation treatments?   No   Have you had a seizure, or a brain or other nervous system problem?   No   During the past year, have you received a transfusion of blood or blood    products, or been given immune (gamma) globulin or antiviral drug?   No   For women: Are you pregnant or is there a chance you could become       pregnant during the next month?   No   Have you received any vaccinations in the past 4 weeks?   No     Immunization questionnaire was positive for at least one answer. MD aware.     Pt tolerated injection (TDaP). Site (right deltoid)  was cleansed with alcohol prior to injections. No pain, burning, swelling or redness at the site of the injection. 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   RN  provided pt with copy of immunization report.    Patient instructed to remain in clinic for 15 minutes afterwards, and to report any adverse reactions.     Screening performed by Sherin Goodwin RN on 7/23/2024 at 4:19 PM.    Pt discharged to lab in stable condition.

## 2024-07-25 LAB
GAMMA INTERFERON BACKGROUND BLD IA-ACNC: 0.03 IU/ML
M TB IFN-G BLD-IMP: NEGATIVE
M TB IFN-G CD4+ BCKGRND COR BLD-ACNC: 9.97 IU/ML
MITOGEN IGNF BCKGRD COR BLD-ACNC: 0.01 IU/ML
MITOGEN IGNF BCKGRD COR BLD-ACNC: 0.02 IU/ML
QUANTIFERON MITOGEN: 10 IU/ML
QUANTIFERON NIL TUBE: 0.03 IU/ML
QUANTIFERON TB1 TUBE: 0.04 IU/ML
QUANTIFERON TB2 TUBE: 0.05

## 2024-07-29 ENCOUNTER — TRANSFERRED RECORDS (OUTPATIENT)
Dept: HEALTH INFORMATION MANAGEMENT | Facility: CLINIC | Age: 29
End: 2024-07-29

## 2024-08-24 DIAGNOSIS — F41.1 GENERALIZED ANXIETY DISORDER: ICD-10-CM

## 2024-08-24 DIAGNOSIS — F33.2 MAJOR DEPRESSIVE DISORDER, RECURRENT, SEVERE WITHOUT PSYCHOTIC BEHAVIOR (H): ICD-10-CM

## 2024-08-27 RX ORDER — SERTRALINE HYDROCHLORIDE 100 MG/1
TABLET, FILM COATED ORAL DAILY
Qty: 135 TABLET | Refills: 0 | Status: SHIPPED | OUTPATIENT
Start: 2024-08-27

## 2024-10-15 ENCOUNTER — TRANSFERRED RECORDS (OUTPATIENT)
Dept: HEALTH INFORMATION MANAGEMENT | Facility: CLINIC | Age: 29
End: 2024-10-15
Payer: COMMERCIAL

## 2024-10-29 DIAGNOSIS — F33.2 MAJOR DEPRESSIVE DISORDER, RECURRENT, SEVERE WITHOUT PSYCHOTIC BEHAVIOR (H): ICD-10-CM

## 2024-10-29 DIAGNOSIS — F41.1 GENERALIZED ANXIETY DISORDER: ICD-10-CM

## 2024-10-30 RX ORDER — BUPROPION HYDROCHLORIDE 450 MG/1
TABLET, FILM COATED, EXTENDED RELEASE ORAL
Qty: 90 TABLET | Refills: 1 | Status: SHIPPED | OUTPATIENT
Start: 2024-10-30

## 2024-11-25 ENCOUNTER — OFFICE VISIT (OUTPATIENT)
Dept: FAMILY MEDICINE | Facility: CLINIC | Age: 29
End: 2024-11-25
Payer: COMMERCIAL

## 2024-11-25 VITALS
BODY MASS INDEX: 42 KG/M2 | OXYGEN SATURATION: 97 % | HEIGHT: 64 IN | HEART RATE: 87 BPM | WEIGHT: 246 LBS | RESPIRATION RATE: 16 BRPM | DIASTOLIC BLOOD PRESSURE: 60 MMHG | TEMPERATURE: 98.1 F | SYSTOLIC BLOOD PRESSURE: 100 MMHG

## 2024-11-25 DIAGNOSIS — F41.1 GENERALIZED ANXIETY DISORDER: ICD-10-CM

## 2024-11-25 DIAGNOSIS — Z59.71 UNINSURED: ICD-10-CM

## 2024-11-25 DIAGNOSIS — F33.2 MAJOR DEPRESSIVE DISORDER, RECURRENT, SEVERE WITHOUT PSYCHOTIC BEHAVIOR (H): ICD-10-CM

## 2024-11-25 DIAGNOSIS — J45.40 MODERATE PERSISTENT ASTHMA WITHOUT COMPLICATION: Primary | ICD-10-CM

## 2024-11-25 DIAGNOSIS — J30.89 SEASONAL ALLERGIC RHINITIS DUE TO OTHER ALLERGIC TRIGGER: ICD-10-CM

## 2024-11-25 PROCEDURE — 99214 OFFICE O/P EST MOD 30 MIN: CPT | Performed by: FAMILY MEDICINE

## 2024-11-25 PROCEDURE — G2211 COMPLEX E/M VISIT ADD ON: HCPCS | Performed by: FAMILY MEDICINE

## 2024-11-25 RX ORDER — MONTELUKAST SODIUM 10 MG/1
10 TABLET ORAL AT BEDTIME
Qty: 90 TABLET | Refills: 3 | Status: SHIPPED | OUTPATIENT
Start: 2024-11-25

## 2024-11-25 RX ORDER — AZELASTINE 1 MG/ML
1 SPRAY, METERED NASAL 2 TIMES DAILY
Qty: 30 ML | Refills: 5 | Status: SHIPPED | OUTPATIENT
Start: 2024-11-25

## 2024-11-25 RX ORDER — METHYLPREDNISOLONE 4 MG/1
TABLET ORAL
Qty: 21 TABLET | Refills: 0 | Status: SHIPPED | OUTPATIENT
Start: 2024-11-25

## 2024-11-25 RX ORDER — BUPROPION HYDROCHLORIDE 150 MG/1
150 TABLET ORAL EVERY MORNING
Qty: 90 TABLET | Refills: 1 | Status: SHIPPED | OUTPATIENT
Start: 2024-11-25

## 2024-11-25 RX ORDER — AZELASTINE 1 MG/ML
1 SPRAY, METERED NASAL 2 TIMES DAILY
Qty: 30 ML | Refills: 5 | Status: SHIPPED | OUTPATIENT
Start: 2024-11-25 | End: 2024-11-25

## 2024-11-25 RX ORDER — BUPROPION HYDROCHLORIDE 300 MG/1
300 TABLET ORAL EVERY MORNING
Qty: 90 TABLET | Refills: 1 | Status: SHIPPED | OUTPATIENT
Start: 2024-11-25

## 2024-11-25 RX ORDER — BUPROPION HYDROCHLORIDE 150 MG/1
150 TABLET ORAL EVERY MORNING
Qty: 90 TABLET | Refills: 1 | Status: SHIPPED | OUTPATIENT
Start: 2024-11-25 | End: 2024-11-25

## 2024-11-25 RX ORDER — FLUTICASONE PROPIONATE 220 UG/1
2 AEROSOL, METERED RESPIRATORY (INHALATION) 2 TIMES DAILY
Qty: 12 G | Refills: 5 | Status: SHIPPED | OUTPATIENT
Start: 2024-11-25 | End: 2024-11-25

## 2024-11-25 RX ORDER — MONTELUKAST SODIUM 10 MG/1
10 TABLET ORAL AT BEDTIME
Qty: 90 TABLET | Refills: 3 | Status: SHIPPED | OUTPATIENT
Start: 2024-11-25 | End: 2024-11-25

## 2024-11-25 RX ORDER — METHYLPREDNISOLONE 4 MG/1
TABLET ORAL
Qty: 21 TABLET | Refills: 0 | Status: SHIPPED | OUTPATIENT
Start: 2024-11-25 | End: 2024-11-25

## 2024-11-25 RX ORDER — BUPROPION HYDROCHLORIDE 300 MG/1
300 TABLET ORAL EVERY MORNING
Qty: 90 TABLET | Refills: 1 | Status: SHIPPED | OUTPATIENT
Start: 2024-11-25 | End: 2024-11-25

## 2024-11-25 RX ORDER — FLUTICASONE PROPIONATE 220 UG/1
2 AEROSOL, METERED RESPIRATORY (INHALATION) 2 TIMES DAILY
Qty: 12 G | Refills: 5 | Status: SHIPPED | OUTPATIENT
Start: 2024-11-25

## 2024-11-25 ASSESSMENT — ASTHMA QUESTIONNAIRES
QUESTION_2 LAST FOUR WEEKS HOW OFTEN HAVE YOU HAD SHORTNESS OF BREATH: NOT AT ALL
QUESTION_3 LAST FOUR WEEKS HOW OFTEN DID YOUR ASTHMA SYMPTOMS (WHEEZING, COUGHING, SHORTNESS OF BREATH, CHEST TIGHTNESS OR PAIN) WAKE YOU UP AT NIGHT OR EARLIER THAN USUAL IN THE MORNING: NOT AT ALL
ACT_TOTALSCORE: 25
ACT_TOTALSCORE: 25
QUESTION_5 LAST FOUR WEEKS HOW WOULD YOU RATE YOUR ASTHMA CONTROL: COMPLETELY CONTROLLED
QUESTION_1 LAST FOUR WEEKS HOW MUCH OF THE TIME DID YOUR ASTHMA KEEP YOU FROM GETTING AS MUCH DONE AT WORK, SCHOOL OR AT HOME: NONE OF THE TIME
QUESTION_4 LAST FOUR WEEKS HOW OFTEN HAVE YOU USED YOUR RESCUE INHALER OR NEBULIZER MEDICATION (SUCH AS ALBUTEROL): NOT AT ALL

## 2024-11-25 ASSESSMENT — ANXIETY QUESTIONNAIRES
1. FEELING NERVOUS, ANXIOUS, OR ON EDGE: SEVERAL DAYS
5. BEING SO RESTLESS THAT IT IS HARD TO SIT STILL: SEVERAL DAYS
IF YOU CHECKED OFF ANY PROBLEMS ON THIS QUESTIONNAIRE, HOW DIFFICULT HAVE THESE PROBLEMS MADE IT FOR YOU TO DO YOUR WORK, TAKE CARE OF THINGS AT HOME, OR GET ALONG WITH OTHER PEOPLE: SOMEWHAT DIFFICULT
7. FEELING AFRAID AS IF SOMETHING AWFUL MIGHT HAPPEN: NOT AT ALL
GAD7 TOTAL SCORE: 4
GAD7 TOTAL SCORE: 4
7. FEELING AFRAID AS IF SOMETHING AWFUL MIGHT HAPPEN: NOT AT ALL
4. TROUBLE RELAXING: SEVERAL DAYS
2. NOT BEING ABLE TO STOP OR CONTROL WORRYING: NOT AT ALL
8. IF YOU CHECKED OFF ANY PROBLEMS, HOW DIFFICULT HAVE THESE MADE IT FOR YOU TO DO YOUR WORK, TAKE CARE OF THINGS AT HOME, OR GET ALONG WITH OTHER PEOPLE?: SOMEWHAT DIFFICULT
GAD7 TOTAL SCORE: 4
6. BECOMING EASILY ANNOYED OR IRRITABLE: NOT AT ALL
3. WORRYING TOO MUCH ABOUT DIFFERENT THINGS: SEVERAL DAYS

## 2024-11-25 ASSESSMENT — PATIENT HEALTH QUESTIONNAIRE - PHQ9
SUM OF ALL RESPONSES TO PHQ QUESTIONS 1-9: 4
SUM OF ALL RESPONSES TO PHQ QUESTIONS 1-9: 4
10. IF YOU CHECKED OFF ANY PROBLEMS, HOW DIFFICULT HAVE THESE PROBLEMS MADE IT FOR YOU TO DO YOUR WORK, TAKE CARE OF THINGS AT HOME, OR GET ALONG WITH OTHER PEOPLE: VERY DIFFICULT

## 2024-11-25 NOTE — PROGRESS NOTES
Assessment & Plan   Problem List Items Addressed This Visit       Major depressive disorder, recurrent, severe without psychotic behavior (H)    Relevant Medications    buPROPion (WELLBUTRIN XL) 150 MG 24 hr tablet    buPROPion (WELLBUTRIN XL) 300 MG 24 hr tablet    Moderate persistent asthma with exacerbation    Relevant Medications    montelukast (SINGULAIR) 10 MG tablet    azelastine (ASTELIN) 0.1 % nasal spray    methylPREDNISolone (MEDROL DOSEPAK) 4 MG tablet therapy pack    fluticasone (FLOVENT HFA) 220 MCG/ACT inhaler    Generalized anxiety disorder    Relevant Medications    buPROPion (WELLBUTRIN XL) 150 MG 24 hr tablet    buPROPion (WELLBUTRIN XL) 300 MG 24 hr tablet    Allergic rhinitis    Relevant Medications    montelukast (SINGULAIR) 10 MG tablet    azelastine (ASTELIN) 0.1 % nasal spray    fluticasone (FLOVENT HFA) 220 MCG/ACT inhaler     Other Visit Diagnoses       Moderate persistent asthma without complication    -  Primary    Relevant Medications    montelukast (SINGULAIR) 10 MG tablet    azelastine (ASTELIN) 0.1 % nasal spray    methylPREDNISolone (MEDROL DOSEPAK) 4 MG tablet therapy pack    fluticasone (FLOVENT HFA) 220 MCG/ACT inhaler    Uninsured        Relevant Orders    Primary Care - Care Coordination Referral           Patient shares with me that she currently is uninsured.  She is trying to get another job but has a work comp case which has made it challenging.  She does have moderate persistent asthma but feels like it is not as well-controlled now as it used to be or that her seasonal allergies are not as well-controlled as she would like them to be.  She does report that she has a couple of her Flovent inhalers on hand.  Will try adding some Singulair.  Will have her continue with the montelukast.  Will also add Astelin nasal spray.  Provided with a prescription for methylprednisone in case she has an acute exacerbation.        7/25/2023     1:49 AM 3/25/2024     3:50 PM 11/25/2024     "12:53 PM   PHQ   PHQ-9 Total Score 6 4 4    Q9: Thoughts of better off dead/self-harm past 2 weeks Not at all  Not at all  Not at all        Patient-reported          4/12/2023    12:47 PM 4/1/2024    11:55 AM 11/25/2024    12:55 PM   GEMA-7 SCORE   Total Score 7 (mild anxiety)  4 (minimal anxiety)   Total Score 7 6 4        Patient-reported     Major depression currently in remission.  Will continue with the Wellbutrin 450 mg daily.    Generalized anxiety disorder will continue with Wellbutrin 450 mg daily.  Also well-controlled.    Uninsured will place care coordinator referral for patient.    Will plan to follow-up in 6 months, sooner if needed.     BMI  Estimated body mass index is 41.9 kg/m  as calculated from the following:    Height as of this encounter: 1.632 m (5' 4.25\").    Weight as of this encounter: 111.6 kg (246 lb).   Weight management plan: Discussed healthy diet and exercise guidelines          Angel Ruiz is a 29 year old, presenting for the following health issues:  Recheck Medication    History of Present Illness       Mental Health Follow-up:  Patient presents to follow-up on Depression & Anxiety.Patient's depression since last visit has been:  Good  The patient is not having other symptoms associated with depression.  Patient's anxiety since last visit has been:  Good  The patient is not having other symptoms associated with anxiety.  Any significant life events: job concerns and financial concerns  Patient is feeling anxious or having panic attacks.  Patient has no concerns about alcohol or drug use.    She eats 2-3 servings of fruits and vegetables daily.She consumes 0 sweetened beverage(s) daily.She exercises with enough effort to increase her heart rate 30 to 60 minutes per day.  She exercises with enough effort to increase her heart rate 5 days per week.   She is taking medications regularly.                     Objective    /60 (BP Location: Right arm, Patient Position: " "Sitting)   Pulse 87   Temp 98.1  F (36.7  C) (Tympanic)   Resp 16   Ht 1.632 m (5' 4.25\")   Wt 111.6 kg (246 lb)   LMP  (LMP Unknown)   SpO2 97%   BMI 41.90 kg/m    Body mass index is 41.9 kg/m .  Physical Exam               Signed Electronically by: Sonia Quinones MD    "

## 2024-11-26 ENCOUNTER — PATIENT OUTREACH (OUTPATIENT)
Dept: CARE COORDINATION | Facility: CLINIC | Age: 29
End: 2024-11-26
Payer: COMMERCIAL

## 2024-11-26 DIAGNOSIS — Z71.89 OTHER SPECIFIED COUNSELING: Primary | Chronic | ICD-10-CM

## 2024-11-26 NOTE — PROGRESS NOTES
Clinic Care Coordination Contact    The CHW was asked to reach out to the patient on 11/27/24 due to being in the middle of mediation phone calls. The CHW will reach out in one business day. [place] does show that the patient does not have insurance at this time.    ANDREW ElmoreW  [phone]  Connected [facility]

## 2024-11-26 NOTE — LETTER
[facility] CARE COORDINATION  [address] [facility] [address]    November 27, 2024    Fabian GIANLUCA Springer  [address]  [place] [address]      Dear Sara,    I am a clinic community health worker who works with Sonia Quinones MD with the [facility]. I wanted to thank you for spending the time to talk with me.  Below is a description of clinic care coordination and how we can further assist you.       The clinic care coordination team is made up of a registered nurse, , financial resource worker and community health worker who understand the health care system. The goal of clinic care coordination is to help you manage your health and improve access to the health care system. Our team works alongside your provider to assist you in determining your health and social needs. We can help you obtain health care and community resources, providing you with necessary information and education. We can work with you through any barriers and develop a care plan that helps coordinate and strengthen the communication between you and your care team.  Our services are voluntary and are offered without charge to you personally.    Please feel free to contact Pauly at [phone] with any questions or concerns regarding care coordination and what we can offer.      We are focused on providing you with the highest-quality healthcare experience possible.    Sincerely,     RODERICK Elmore  Connected [facility] Resource Center  [facility]

## 2024-11-27 ENCOUNTER — PATIENT OUTREACH (OUTPATIENT)
Dept: CARE COORDINATION | Facility: CLINIC | Age: 29
End: 2024-11-27
Payer: COMMERCIAL

## 2024-11-27 NOTE — PROGRESS NOTES
FRW Update  11/27/24 FRW called and this was not a good time to talk and so she will call the FRW back if not FRW will follow up next week

## 2024-11-27 NOTE — PROGRESS NOTES
Clinic Care Coordination Contact  Community Health Worker Initial Outreach    CHW Initial Information Gathering:  Referral Source: PCP  Preferred Hospital: [facility]  [phone]  Preferred Urgent Care: Other ([facility])  No PCP office visit in Past Year: No       Patient accepts CC: No, due to the patient stating that her only concern was regarding insurance. The CHW placed a referral for the FRW to reach out and assist with the insurance application. Patient will be sent Care Coordination introduction letter for future reference.     RODERICK Elmore  [phone]  Connected Care Resource [facility]

## 2024-12-04 ENCOUNTER — PATIENT OUTREACH (OUTPATIENT)
Dept: CARE COORDINATION | Facility: CLINIC | Age: 29
End: 2024-12-04
Payer: COMMERCIAL

## 2024-12-04 NOTE — PROGRESS NOTES
FRW Update  12/4/24 FRW called and she applied for insurance and she is going to pick a plan.  FRW is going to close the program due to no FRW need.

## 2025-02-20 DIAGNOSIS — F41.1 GENERALIZED ANXIETY DISORDER: ICD-10-CM

## 2025-02-20 DIAGNOSIS — F33.2 MAJOR DEPRESSIVE DISORDER, RECURRENT, SEVERE WITHOUT PSYCHOTIC BEHAVIOR (H): ICD-10-CM

## 2025-02-20 RX ORDER — SERTRALINE HYDROCHLORIDE 100 MG/1
150 TABLET, FILM COATED ORAL DAILY
Qty: 45 TABLET | Refills: 0 | Status: SHIPPED | OUTPATIENT
Start: 2025-02-20

## 2025-02-20 NOTE — TELEPHONE ENCOUNTER
Patient will run out of this medication tomorrow.  She is taking this medication as prescribed and has not missed any doses.

## 2025-02-20 NOTE — TELEPHONE ENCOUNTER
Clinic RN: Please investigate patient's chart or contact patient if the information cannot be found because patient should have run out of this medication on 11/2024. Confirm patient is taking this medication as prescribed. Document findings and route refill encounter to provider for approval or denial.

## 2025-02-20 NOTE — TELEPHONE ENCOUNTER
Medication Question or Refill        What medication are you calling about (include dose and sig)?: sertraline (ZOLOFT) 100 MG tablet     Preferred Pharmacy:   [id number] [address]  Phone: [phone] Fax: [phone]      Controlled Substance Agreement on file:   CSA -- Patient Level:    CSA: None found at the patient level.       Who prescribed the medication?: Dr. Quinones    Do you need a refill? Yes    Could we send this information to you in 3D Forms or would you prefer to receive a phone call?:   Patient would prefer a phone call   Okay to leave a detailed message?: Yes at Home number on file [phone] ([place])

## (undated) NOTE — ED AVS SNAPSHOT
[facility] Emergency Department    [place]  [address] [address]    Phone:  [phone]    Fax:  [address]   MRN: [id number]    Department:  [facility] Emergency Department   Date of Visit:  1/20 Take 2 tablets (40 mg total) by mouth daily. CHANGE how you take these medications     Albuterol Sulfate  (90 BASE) MCG/ACT Aers   Quantity:  1 Inhaler   Inhale 2 puffs into the lungs every 4 (four) hours as needed for Wheezing.    What gómez a substitute for ongoing medical care. Often, one Emergency Department visit does not uncover every injury or illness.  If you have been referred to a primary care or a specialist physician for a follow-up visit, please tell this physician (or your personal [facility] ([address]) [phone] [address] [facility]. [address]) [phone]                Additional Information       We are concerned for your overall well being:    - If you are a smoker o

## (undated) NOTE — ED AVS SNAPSHOT
[facility] Emergency Department    [place]  [address] [place] [address]    Phone:  [phone]    Fax:  [address]   MRN: [id number]    Department:  [facility] Emergency Department   Date of Visit:  1/20 IF THERE IS ANY CHANGE OR WORSENING OF YOUR CONDITION, CALL YOUR PRIMARY CARE PHYSICIAN AT ONCE OR RETURN IMMEDIATELY TO THE EMERGENCY DEPARTMENT.     If you have been prescribed any medication(s), please fill your prescription right away and begin taking t

## (undated) NOTE — ED AVS SNAPSHOT
[facility] Emergency Department  [place]  [address] [address]  Phone:  [phone]  Fax:  [address] [facility]   MRN: [id number]    Department:  [facility] Emergency Department   Date of Visit:  7/9/2017 CARE PHYSICIAN AT ONCE OR RETURN IMMEDIATELY TO THE EMERGENCY DEPARTMENT.     If you have been prescribed any medication(s), please fill your prescription right away and begin taking the medication(s) as directed    If the emergency physician has read X-ray

## (undated) NOTE — LETTER
January 20, 2017    Patient: Candy Cheema   Date of Visit: 1/20/2017       To Whom It May Concern:    Candy Cheema was seen and treated in our emergency department on 1/20/2017. She should not return to work until 1/21/17.     If you have any ques

## (undated) NOTE — LETTER
January 20, 2017    Patient: German Zhong   Date of Visit: 1/20/2017       To Whom It May Concern:    German Zhong was seen and treated in our emergency department on 1/20/2017. She should not return to work until 1/21/17.     If you have any ques